# Patient Record
Sex: FEMALE | Race: BLACK OR AFRICAN AMERICAN | NOT HISPANIC OR LATINO | Employment: FULL TIME | URBAN - METROPOLITAN AREA
[De-identification: names, ages, dates, MRNs, and addresses within clinical notes are randomized per-mention and may not be internally consistent; named-entity substitution may affect disease eponyms.]

---

## 2022-07-27 ENCOUNTER — OFFICE VISIT (OUTPATIENT)
Dept: INTERNAL MEDICINE | Age: 42
End: 2022-07-27

## 2022-07-27 VITALS
WEIGHT: 256 LBS | TEMPERATURE: 97.3 F | DIASTOLIC BLOOD PRESSURE: 72 MMHG | SYSTOLIC BLOOD PRESSURE: 138 MMHG | BODY MASS INDEX: 43.71 KG/M2 | HEART RATE: 80 BPM | HEIGHT: 64 IN | OXYGEN SATURATION: 98 %

## 2022-07-27 DIAGNOSIS — M15.9 OSTEOARTHRITIS OF MULTIPLE JOINTS, UNSPECIFIED OSTEOARTHRITIS TYPE: ICD-10-CM

## 2022-07-27 DIAGNOSIS — E55.9 VITAMIN D DEFICIENCY: ICD-10-CM

## 2022-07-27 DIAGNOSIS — I10 BENIGN HYPERTENSION: Primary | ICD-10-CM

## 2022-07-27 DIAGNOSIS — Z01.419 GYNECOLOGIC EXAM NORMAL: ICD-10-CM

## 2022-07-27 DIAGNOSIS — Z11.59 ENCOUNTER FOR HEPATITIS C SCREENING TEST FOR LOW RISK PATIENT: ICD-10-CM

## 2022-07-27 DIAGNOSIS — Z00.00 ROUTINE HEALTH MAINTENANCE: ICD-10-CM

## 2022-07-27 DIAGNOSIS — E66.01 MORBID OBESITY WITH BODY MASS INDEX OF 50.0-59.9 IN ADULT: ICD-10-CM

## 2022-07-27 PROBLEM — G43.719 INTRACTABLE CHRONIC MIGRAINE WITHOUT AURA AND WITHOUT STATUS MIGRAINOSUS: Status: ACTIVE | Noted: 2022-07-27

## 2022-07-27 PROBLEM — M70.51 PES ANSERINUS BURSITIS OF RIGHT KNEE: Status: ACTIVE | Noted: 2021-08-13

## 2022-07-27 PROBLEM — M62.89 HAMSTRING TIGHTNESS OF RIGHT LOWER EXTREMITY: Status: ACTIVE | Noted: 2021-08-13

## 2022-07-27 PROBLEM — M25.561 CHRONIC PAIN OF RIGHT KNEE: Status: ACTIVE | Noted: 2021-08-13

## 2022-07-27 PROBLEM — K21.9 GASTROESOPHAGEAL REFLUX DISEASE: Status: ACTIVE | Noted: 2019-11-07

## 2022-07-27 PROBLEM — M22.41 CHONDROMALACIA OF RIGHT PATELLA: Status: ACTIVE | Noted: 2021-08-13

## 2022-07-27 PROBLEM — R76.8 HSV-2 SEROPOSITIVE: Status: ACTIVE | Noted: 2022-07-27

## 2022-07-27 PROBLEM — G89.29 CHRONIC PAIN OF RIGHT KNEE: Status: ACTIVE | Noted: 2021-08-13

## 2022-07-27 PROBLEM — Z98.84 GASTRIC BANDING STATUS: Status: ACTIVE | Noted: 2019-11-07

## 2022-07-27 PROCEDURE — 99204 OFFICE O/P NEW MOD 45 MIN: CPT | Performed by: NURSE PRACTITIONER

## 2022-07-27 RX ORDER — VALACYCLOVIR HYDROCHLORIDE 500 MG/1
500 TABLET, FILM COATED ORAL 2 TIMES DAILY
Qty: 60 TABLET | Refills: 0 | Status: SHIPPED | OUTPATIENT
Start: 2022-07-27 | End: 2022-11-15 | Stop reason: SDUPTHER

## 2022-07-27 RX ORDER — HYDROCHLOROTHIAZIDE 25 MG/1
25 TABLET ORAL DAILY
Qty: 90 TABLET | Refills: 0 | Status: SHIPPED | OUTPATIENT
Start: 2022-07-27 | End: 2022-12-06 | Stop reason: SDUPTHER

## 2022-07-27 RX ORDER — METOPROLOL TARTRATE 50 MG/1
50 TABLET, FILM COATED ORAL 2 TIMES DAILY
Qty: 180 TABLET | Refills: 0 | Status: SHIPPED | OUTPATIENT
Start: 2022-07-27 | End: 2022-12-06 | Stop reason: SDUPTHER

## 2022-07-27 NOTE — PROGRESS NOTES
I N T E R N A L  M E D I C I N E  CHRIS Lisa    ENCOUNTER DATE:  07/27/2022    Brittany Young / 42 y.o. / female      CHIEF COMPLAINT / REASON FOR OFFICE VISIT     Establish Care with new PCP, former patient of CHRIS Paez, follow up Hypertension, Osteoarthritis      ASSESSMENT & PLAN     Diagnoses and all orders for this visit:    1. Benign hypertension (Primary)  -     hydroCHLOROthiazide (HYDRODIURIL) 25 MG tablet; Take 1 tablet by mouth daily.  Dispense: 90 tablet; Refill: 0  -     metoprolol tartrate (LOPRESSOR) 50 MG tablet; Take 1 tablet by mouth 2 (Two) Times a Day for 90 days.  Dispense: 180 tablet; Refill: 0    2. Osteoarthritis of multiple joints, unspecified osteoarthritis type  -     Ambulatory Referral to Orthopedic Surgery  -States was receiving Cortisone injections through [facility]- request to resume     3. Morbid obesity with body mass index of 50.0-59.9 in adult (HCC)         - Discussion of diet, exercise, weight management    4. Vitamin D deficiency  -     Vitamin D 25 Hydroxy    5. Routine health maintenance  -     CBC & Differential  -     Comprehensive Metabolic Panel  -     Hemoglobin A1c  -     Lipid Panel With / Chol / HDL Ratio  -     T4, Free  -     TSH  -     Urinalysis With Microscopic If Indicated (No Culture) - Urine, Clean Catch  -     Vitamin D 25 Hydroxy    6. Encounter for hepatitis C screening test for low risk patient    7. Gynecologic exam normal  -     Ambulatory Referral to Obstetrics / Gynecology    Other orders  -     valACYclovir (VALTREX) 500 MG tablet; Take 1 tablet by mouth 2 (Two) Times a Day for 30 days.  Dispense: 60 tablet; Refill: 0    SUMMARY/DISCUSSION  • New patient here to establish with PCP- [facility]  • Discussion of COPD- states no current medications at this time. States did see Pulmonologist in the past   • Hx HSV and on chronic Valacyclovir  • Referral to OB/GYN, Orthopedic to establish care with new  "insurance  • Followed by Bariatric MD, Dr Colon  • Followed by Rheumatology for Rheumatoid Arthritis  • I spent 30 min in direct care of this patient on this date of service. This time includes times spent by me in the following activities: Preparing for the visit, obtaining and/or reviewing a separately obtained history, performing a medically appropriate examination and/or evaluation, reviewing medical records, reviewing tests, ordering medications, tests, or procedures, counseling and educating the patient, documenting information in the medical record and reviewing office note/correspondence from other providers.     Return in about 6 months (around 1/27/2023) for Next scheduled follow up.      VITAL SIGNS     Visit Vitals  /72   Pulse 80   Temp 97.3 °F (36.3 °C) (Temporal)   Ht 162.6 cm (64\")   Wt 116 kg (256 lb)   LMP 07/21/2022   SpO2 98%   BMI 43.94 kg/m²           BP Readings from Last 3 Encounters:   07/27/22 138/72   12/28/21 123/87     Wt Readings from Last 3 Encounters:   07/27/22 116 kg (256 lb)     Body mass index is 43.94 kg/m².    Blood pressure readings recorded on patient flowsheet:  No flowsheet data found.          MEDICATIONS AT THE TIME OF OFFICE VISIT     Current Outpatient Medications on File Prior to Visit   Medication Sig Dispense Refill   • ibuprofen (ADVIL,MOTRIN) 800 MG tablet Take 1 tablet by mouth Every 8 (Eight) Hours.     • multivitamin with minerals tablet tablet Daily.     • [DISCONTINUED] hydroCHLOROthiazide (HYDRODIURIL) 25 MG tablet Take 1 tablet by mouth daily. 90 tablet 0   • [DISCONTINUED] metoprolol tartrate (LOPRESSOR) 50 MG tablet      • [DISCONTINUED] valACYclovir (VALTREX) 500 MG tablet      • [DISCONTINUED] hydroCHLOROthiazide (HYDRODIURIL) 25 MG tablet      • [DISCONTINUED] metoprolol tartrate (LOPRESSOR) 50 MG tablet Take 1 tablet by mouth 2 (two) times daily. 180 tablet 0     No current facility-administered medications on file prior to visit.        HISTORY " OF PRESENT ILLNESS     42 year old female being seen today to establish care with new PCP, former patient of CHRIS Paez.   Hypertension: controlled. States takes both of her Metoprolol at the same time. Risk factors for coronary artery disease include Obesity, Tobacco abuse. There are no compliance problems.     Osteoarthritis/Rheumatoid Arthritis: taking Ibuprofen as needed. States due for cortisone injections- referral to Ortho sent. Discussion of weight control and effects on arthritis- verbalized understanding.     Patient Care Team:  Fide Tiwari APRN as PCP - General (Family Medicine)    REVIEW OF SYSTEMS     Review of Systems   Constitutional: Negative.    HENT: Negative.    Eyes: Negative.    Respiratory: Negative.    Cardiovascular: Negative.    Gastrointestinal: Negative.    Musculoskeletal: Positive for arthralgias and myalgias.   Skin: Negative.    Neurological: Negative.    Psychiatric/Behavioral: Negative.           PHYSICAL EXAMINATION     Physical Exam  Constitutional:       Appearance: She is obese.   HENT:      Head: Normocephalic.      Nose: Nose normal.      Mouth/Throat:      Mouth: Mucous membranes are moist.      Pharynx: Oropharynx is clear.   Cardiovascular:      Rate and Rhythm: Normal rate and regular rhythm.      Pulses: Normal pulses.      Heart sounds: Normal heart sounds.   Pulmonary:      Effort: Pulmonary effort is normal.      Breath sounds: Normal breath sounds.   Abdominal:      General: Bowel sounds are normal.      Palpations: Abdomen is soft.   Musculoskeletal:         General: Normal range of motion.      Cervical back: Normal range of motion and neck supple.   Skin:     General: Skin is warm and dry.   Neurological:      Mental Status: She is alert and oriented to person, place, and time.   Psychiatric:         Mood and Affect: Mood normal.             REVIEWED DATA     Labs:     Lab Results   Component Value Date     (L) 08/13/2021    K 3.3 (L) 08/13/2021     CALCIUM 9.3 08/13/2021     (H) 08/13/2021    ALT 70 (H) 08/13/2021    BUN 10 08/13/2021    CREATININE 0.8 08/13/2021    CREATININE 0.8 08/24/2020    CREATININE 0.9 04/10/2020       Lab Results   Component Value Date    HGBA1C 5.2 08/13/2021    HGBA1C 5.5 08/24/2020    HGBA1C 5.6 04/10/2020       Lab Results   Component Value Date    LDL 78 08/24/2020    LDL 92 04/10/2020    LDL 75 08/29/2019    HDL 46 (L) 08/24/2020    HDL 48 (L) 04/10/2020    TRIG 113 08/24/2020    TRIG 57 04/10/2020       Lab Results   Component Value Date    TSH 1.180 08/13/2021    TSH 1.300 08/24/2020    TSH 1.050 04/10/2020       Lab Results   Component Value Date    WBC 9.73 08/13/2021    HGB 14.2 08/13/2021     08/13/2021       No results found for: MALBCRERATIO       Imaging:           Medical Tests:           Summary of old records / correspondence / consultant report:           Request outside records:           CHRIS Lisa

## 2022-07-28 LAB
25(OH)D3+25(OH)D2 SERPL-MCNC: 21.7 NG/ML (ref 30–100)
ALBUMIN SERPL-MCNC: 4.1 G/DL (ref 3.8–4.8)
ALBUMIN/GLOB SERPL: 1.5 {RATIO} (ref 1.2–2.2)
ALP SERPL-CCNC: 53 IU/L (ref 44–121)
ALT SERPL-CCNC: 9 IU/L (ref 0–32)
APPEARANCE UR: ABNORMAL
AST SERPL-CCNC: 16 IU/L (ref 0–40)
BASOPHILS # BLD AUTO: 0 X10E3/UL (ref 0–0.2)
BASOPHILS NFR BLD AUTO: 0 %
BILIRUB SERPL-MCNC: 0.6 MG/DL (ref 0–1.2)
BILIRUB UR QL STRIP: NEGATIVE
BUN SERPL-MCNC: 13 MG/DL (ref 6–24)
BUN/CREAT SERPL: 14 (ref 9–23)
CALCIUM SERPL-MCNC: 9.7 MG/DL (ref 8.7–10.2)
CHLORIDE SERPL-SCNC: 103 MMOL/L (ref 96–106)
CHOLEST SERPL-MCNC: 153 MG/DL (ref 100–199)
CHOLEST/HDLC SERPL: 2.9 RATIO (ref 0–4.4)
CO2 SERPL-SCNC: 23 MMOL/L (ref 20–29)
COLOR UR: YELLOW
CREAT SERPL-MCNC: 0.92 MG/DL (ref 0.57–1)
EGFRCR SERPLBLD CKD-EPI 2021: 81 ML/MIN/1.73
EOSINOPHIL # BLD AUTO: 0.1 X10E3/UL (ref 0–0.4)
EOSINOPHIL NFR BLD AUTO: 1 %
ERYTHROCYTE [DISTWIDTH] IN BLOOD BY AUTOMATED COUNT: 11.7 % (ref 11.7–15.4)
GLOBULIN SER CALC-MCNC: 2.8 G/DL (ref 1.5–4.5)
GLUCOSE SERPL-MCNC: 98 MG/DL (ref 65–99)
GLUCOSE UR QL STRIP: NEGATIVE
HBA1C MFR BLD: 5.1 % (ref 4.8–5.6)
HCT VFR BLD AUTO: 38 % (ref 34–46.6)
HDLC SERPL-MCNC: 53 MG/DL
HGB BLD-MCNC: 13.2 G/DL (ref 11.1–15.9)
HGB UR QL STRIP: NEGATIVE
IMM GRANULOCYTES # BLD AUTO: 0 X10E3/UL (ref 0–0.1)
IMM GRANULOCYTES NFR BLD AUTO: 0 %
KETONES UR QL STRIP: ABNORMAL
LDLC SERPL CALC-MCNC: 87 MG/DL (ref 0–99)
LEUKOCYTE ESTERASE UR QL STRIP: NEGATIVE
LYMPHOCYTES # BLD AUTO: 2.4 X10E3/UL (ref 0.7–3.1)
LYMPHOCYTES NFR BLD AUTO: 31 %
MCH RBC QN AUTO: 35.6 PG (ref 26.6–33)
MCHC RBC AUTO-ENTMCNC: 34.7 G/DL (ref 31.5–35.7)
MCV RBC AUTO: 102 FL (ref 79–97)
MICRO URNS: ABNORMAL
MONOCYTES # BLD AUTO: 0.4 X10E3/UL (ref 0.1–0.9)
MONOCYTES NFR BLD AUTO: 6 %
NEUTROPHILS # BLD AUTO: 4.7 X10E3/UL (ref 1.4–7)
NEUTROPHILS NFR BLD AUTO: 62 %
NITRITE UR QL STRIP: NEGATIVE
PH UR STRIP: 6 [PH] (ref 5–7.5)
PLATELET # BLD AUTO: 309 X10E3/UL (ref 150–450)
POTASSIUM SERPL-SCNC: 3.9 MMOL/L (ref 3.5–5.2)
PROT SERPL-MCNC: 6.9 G/DL (ref 6–8.5)
PROT UR QL STRIP: ABNORMAL
RBC # BLD AUTO: 3.71 X10E6/UL (ref 3.77–5.28)
SODIUM SERPL-SCNC: 140 MMOL/L (ref 134–144)
SP GR UR STRIP: >=1.03 (ref 1–1.03)
T4 FREE SERPL-MCNC: 1.27 NG/DL (ref 0.82–1.77)
TRIGL SERPL-MCNC: 63 MG/DL (ref 0–149)
TSH SERPL DL<=0.005 MIU/L-ACNC: 1.04 UIU/ML (ref 0.45–4.5)
UROBILINOGEN UR STRIP-MCNC: 1 MG/DL (ref 0.2–1)
VLDLC SERPL CALC-MCNC: 13 MG/DL (ref 5–40)
WBC # BLD AUTO: 7.7 X10E3/UL (ref 3.4–10.8)

## 2022-09-01 ENCOUNTER — OFFICE VISIT (OUTPATIENT)
Dept: INTERNAL MEDICINE | Age: 42
End: 2022-09-01

## 2022-09-01 VITALS
OXYGEN SATURATION: 99 % | TEMPERATURE: 98.2 F | BODY MASS INDEX: 43.94 KG/M2 | HEIGHT: 64 IN | HEART RATE: 74 BPM | DIASTOLIC BLOOD PRESSURE: 72 MMHG | SYSTOLIC BLOOD PRESSURE: 118 MMHG

## 2022-09-01 DIAGNOSIS — J01.11 ACUTE RECURRENT FRONTAL SINUSITIS: Primary | ICD-10-CM

## 2022-09-01 PROCEDURE — 99213 OFFICE O/P EST LOW 20 MIN: CPT | Performed by: NURSE PRACTITIONER

## 2022-09-01 RX ORDER — ALBUTEROL SULFATE 90 UG/1
2 AEROSOL, METERED RESPIRATORY (INHALATION) EVERY 4 HOURS PRN
Qty: 18 G | Refills: 0 | Status: SHIPPED | OUTPATIENT
Start: 2022-09-01 | End: 2022-09-17

## 2022-09-01 RX ORDER — AZITHROMYCIN 500 MG/1
500 TABLET, FILM COATED ORAL DAILY
Qty: 10 TABLET | Refills: 0 | Status: SHIPPED | OUTPATIENT
Start: 2022-09-01 | End: 2022-09-11

## 2022-09-01 NOTE — PROGRESS NOTES
"    I N T E R N A L  M E D I C I N E  Fide Tiwari, APRN    ENCOUNTER DATE:  09/01/2022    Brittany Young / 42 y.o. / female      CHIEF COMPLAINT / REASON FOR OFFICE VISIT     Sore Throat (Pt states she tested pos for COVID a week ago,, 3 days later after testing pos, pt test was neg, pt c/o sore throat, nasal drainage, productive cough, wheezing, no fever, headaches, chest soreness x2wks )      ASSESSMENT & PLAN     Diagnoses and all orders for this visit:    1. Acute recurrent frontal sinusitis (Primary)    Other orders  -     azithromycin (Zithromax) 500 MG tablet; Take 1 tablet by mouth Daily for 10 days.  Dispense: 10 tablet; Refill: 0  -     albuterol sulfate  (90 Base) MCG/ACT inhaler; Inhale 2 puffs Every 4 (Four) Hours As Needed for Wheezing for up to 7 days.  Dispense: 18 g; Refill: 0         SUMMARY/DISCUSSION  • Discussion of Acute Sinusitis and Treatment  • I spent 15 min in direct care of this patient on this date of service. This time includes times spent by me in the following activities: Preparing for the visit, obtaining and/or reviewing a separately obtained history, performing a medically appropriate examination and/or evaluation, reviewing medical records, reviewing tests, ordering medications, tests, or procedures, counseling and educating the patient, documenting information in the medical record and reviewing office note/correspondence from other providers.     Next Appointment with me: 1/25/2023      VITAL SIGNS     Visit Vitals  /72   Pulse 74   Temp 98.2 °F (36.8 °C)   Ht 162.6 cm (64\")   SpO2 99%   Breastfeeding No   BMI 43.94 kg/m²           BP Readings from Last 3 Encounters:   09/01/22 118/72   07/27/22 138/72   12/28/21 123/87     Wt Readings from Last 3 Encounters:   07/27/22 116 kg (256 lb)     Body mass index is 43.94 kg/m².    Blood pressure readings recorded on patient flowsheet:  No flowsheet data found.          MEDICATIONS AT THE TIME OF OFFICE VISIT     Current " Outpatient Medications on File Prior to Visit   Medication Sig Dispense Refill   • hydroCHLOROthiazide (HYDRODIURIL) 25 MG tablet Take 1 tablet by mouth daily. 90 tablet 0   • ibuprofen (ADVIL,MOTRIN) 800 MG tablet Take 1 tablet by mouth Every 8 (Eight) Hours.     • metoprolol tartrate (LOPRESSOR) 50 MG tablet Take 1 tablet by mouth 2 (Two) Times a Day for 90 days. 180 tablet 0   • multivitamin with minerals tablet tablet Daily.       No current facility-administered medications on file prior to visit.        HISTORY OF PRESENT ILLNESS      42 year old female being seen today for acute cough, congestion, nasal drainage, sore throat. Patient tested positive for COVID-19 over 2 weeks ago, then recently tested negative. Remains with cough, congestion, throat drainage, sinus pressure and pain, and left ear pain. Patient with acute sinusitis- escribed Azithromycin to pharmacy and Albuterol for wheezes. Blood pressure stable with current medication regime.     Patient Care Team:  Fide Tiwari APRN as PCP - General (Family Medicine)    REVIEW OF SYSTEMS     Review of Systems   Constitutional: Negative for appetite change, fatigue and fever.   HENT: Positive for ear pain, rhinorrhea, sinus pressure, sinus pain and sore throat. Negative for congestion and tinnitus.    Eyes: Negative.    Respiratory: Positive for wheezing. Negative for cough and chest tightness.    Cardiovascular: Negative.    Neurological: Positive for headaches. Negative for dizziness.          PHYSICAL EXAMINATION     Physical Exam  HENT:      Ears:      Comments: Left ear with serous drainage and tenderness, erythema     Nose: Nose normal.      Mouth/Throat:      Pharynx: Oropharyngeal exudate and posterior oropharyngeal erythema present.   Cardiovascular:      Rate and Rhythm: Normal rate and regular rhythm.      Pulses: Normal pulses.      Heart sounds: Normal heart sounds.   Pulmonary:      Breath sounds: Wheezing present.   Lymphadenopathy:       Cervical: No cervical adenopathy.   Skin:     General: Skin is warm and dry.   Neurological:      Mental Status: She is alert. Mental status is at baseline.             REVIEWED DATA     Labs:     Lab Results   Component Value Date     07/27/2022    K 3.9 07/27/2022    CALCIUM 9.7 07/27/2022    AST 16 07/27/2022    ALT 9 07/27/2022    BUN 13 07/27/2022    CREATININE 0.92 07/27/2022    CREATININE 0.8 08/13/2021    CREATININE 0.8 08/24/2020       Lab Results   Component Value Date    HGBA1C 5.1 07/27/2022    HGBA1C 5.2 08/13/2021    HGBA1C 5.5 08/24/2020       Lab Results   Component Value Date    LDL 87 07/27/2022    LDL 78 08/24/2020    LDL 92 04/10/2020    HDL 53 07/27/2022    HDL 46 (L) 08/24/2020    TRIG 63 07/27/2022    TRIG 113 08/24/2020       Lab Results   Component Value Date    TSH 1.040 07/27/2022    TSH 1.180 08/13/2021    TSH 1.300 08/24/2020    FREET4 1.27 07/27/2022       Lab Results   Component Value Date    WBC 7.7 07/27/2022    HGB 13.2 07/27/2022     07/27/2022       No results found for: MALBCRERATIO       Imaging:           Medical Tests:           Summary of old records / correspondence / consultant report:           Request outside records:           CHRIS Lisa

## 2022-09-06 ENCOUNTER — TELEPHONE (OUTPATIENT)
Dept: INTERNAL MEDICINE | Age: 42
End: 2022-09-06

## 2022-09-06 NOTE — TELEPHONE ENCOUNTER
Caller: Mansoor Harriskp    Relationship: Self    Best call back number:     Who are you requesting to speak with (clinical staff, provider,  specific staff member): CLINICAL    What was the call regarding: PATIENT STATES SHE WAS SEEN ON 9/1 AND TESTED FOR COVID.  PATIENT IS NOT SEEING THOSE RESULTS ON [facility], AND NEEDS TO HAVE THEM IN ORDER TO RETURN TO WORK TOMORROW.  PLEASE CONTACT THE PATIENT AS SOON AS POSSIBLE TODAY TO PROVIDE COVID RESULTS.      Do you require a callback: YES  
Pt informed no covid test was done In office due to being positive 2 weeks prior.   
No

## 2022-09-14 ENCOUNTER — OFFICE VISIT (OUTPATIENT)
Dept: OBSTETRICS AND GYNECOLOGY | Facility: CLINIC | Age: 42
End: 2022-09-14

## 2022-09-14 ENCOUNTER — OFFICE VISIT (OUTPATIENT)
Dept: ORTHOPEDIC SURGERY | Facility: CLINIC | Age: 42
End: 2022-09-14

## 2022-09-14 ENCOUNTER — PATIENT ROUNDING (BHMG ONLY) (OUTPATIENT)
Dept: ORTHOPEDIC SURGERY | Facility: CLINIC | Age: 42
End: 2022-09-14

## 2022-09-14 VITALS — WEIGHT: 256 LBS | TEMPERATURE: 97.1 F | BODY MASS INDEX: 43.71 KG/M2 | HEIGHT: 64 IN

## 2022-09-14 VITALS
BODY MASS INDEX: 43.71 KG/M2 | WEIGHT: 256 LBS | SYSTOLIC BLOOD PRESSURE: 127 MMHG | HEIGHT: 64 IN | DIASTOLIC BLOOD PRESSURE: 88 MMHG

## 2022-09-14 DIAGNOSIS — Z11.3 SCREENING FOR STD (SEXUALLY TRANSMITTED DISEASE): ICD-10-CM

## 2022-09-14 DIAGNOSIS — R52 PAIN: Primary | ICD-10-CM

## 2022-09-14 DIAGNOSIS — N93.9 ABNORMAL UTERINE BLEEDING (AUB): ICD-10-CM

## 2022-09-14 DIAGNOSIS — Z01.419 WOMEN'S ANNUAL ROUTINE GYNECOLOGICAL EXAMINATION: Primary | ICD-10-CM

## 2022-09-14 DIAGNOSIS — M17.0 ARTHRITIS OF BOTH KNEES: ICD-10-CM

## 2022-09-14 DIAGNOSIS — E66.01 OBESITY, MORBID, BMI 40.0-49.9: ICD-10-CM

## 2022-09-14 DIAGNOSIS — B97.7 HPV IN FEMALE: ICD-10-CM

## 2022-09-14 LAB — TSH SERPL DL<=0.005 MIU/L-ACNC: 1.23 UIU/ML (ref 0.27–4.2)

## 2022-09-14 PROCEDURE — 73562 X-RAY EXAM OF KNEE 3: CPT | Performed by: ORTHOPAEDIC SURGERY

## 2022-09-14 PROCEDURE — 99204 OFFICE O/P NEW MOD 45 MIN: CPT | Performed by: ORTHOPAEDIC SURGERY

## 2022-09-14 PROCEDURE — 99396 PREV VISIT EST AGE 40-64: CPT | Performed by: NURSE PRACTITIONER

## 2022-09-14 NOTE — PROGRESS NOTES
Patient Name: Brittany Sheppard   YOB: 1980  Referring Primary Care Physician: Fide Tiwari APRN  BMI: Body mass index is 43.94 kg/m².    Chief Complaint:    Chief Complaint   Patient presents with   • Right Knee - Follow-up   • Left Knee - Follow-up        HPI:     Brittany Sheppard is a 42 y.o. female who presents today for evaluation of   Chief Complaint   Patient presents with   • Right Knee - Follow-up   • Left Knee - Follow-up   .  Patient is seen today complaint of bilateral knee pain.  Is been going on for quite some time.  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  She was seen by Dr. Soriano in the past and Dr. Ragland and has had injections in the past as well.  Reviewing her chart it says she has had a gastric band and she says she is seeing rheumatologist is told her she has elements of rheumatoid rheumatologic conditions.  She said the knees are major problem today they are bothering him and hurt when she goes up and down stairs she says she has had a lot of weight loss      Subjective   Medications:   Home Medications:  Current Outpatient Medications on File Prior to Visit   Medication Sig   • albuterol sulfate  (90 Base) MCG/ACT inhaler Inhale 2 puffs Every 4 (Four) Hours As Needed for Wheezing for up to 7 days.   • hydroCHLOROthiazide (HYDRODIURIL) 25 MG tablet Take 1 tablet by mouth daily.   • ibuprofen (ADVIL,MOTRIN) 800 MG tablet Take 1 tablet by mouth Every 8 (Eight) Hours.   • metoprolol tartrate (LOPRESSOR) 50 MG tablet Take 1 tablet by mouth 2 (Two) Times a Day for 90 days.   • multivitamin with minerals tablet tablet Daily.   • valACYclovir (VALTREX) 500 MG tablet Take 1 tablet by mouth 2 (Two) Times a Day for 30 days.     No current facility-administered medications on file prior to visit.     Current Medications:  Scheduled Meds:  Continuous Infusions:No current facility-administered  medications for this visit.    PRN Meds:.    I have reviewed the patient's medical history in detail and updated the computerized patient record.  Review and summarization of old records includes:    Past Medical History:   Diagnosis Date   • Anemia    • Anxiety    • Arthritis    • Asthma    • Back pain    • Bipolar 1 disorder (HCC)    • COPD (chronic obstructive pulmonary disease) (HCC)    • Depression    • Dyspnea    • Elevated liver enzymes    • GERD (gastroesophageal reflux disease)    • H. pylori infection     treated 8/2016   • Hepatitis C 03/2015    harvoni completed; attributed to tatoo   • Hirsutism    • History of migraine headaches     with scotomata   • HPV in female 04/23/2021   • HPV in female 03/2014   • HPV in female 10/2015   • Hypertension    • IBS (irritable bowel syndrome)    • Insomnia    • LGSIL on Pap smear of cervix 01/2016    cx biopsy confoirmed LGSIL   • Obesity    • PCOS (polycystic ovarian syndrome)    • PCR positive for herpes simplex virus type 1 (HSV-1) DNA 2018   • Sleep apnea    • Steatosis of liver     liver biopsy; moderate; stage 3, grade 0   • Vitamin D deficiency         Past Surgical History:   Procedure Laterality Date   • APPENDECTOMY  07/19/2010    [facility]   • BREAST SURGERY      I & D of abcess X3   • D & C HYSTEROSCOPY ENDOMETRIAL ABLATION  12/16/2010    menorrhagia; pathology benign   • LAPAROSCOPIC GASTRIC BANDING  03/27/2017    Dr. Prieto Colon [facility]   • LIVER BIOPSY     • PILONIDAL CYST / SINUS EXCISION  09/2008   • POLYPECTOMY     • TOOTH EXTRACTION  2016   • WISDOM TOOTH EXTRACTION  2012        Social History     Occupational History   • Not on file   Tobacco Use   • Smoking status: Current Every Day Smoker   • Smokeless tobacco: Never Used   Substance and Sexual Activity   • Alcohol use: Yes     Comment: occassionally   • Drug use: Not on file   • Sexual activity: Yes     Partners: Male, Female      Social History     Social History Narrative   • Not on file       "  Family History   Problem Relation Age of Onset   • Diabetes Father    • Hypertension Father    • Migraines Father    • Alcohol abuse Father    • Hypertension Mother    • Obesity Mother    • Stroke Paternal Grandmother    • Breast cancer Paternal Grandmother    • Hypertension Paternal Grandmother    • Pancreatic cancer Paternal Grandmother    • Stroke Maternal Grandmother    • Breast cancer Maternal Grandmother 72   • Diabetes Maternal Grandmother    • Hypertension Maternal Grandmother    • Stroke Maternal Grandfather    • Cancer Maternal Grandfather        ROS: 14 point review of systems was performed and all other systems were reviewed and are negative except for documented findings in HPI and today's encounter.     Allergies:   Allergies   Allergen Reactions   • Cephalexin Hives   • Penicillins Anaphylaxis and Hives   • Prunus Persica Anaphylaxis   • Cefaclor Hives   • Codeine Hives   • Morphine Hives and Itching   • Latex Hives and Rash     Hives and anaphlaxic       Constitutional:  Denies fever, shaking or chills   Eyes:  Denies change in visual acuity   HENT:  Denies nasal congestion or sore throat   Respiratory:  Denies cough or shortness of breath   Cardiovascular:  Denies chest pain or severe LE edema   GI:  Denies abdominal pain, nausea, vomiting, bloody stools or diarrhea   Musculoskeletal:  Numbness, tingling, pain, or loss of motor function only as noted above in history of present illness.  : Denies painful urination or hematuria  Integument:  Denies rash, lesion or ulceration   Neurologic:  Denies headache or focal weakness  Endocrine:  Denies lymphadenopathy  Psych:  Denies confusion or change in mental status   Hem:  Denies active bleeding    OBJECTIVE:  Physical Exam: 42 y.o. female  Wt Readings from Last 3 Encounters:   09/14/22 116 kg (256 lb)   09/14/22 116 kg (256 lb)   07/27/22 116 kg (256 lb)     Ht Readings from Last 1 Encounters:   09/14/22 162.6 cm (64\")     Body mass index is 43.94 " kg/m².  Vitals:    09/14/22 1320   Temp: 97.1 °F (36.2 °C)     Vital signs reviewed.     General Appearance:    Alert, cooperative, in no acute distress                  Eyes: conjunctiva clear  ENT: external ears and nose atraumatic  CV: no peripheral edema  Resp: normal respiratory effort  Skin: no rashes or wounds; normal turgor  Psych: mood and affect appropriate  Lymph: no nodes appreciated  Neuro: gross sensation intact  Vascular:  Palpable peripheral pulse in noted extremity  Musculoskeletal Extremities: Exam shows crepitation synovitis swelling and joint line tenderness with some pseudolaxity she has patellofemoral crepitation Bill's is negative    Radiology:   AP lateral 40 degree PA x-ray bilateral knees taken the office today for complaints of pain without comparison views available show advanced arthritic change with narrowing especially patellofemoral joint tricompartmental osteophytes and varus pattern        Assessment:     ICD-10-CM ICD-9-CM   1. Pain  R52 780.96   2. Arthritis of both knees  M17.0 716.96   3. Obesity, morbid, BMI 40.0-49.9 ([facility])  E66.01 278.01        MDM/Plan:   The diagnosis(es), natural history, pathophysiology and treatment for diagnosis(es) were discussed. Opportunity given and questions answered.  Biomechanics of pertinent body areas discussed.  When appropriate, the use of ambulatory aids discussed.    BMI:  The concept of BMI body mass index and its importance and implications discussed.    EXERCISES:  Advice on benefits of, and types of regular/moderate exercise pertaining to orthopedic diagnosis(es).  Inflammation/pain control; with cold, heat, elevation and/or liniments discussed as appropriate  Cortisone Injection. See procedure note.  MEDICAL RECORDS reviewed from other provider(s) for past and current medical history pertinent to this complaint.  Consider possibility of physical therapy as needed.  She says she is not in any entire rheumatologic drugs at the current  time but does follow with a rheumatologist.  Injected her knees and can see her back as necessary    9/14/2022    Dictated utilizing Dragon dictation

## 2022-09-14 NOTE — PROGRESS NOTES
GYN Annual Exam     Chief Complaint   Patient presents with   • Gynecologic Exam     New patient annual exam        HPI    Brittany Sheppard is a 42 y.o. female who presents for annual well woman exam.  She is sexually active with men and women. She is currently in a monogamous relationship with a male partner, her . Periods are irregular, lasting 5 days. Dysmenorrhea:none. Cyclic symptoms include none. No intermenstrual bleeding, spotting, or discharge. Performing SBE:occas. Hx AUB, reports often having 2 menses per month. She has addressed this in the past with other gyn, denies prior work up.     Hx HSV, denies recent outbreaks. Has Rx for valtrex at home    This is my first time meeting Brittany Sheppard  Prior care with Dr Hodges.   OB History        0    Para   0    Term   0       0    AB   0    Living   0       SAB   0    IAB   0    Ectopic   0    Molar   0    Multiple   0    Live Births   0                LMP- 24  Current contraception: none  Last Pap- 2021 NIL, +HPV  History of abnormal Pap smear: Hx LSIL, HPV positive, +high risk HPV. Prior colposcopy   History of STD-HPV, HSV  Family history of uterine, colon or ovarian cancer: no  Family history of breast cancer: yes - MGM and PGM  Mammogram- never    Past Medical History:   Diagnosis Date   • Anemia    • Anxiety    • Arthritis    • Asthma    • Back pain    • Bipolar 1 disorder (HCC)    • COPD (chronic obstructive pulmonary disease) (HCC)    • Depression    • Dyspnea    • Elevated liver enzymes    • GERD (gastroesophageal reflux disease)    • H. pylori infection     treated 2016   • Hepatitis C 2015    harvoni completed; attributed to tatoo   • Hirsutism    • History of migraine headaches     with scotomata   • HPV in female 2021   • HPV in female 2014   • HPV in female 10/2015   • Hypertension    • IBS (irritable bowel syndrome)    • Insomnia    • LGSIL on Pap smear of cervix 2016    cx biopsy confoirmed LGSIL   •  Obesity    • PCOS (polycystic ovarian syndrome)    • PCR positive for herpes simplex virus type 1 (HSV-1) DNA 2018   • Sleep apnea    • Steatosis of liver     liver biopsy; moderate; stage 3, grade 0   • Vitamin D deficiency        Past Surgical History:   Procedure Laterality Date   • APPENDECTOMY  07/19/2010    [facility]   • BREAST SURGERY      I & D of abcess X3   • D & C HYSTEROSCOPY ENDOMETRIAL ABLATION  12/16/2010    menorrhagia; pathology benign   • LAPAROSCOPIC GASTRIC BANDING  03/27/2017    Dr. Prieto [facility]   • LIVER BIOPSY     • PILONIDAL CYST / SINUS EXCISION  09/2008   • POLYPECTOMY     • TOOTH EXTRACTION  2016   • WISDOM TOOTH EXTRACTION  2012         Current Outpatient Medications:   •  albuterol sulfate  (90 Base) MCG/ACT inhaler, Inhale 2 puffs Every 4 (Four) Hours As Needed for Wheezing for up to 7 days., Disp: 18 g, Rfl: 0  •  hydroCHLOROthiazide (HYDRODIURIL) 25 MG tablet, Take 1 tablet by mouth daily., Disp: 90 tablet, Rfl: 0  •  ibuprofen (ADVIL,MOTRIN) 800 MG tablet, Take 1 tablet by mouth Every 8 (Eight) Hours., Disp: , Rfl:   •  metoprolol tartrate (LOPRESSOR) 50 MG tablet, Take 1 tablet by mouth 2 (Two) Times a Day for 90 days., Disp: 180 tablet, Rfl: 0  •  multivitamin with minerals tablet tablet, Daily., Disp: , Rfl:   •  valACYclovir (VALTREX) 500 MG tablet, Take 1 tablet by mouth 2 (Two) Times a Day for 30 days., Disp: 60 tablet, Rfl: 0    Allergies   Allergen Reactions   • Cephalexin Hives   • Penicillins Anaphylaxis and Hives   • Prunus Persica Anaphylaxis   • Cefaclor Hives   • Codeine Hives   • Morphine Hives and Itching   • Latex Hives and Rash     Hives and anaphlaxic         Social History     Tobacco Use   • Smoking status: Current Every Day Smoker   • Smokeless tobacco: Never Used   Substance Use Topics   • Alcohol use: Yes     Comment: occassionally       Family History   Problem Relation Age of Onset   • Diabetes Father    • Hypertension Father    • Migraines Father   "  • Alcohol abuse Father    • Hypertension Mother    • Obesity Mother    • Stroke Paternal Grandmother    • Breast cancer Paternal Grandmother    • Hypertension Paternal Grandmother    • Pancreatic cancer Paternal Grandmother    • Stroke Maternal Grandmother    • Breast cancer Maternal Grandmother 72   • Diabetes Maternal Grandmother    • Hypertension Maternal Grandmother    • Stroke Maternal Grandfather    • Cancer Maternal Grandfather        Review of Systems   Constitutional: Negative for chills, fatigue and fever.   Gastrointestinal: Negative for abdominal distention, abdominal pain, nausea and vomiting.   Endocrine: Negative for cold intolerance and heat intolerance.   Genitourinary: Negative for breast discharge, breast lump, breast pain, dysuria, menstrual problem, pelvic pain, pelvic pressure, vaginal bleeding, vaginal discharge and vaginal pain.   Musculoskeletal: Negative for gait problem.   Skin: Negative for rash.   Neurological: Negative for dizziness and headache.   Psychiatric/Behavioral: Negative for behavioral problems.       /88   Ht 162.6 cm (64\")   Wt 116 kg (256 lb)   LMP 09/01/2022   BMI 43.94 kg/m²     Physical Exam  Constitutional:       General: She is not in acute distress.     Appearance: Normal appearance. She is obese. She is not ill-appearing, toxic-appearing or diaphoretic.   Genitourinary:      Vulva, bladder and urethral meatus normal.      No lesions in the vagina.      Right Labia: No rash, tenderness, lesions, skin changes or Bartholin's cyst.     Left Labia: No tenderness, lesions, skin changes, Bartholin's cyst or rash.     No labial fusion noted.      No inguinal adenopathy present in the right or left side.     No vaginal discharge, erythema, tenderness, bleeding or ulceration.      No vaginal prolapse present.     No vaginal atrophy present.       Right Adnexa: not tender, not full, not palpable, no mass present and not absent.     Left Adnexa: not tender, not full, " not palpable, no mass present and not absent.     Cervical friability present.      No cervical motion tenderness, discharge, lesion, polyp, nabothian cyst or eversion.      Uterus is not enlarged, fixed, tender, irregular or prolapsed.      No uterine mass detected.     No urethral tenderness or mass present.      Pelvic exam was performed with patient in the lithotomy position.   Breasts: Breasts are symmetrical.      Right: Present. No swelling, bleeding, inverted nipple, mass, nipple discharge, skin change, tenderness, breast implant, axillary adenopathy or supraclavicular adenopathy.      Left: Present. No swelling, bleeding, inverted nipple, mass, nipple discharge, skin change, tenderness, breast implant, axillary adenopathy or supraclavicular adenopathy.       HENT:      Head: Normocephalic and atraumatic.   Eyes:      Pupils: Pupils are equal, round, and reactive to light.   Cardiovascular:      Rate and Rhythm: Normal rate.   Pulmonary:      Effort: Pulmonary effort is normal.   Abdominal:      General: There is no distension.      Palpations: Abdomen is soft. There is no mass.      Tenderness: There is no abdominal tenderness. There is no guarding.      Hernia: No hernia is present. There is no hernia in the left inguinal area or right inguinal area.   Musculoskeletal:         General: Normal range of motion.      Cervical back: Normal range of motion and neck supple. No tenderness.   Lymphadenopathy:      Cervical: No cervical adenopathy.      Upper Body:      Right upper body: No supraclavicular, axillary or pectoral adenopathy.      Left upper body: No supraclavicular, axillary or pectoral adenopathy.      Lower Body: No right inguinal adenopathy. No left inguinal adenopathy.   Neurological:      General: No focal deficit present.      Mental Status: She is alert and oriented to person, place, and time.      Cranial Nerves: No cranial nerve deficit.   Skin:     General: Skin is warm and dry.    Psychiatric:         Mood and Affect: Mood normal.         Behavior: Behavior normal.         Thought Content: Thought content normal.         Judgment: Judgment normal.   Vitals and nursing note reviewed.         Assessment   Diagnoses and all orders for this visit:    1. Women's annual routine gynecological examination (Primary)  -     IGP, Apt HPV,rfx 16 / 18,45    2. Abnormal uterine bleeding (AUB)  -     DHEA-Sulfate  -     Hemoglobin A1c  -     Prolactin  -     Hemoglobin & Hematocrit, Blood  -     TSH    3. HPV in female  -     IGP, Apt HPV,rfx 16 / 18,45    4. Screening for STD (sexually transmitted disease)  -     Chlamydia trachomatis, Neisseria gonorrhoeae, Trichomonas vaginalis, PCR - Swab, Cervix  -     HIV-1 / O / 2 Ag / Antibody 4th Generation  -     RPR  -     Hepatitis B Surface Antigen       Plan   1. Well woman exam: Pap collected Yes. Recommend MVI daily.    2. Contraception: declines  3. STD: Enc condoms. Desires STD screen today- Yes. Serum and NuSwab  4. Smoking status: Current every day smoker  5.  Encouraged annual mammogram screening starting at age 40. Instructed on how to perform SBE. Encouraged breast health self awareness.  6.    Encouraged 150 minutes of exercise per week if not medially contraindicated.   7.    Morbid obesity by BMI 43.94  8.   Discussed work up for AUB, will collect labs today. Consider TVUS if this continues to be an issue    Follow Up one year or PRN    Grecia Khan, APRN  9/14/2022  11:26 EDT

## 2022-09-14 NOTE — PROGRESS NOTES
A [employer] Message has been sent to the patient for [place] [facility]

## 2022-09-15 LAB
DHEA-S SERPL-MCNC: 320 UG/DL (ref 57.3–279.2)
HBA1C MFR BLD: 5.3 % (ref 4.8–5.6)
HBV SURFACE AG SERPL QL IA: NEGATIVE
HCT VFR BLD AUTO: 38.5 % (ref 34–46.6)
HGB BLD-MCNC: 13.6 G/DL (ref 12–15.9)
HIV 1+2 AB+HIV1 P24 AG SERPL QL IA: NON REACTIVE
PROLACTIN SERPL-MCNC: 16 NG/ML (ref 4.8–23.3)
RPR SER QL: NORMAL

## 2022-09-16 ENCOUNTER — PATIENT ROUNDING (BHMG ONLY) (OUTPATIENT)
Dept: OBSTETRICS AND GYNECOLOGY | Facility: CLINIC | Age: 42
End: 2022-09-16

## 2022-09-16 ENCOUNTER — PATIENT MESSAGE (OUTPATIENT)
Dept: OBSTETRICS AND GYNECOLOGY | Facility: CLINIC | Age: 42
End: 2022-09-16

## 2022-09-16 LAB
C TRACH RRNA SPEC QL NAA+PROBE: NEGATIVE
N GONORRHOEA RRNA SPEC QL NAA+PROBE: NEGATIVE
T VAGINALIS RRNA SPEC QL NAA+PROBE: NEGATIVE

## 2022-09-16 NOTE — PROGRESS NOTES
My chart message has been sent to the patient for [place] [facility]

## 2022-09-21 LAB
CYTOLOGIST CVX/VAG CYTO: ABNORMAL
CYTOLOGY CVX/VAG DOC CYTO: ABNORMAL
CYTOLOGY CVX/VAG DOC THIN PREP: ABNORMAL
DX ICD CODE: ABNORMAL
DX ICD CODE: ABNORMAL
HIV 1 & 2 AB SER-IMP: ABNORMAL
HPV I/H RISK 4 DNA CVX QL PROBE+SIG AMP: POSITIVE
HPV16 DNA CVX QL PROBE+SIG AMP: NEGATIVE
HPV18+45 E6+E7 MRNA CVX QL NAA+PROBE: NEGATIVE
OTHER STN SPEC: ABNORMAL
PATH REPORT.COMMENTS IMP SPEC: ABNORMAL
PATHOLOGIST CVX/VAG CYTO: ABNORMAL
RECOM F/U CVX/VAG CYTO: ABNORMAL
STAT OF ADQ CVX/VAG CYTO-IMP: ABNORMAL

## 2022-09-23 ENCOUNTER — TELEPHONE (OUTPATIENT)
Dept: OBSTETRICS AND GYNECOLOGY | Facility: CLINIC | Age: 42
End: 2022-09-23

## 2022-09-23 NOTE — TELEPHONE ENCOUNTER
I called Brittany Sheppard to review lab results. Notified LSIL, atypical cells, unable to rule out HSIL. Positive HPV, negative high risk genotypes. Recommend she f/u in office for colposcopy.     Grecia Khan, CHRIS  9/23/22  3:05pm

## 2022-10-10 ENCOUNTER — TELEPHONE (OUTPATIENT)
Dept: OBSTETRICS AND GYNECOLOGY | Facility: CLINIC | Age: 42
End: 2022-10-10

## 2022-10-10 NOTE — TELEPHONE ENCOUNTER
Pt sent mychart msg c/o excessive bleeding & cramping x 10 days.  Scheduled for colpo next Wednesday, 10/19/22.  Do you need to see her prior to colpo?    Thanks,   Pepper

## 2022-10-10 NOTE — TELEPHONE ENCOUNTER
----- Message from Brittany Sheppard sent at 10/10/2022  8:55 AM EDT -----  Regarding: Menstrual Cycle  Contact: [phone]  Aimee, I know I have that colposcopy scheduled for the 19th but I have now been bleeding for 10 days straight and the cramps aren't easing. The bleeding isn't as heavy but the cramps are bad. Just trying to figure out what to do. I've been taking ibuprofen everyday but these cramps aren't letting up. Please help. Thanks

## 2022-10-19 ENCOUNTER — OFFICE VISIT (OUTPATIENT)
Dept: OBSTETRICS AND GYNECOLOGY | Facility: CLINIC | Age: 42
End: 2022-10-19

## 2022-10-19 VITALS
DIASTOLIC BLOOD PRESSURE: 87 MMHG | BODY MASS INDEX: 43.71 KG/M2 | WEIGHT: 256 LBS | HEIGHT: 64 IN | SYSTOLIC BLOOD PRESSURE: 129 MMHG

## 2022-10-19 DIAGNOSIS — N93.9 ABNORMAL UTERINE BLEEDING (AUB): ICD-10-CM

## 2022-10-19 DIAGNOSIS — Z98.890 HISTORY OF COLPOSCOPY: ICD-10-CM

## 2022-10-19 DIAGNOSIS — R87.611 ATYPICAL SQUAMOUS CELLS CANNOT EXCLUDE HIGH GRADE SQUAMOUS INTRAEPITHELIAL LESION ON CYTOLOGIC SMEAR OF CERVIX (ASC-H): Primary | ICD-10-CM

## 2022-10-19 LAB
B-HCG UR QL: NEGATIVE
EXPIRATION DATE: NORMAL
INTERNAL NEGATIVE CONTROL: NEGATIVE
INTERNAL POSITIVE CONTROL: POSITIVE
Lab: NORMAL

## 2022-10-19 PROCEDURE — 57454 BX/CURETT OF CERVIX W/SCOPE: CPT | Performed by: STUDENT IN AN ORGANIZED HEALTH CARE EDUCATION/TRAINING PROGRAM

## 2022-10-19 PROCEDURE — 81025 URINE PREGNANCY TEST: CPT | Performed by: STUDENT IN AN ORGANIZED HEALTH CARE EDUCATION/TRAINING PROGRAM

## 2022-10-20 ENCOUNTER — TELEPHONE (OUTPATIENT)
Dept: INTERNAL MEDICINE | Age: 42
End: 2022-10-20

## 2022-10-20 ENCOUNTER — HOSPITAL ENCOUNTER (EMERGENCY)
Facility: HOSPITAL | Age: 42
Discharge: HOME | End: 2022-10-21
Attending: EMERGENCY MEDICINE | Admitting: EMERGENCY MEDICINE

## 2022-10-20 DIAGNOSIS — R10.84 GENERALIZED ABDOMINAL PAIN: Primary | ICD-10-CM

## 2022-10-20 DIAGNOSIS — R31.9 URINARY TRACT INFECTION WITH HEMATURIA, SITE UNSPECIFIED: ICD-10-CM

## 2022-10-20 DIAGNOSIS — N39.0 URINARY TRACT INFECTION WITH HEMATURIA, SITE UNSPECIFIED: ICD-10-CM

## 2022-10-20 DIAGNOSIS — R10.9 ABDOMINAL PAIN, UNSPECIFIED ABDOMINAL LOCATION: Primary | ICD-10-CM

## 2022-10-20 LAB
ALBUMIN SERPL-MCNC: 4.1 G/DL (ref 3.5–5.2)
ALBUMIN/GLOB SERPL: 1.5 G/DL
ALP SERPL-CCNC: 49 U/L (ref 39–117)
ALT SERPL W P-5'-P-CCNC: 10 U/L (ref 1–33)
ANION GAP SERPL CALCULATED.3IONS-SCNC: 10 MMOL/L (ref 5–15)
AST SERPL-CCNC: 16 U/L (ref 1–32)
BASOPHILS # BLD AUTO: 0.04 10*3/MM3 (ref 0–0.2)
BASOPHILS NFR BLD AUTO: 0.4 % (ref 0–1.5)
BILIRUB SERPL-MCNC: 0.4 MG/DL (ref 0–1.2)
BILIRUB UR QL STRIP: NEGATIVE
BUN SERPL-MCNC: 12 MG/DL (ref 6–20)
BUN/CREAT SERPL: 14.1 (ref 7–25)
CALCIUM SPEC-SCNC: 9 MG/DL (ref 8.6–10.5)
CHLORIDE SERPL-SCNC: 103 MMOL/L (ref 98–107)
CLARITY UR: ABNORMAL
CO2 SERPL-SCNC: 26 MMOL/L (ref 22–29)
COLOR UR: YELLOW
CREAT SERPL-MCNC: 0.85 MG/DL (ref 0.57–1)
DEPRECATED RDW RBC AUTO: 42.7 FL (ref 37–54)
EGFRCR SERPLBLD CKD-EPI 2021: 88.9 ML/MIN/1.73
EOSINOPHIL # BLD AUTO: 0.09 10*3/MM3 (ref 0–0.4)
EOSINOPHIL NFR BLD AUTO: 0.9 % (ref 0.3–6.2)
ERYTHROCYTE [DISTWIDTH] IN BLOOD BY AUTOMATED COUNT: 11.8 % (ref 12.3–15.4)
GLOBULIN UR ELPH-MCNC: 2.7 GM/DL
GLUCOSE SERPL-MCNC: 98 MG/DL (ref 65–99)
GLUCOSE UR STRIP-MCNC: NEGATIVE MG/DL
HCT VFR BLD AUTO: 34.4 % (ref 34–46.6)
HGB BLD-MCNC: 12.2 G/DL (ref 12–15.9)
HGB UR QL STRIP.AUTO: ABNORMAL
IMM GRANULOCYTES # BLD AUTO: 0.03 10*3/MM3 (ref 0–0.05)
IMM GRANULOCYTES NFR BLD AUTO: 0.3 % (ref 0–0.5)
KETONES UR QL STRIP: NEGATIVE
LEUKOCYTE ESTERASE UR QL STRIP.AUTO: ABNORMAL
LIPASE SERPL-CCNC: 29 U/L (ref 13–60)
LYMPHOCYTES # BLD AUTO: 2.88 10*3/MM3 (ref 0.7–3.1)
LYMPHOCYTES NFR BLD AUTO: 27.4 % (ref 19.6–45.3)
MCH RBC QN AUTO: 35.6 PG (ref 26.6–33)
MCHC RBC AUTO-ENTMCNC: 35.5 G/DL (ref 31.5–35.7)
MCV RBC AUTO: 100.3 FL (ref 79–97)
MONOCYTES # BLD AUTO: 0.81 10*3/MM3 (ref 0.1–0.9)
MONOCYTES NFR BLD AUTO: 7.7 % (ref 5–12)
NEUTROPHILS NFR BLD AUTO: 6.66 10*3/MM3 (ref 1.7–7)
NEUTROPHILS NFR BLD AUTO: 63.3 % (ref 42.7–76)
NITRITE UR QL STRIP: NEGATIVE
NRBC BLD AUTO-RTO: 0 /100 WBC (ref 0–0.2)
PH UR STRIP.AUTO: 7 [PH] (ref 5–8)
PLATELET # BLD AUTO: 284 10*3/MM3 (ref 140–450)
PMV BLD AUTO: 9.9 FL (ref 6–12)
POTASSIUM SERPL-SCNC: 3.9 MMOL/L (ref 3.5–5.2)
PROT SERPL-MCNC: 6.8 G/DL (ref 6–8.5)
PROT UR QL STRIP: NEGATIVE
RBC # BLD AUTO: 3.43 10*6/MM3 (ref 3.77–5.28)
SODIUM SERPL-SCNC: 139 MMOL/L (ref 136–145)
SP GR UR STRIP: 1.02 (ref 1–1.03)
UROBILINOGEN UR QL STRIP: ABNORMAL
WBC NRBC COR # BLD: 10.51 10*3/MM3 (ref 3.4–10.8)

## 2022-10-20 PROCEDURE — 85025 COMPLETE CBC W/AUTO DIFF WBC: CPT | Performed by: EMERGENCY MEDICINE

## 2022-10-20 PROCEDURE — 25010000002 ONDANSETRON PER 1 MG: Performed by: EMERGENCY MEDICINE

## 2022-10-20 PROCEDURE — 84484 ASSAY OF TROPONIN QUANT: CPT | Performed by: EMERGENCY MEDICINE

## 2022-10-20 PROCEDURE — 93005 ELECTROCARDIOGRAM TRACING: CPT

## 2022-10-20 PROCEDURE — 93010 ELECTROCARDIOGRAM REPORT: CPT | Performed by: INTERNAL MEDICINE

## 2022-10-20 PROCEDURE — 96376 TX/PRO/DX INJ SAME DRUG ADON: CPT

## 2022-10-20 PROCEDURE — 25010000002 HYDROMORPHONE PER 4 MG: Performed by: EMERGENCY MEDICINE

## 2022-10-20 PROCEDURE — 84703 CHORIONIC GONADOTROPIN ASSAY: CPT | Performed by: EMERGENCY MEDICINE

## 2022-10-20 PROCEDURE — 93005 ELECTROCARDIOGRAM TRACING: CPT | Performed by: EMERGENCY MEDICINE

## 2022-10-20 PROCEDURE — 80053 COMPREHEN METABOLIC PANEL: CPT | Performed by: EMERGENCY MEDICINE

## 2022-10-20 PROCEDURE — 81001 URINALYSIS AUTO W/SCOPE: CPT | Performed by: EMERGENCY MEDICINE

## 2022-10-20 PROCEDURE — 96374 THER/PROPH/DIAG INJ IV PUSH: CPT

## 2022-10-20 PROCEDURE — 96375 TX/PRO/DX INJ NEW DRUG ADDON: CPT

## 2022-10-20 PROCEDURE — 99284 EMERGENCY DEPT VISIT MOD MDM: CPT

## 2022-10-20 PROCEDURE — 83690 ASSAY OF LIPASE: CPT | Performed by: EMERGENCY MEDICINE

## 2022-10-20 RX ORDER — SODIUM CHLORIDE 0.9 % (FLUSH) 0.9 %
10 SYRINGE (ML) INJECTION AS NEEDED
Status: DISCONTINUED | OUTPATIENT
Start: 2022-10-20 | End: 2022-10-21 | Stop reason: HOSPADM

## 2022-10-20 RX ORDER — ONDANSETRON 2 MG/ML
4 INJECTION INTRAMUSCULAR; INTRAVENOUS ONCE
Status: COMPLETED | OUTPATIENT
Start: 2022-10-20 | End: 2022-10-20

## 2022-10-20 RX ORDER — HYDROMORPHONE HYDROCHLORIDE 1 MG/ML
0.5 INJECTION, SOLUTION INTRAMUSCULAR; INTRAVENOUS; SUBCUTANEOUS ONCE
Status: COMPLETED | OUTPATIENT
Start: 2022-10-20 | End: 2022-10-20

## 2022-10-20 RX ADMIN — HYDROMORPHONE HYDROCHLORIDE 0.5 MG: 1 INJECTION, SOLUTION INTRAMUSCULAR; INTRAVENOUS; SUBCUTANEOUS at 23:28

## 2022-10-20 RX ADMIN — ONDANSETRON 4 MG: 2 INJECTION INTRAMUSCULAR; INTRAVENOUS at 23:28

## 2022-10-20 NOTE — TELEPHONE ENCOUNTER
Caller: Brittany Sheppard    Relationship: Self    Best call back number: [phone]    What orders are you requesting (i.e. lab or imaging): ULTRASOUND OF ABDOMEN     In what timeframe would the patient need to come in: AS SOON AS POSSIBLE    Where will you receive your lab/imaging services: [facility]    Additional notes: PATIENT STATES SHE IS REQUESTING THE ULTRASOUND DUE TO HER BEING IN A LOT OF PAIN AND BELIEVES IT MAY BE FROM HER LIVER.    PATIENT IS REQUESTING A CALL BACK TO LET HER KNOW IF THE ORDER CAN BE PLACED OR NOT.

## 2022-10-21 ENCOUNTER — APPOINTMENT (OUTPATIENT)
Dept: CT IMAGING | Facility: HOSPITAL | Age: 42
End: 2022-10-21

## 2022-10-21 ENCOUNTER — TELEMEDICINE (OUTPATIENT)
Dept: INTERNAL MEDICINE | Age: 42
End: 2022-10-21

## 2022-10-21 VITALS
BODY MASS INDEX: 43.94 KG/M2 | HEIGHT: 64 IN | HEART RATE: 75 BPM | TEMPERATURE: 98.9 F | SYSTOLIC BLOOD PRESSURE: 110 MMHG | RESPIRATION RATE: 16 BRPM | OXYGEN SATURATION: 95 % | DIASTOLIC BLOOD PRESSURE: 65 MMHG

## 2022-10-21 DIAGNOSIS — N30.01 ACUTE CYSTITIS WITH HEMATURIA: Primary | ICD-10-CM

## 2022-10-21 DIAGNOSIS — R10.84 GENERALIZED ABDOMINAL PAIN: ICD-10-CM

## 2022-10-21 PROBLEM — J01.11 ACUTE RECURRENT FRONTAL SINUSITIS: Status: RESOLVED | Noted: 2022-09-01 | Resolved: 2022-10-21

## 2022-10-21 LAB
BACTERIA UR QL AUTO: ABNORMAL /HPF
HCG SERPL QL: NEGATIVE
HOLD SPECIMEN: NORMAL
HOLD SPECIMEN: NORMAL
HYALINE CASTS UR QL AUTO: ABNORMAL /LPF
QT INTERVAL: 371 MS
RBC # UR STRIP: ABNORMAL /HPF
REF LAB TEST METHOD: ABNORMAL
SQUAMOUS #/AREA URNS HPF: ABNORMAL /HPF
TROPONIN T SERPL-MCNC: <0.01 NG/ML (ref 0–0.03)
WBC # UR STRIP: ABNORMAL /HPF
WHOLE BLOOD HOLD COAG: NORMAL
WHOLE BLOOD HOLD SPECIMEN: NORMAL

## 2022-10-21 PROCEDURE — 71275 CT ANGIOGRAPHY CHEST: CPT

## 2022-10-21 PROCEDURE — 74177 CT ABD & PELVIS W/CONTRAST: CPT

## 2022-10-21 PROCEDURE — 0 IOPAMIDOL PER 1 ML: Performed by: EMERGENCY MEDICINE

## 2022-10-21 PROCEDURE — 99213 OFFICE O/P EST LOW 20 MIN: CPT | Performed by: NURSE PRACTITIONER

## 2022-10-21 PROCEDURE — 25010000002 HYDROMORPHONE PER 4 MG: Performed by: EMERGENCY MEDICINE

## 2022-10-21 PROCEDURE — 96376 TX/PRO/DX INJ SAME DRUG ADON: CPT

## 2022-10-21 RX ORDER — NITROFURANTOIN 25; 75 MG/1; MG/1
100 CAPSULE ORAL 2 TIMES DAILY
Qty: 10 CAPSULE | Refills: 0 | Status: SHIPPED | OUTPATIENT
Start: 2022-10-21 | End: 2022-10-26

## 2022-10-21 RX ORDER — ONDANSETRON 4 MG/1
4 TABLET, ORALLY DISINTEGRATING ORAL EVERY 8 HOURS PRN
Qty: 9 TABLET | Refills: 0 | Status: SHIPPED | OUTPATIENT
Start: 2022-10-21 | End: 2023-02-08

## 2022-10-21 RX ORDER — DICYCLOMINE HYDROCHLORIDE 10 MG/1
10 CAPSULE ORAL 3 TIMES DAILY PRN
Qty: 21 CAPSULE | Refills: 0 | Status: SHIPPED | OUTPATIENT
Start: 2022-10-21 | End: 2023-02-08

## 2022-10-21 RX ORDER — HYDROMORPHONE HYDROCHLORIDE 1 MG/ML
0.5 INJECTION, SOLUTION INTRAMUSCULAR; INTRAVENOUS; SUBCUTANEOUS ONCE
Status: COMPLETED | OUTPATIENT
Start: 2022-10-21 | End: 2022-10-21

## 2022-10-21 RX ADMIN — IOPAMIDOL 100 ML: 755 INJECTION, SOLUTION INTRAVENOUS at 00:29

## 2022-10-21 RX ADMIN — HYDROMORPHONE HYDROCHLORIDE 0.5 MG: 1 INJECTION, SOLUTION INTRAMUSCULAR; INTRAVENOUS; SUBCUTANEOUS at 01:26

## 2022-10-21 NOTE — ED PROVIDER NOTES
EMERGENCY DEPARTMENT ENCOUNTER    Room Number:  14/14  Date of encounter:  10/21/2022  PCP: Fide Tiwari APRN  Historian: Patient      HPI:  Chief Complaint: Abdominal pain  A complete HPI/ROS/PMH/PSH/SH/FH are unobtainable due to: None    Context: Brittany Sheppard is a 42 y.o. female who presents to the ED c/o upper abdominal pain going on for 4 to 5 days.  Pain has been constant.  Patient with nausea.  Has never had pain like this before.  States the pain radiates to her back.  Patient also reports some pain with deep breath and some right-sided lateral lower chest pain as well.  Patient is unsure if this is distinct from her abdominal pain.      PAST MEDICAL HISTORY  Active Ambulatory Problems     Diagnosis Date Noted   • Abscess of axilla, left 06/24/2014   • Lumbago 03/20/2015   • Acute hepatitis C without hepatic coma 06/24/2014   • Anxiety and depression 06/24/2014   • Asthma 03/16/2015   • Atopic contact dermatitis 11/07/2015   • Benign hypertension 04/05/2017   • Breast abscess of female 03/16/2015   • Cervical high risk HPV (human papillomavirus) test positive 12/07/2016   • Chondromalacia of right patella 08/13/2021   • Chronic pain of right knee 08/13/2021   • Gastroesophageal reflux disease 11/07/2019   • Hamstring tightness of right lower extremity 08/13/2021   • Gastric banding status 11/07/2019   • History of smoking 03/16/2015   • HSV-2 seropositive 07/27/2022   • Intractable chronic migraine without aura and without status migrainosus 07/27/2022   • LGSIL (low grade squamous intraepithelial dysplasia) 01/01/2016   • Morbid obesity (HCC) 08/03/2015   • Morbid obesity with body mass index of 50.0-59.9 in adult ([facility]) 06/24/2014   • Patellofemoral syndrome of both knees 03/17/2015   • Pes anserinus bursitis of right knee 08/13/2021   • Preop cardiovascular exam 10/10/2014   • Primary osteoarthritis of both knees 03/17/2015   • Pruritic rash 11/07/2015   • Sepsis ([facility]) 06/24/2014   • Vitamin D  deficiency 06/26/2019   • Osteoarthritis of multiple joints 07/27/2022   • Acute recurrent frontal sinusitis 09/01/2022     Resolved Ambulatory Problems     Diagnosis Date Noted   • No Resolved Ambulatory Problems     Past Medical History:   Diagnosis Date   • Anemia    • Anxiety    • Arthritis    • Back pain    • Bipolar 1 disorder (HCC)    • COPD (chronic obstructive pulmonary disease) (HCC)    • Depression    • Dyspnea    • Elevated liver enzymes    • GERD (gastroesophageal reflux disease)    • H. pylori infection    • Hepatitis C 03/2015   • Hirsutism    • History of migraine headaches    • HPV in female 04/23/2021   • HPV in female 03/2014   • HPV in female 10/2015   • Hypertension    • IBS (irritable bowel syndrome)    • Insomnia    • LGSIL on Pap smear of cervix 01/2016   • Obesity    • PCOS (polycystic ovarian syndrome)    • PCR positive for herpes simplex virus type 1 (HSV-1) DNA 2018   • Sleep apnea    • Steatosis of liver          PAST SURGICAL HISTORY  Past Surgical History:   Procedure Laterality Date   • APPENDECTOMY  07/19/2010    [facility] [place]   • BREAST SURGERY      I & D of abcess X3   • D & C HYSTEROSCOPY ENDOMETRIAL ABLATION  12/16/2010    menorrhagia; pathology benign   • LAPAROSCOPIC GASTRIC BANDING  03/27/2017    Dr. Prieto Colon [facility]   • LIVER BIOPSY     • PILONIDAL CYST / SINUS EXCISION  09/2008   • POLYPECTOMY     • TOOTH EXTRACTION  2016   • WISDOM TOOTH EXTRACTION  2012         FAMILY HISTORY  Family History   Problem Relation Age of Onset   • Diabetes Father    • Hypertension Father    • Migraines Father    • Alcohol abuse Father    • Hypertension Mother    • Obesity Mother    • Stroke Paternal Grandmother    • Breast cancer Paternal Grandmother    • Hypertension Paternal Grandmother    • Pancreatic cancer Paternal Grandmother    • Stroke Maternal Grandmother    • Breast cancer Maternal Grandmother 72   • Diabetes Maternal Grandmother    • Hypertension Maternal Grandmother    • Stroke Maternal  Grandfather    • Cancer Maternal Grandfather          SOCIAL HISTORY  Social History     Socioeconomic History   • Marital status:    Tobacco Use   • Smoking status: Every Day   • Smokeless tobacco: Never   Substance and Sexual Activity   • Alcohol use: Yes     Comment: occassionally   • Sexual activity: Yes     Partners: Male, Female         ALLERGIES  Cephalexin, Penicillins, Prunus persica, Cefaclor, Codeine, Morphine, and Latex        REVIEW OF SYSTEMS  Review of Systems     All systems reviewed and negative except for those discussed in HPI.       PHYSICAL EXAM    I have reviewed the triage vital signs and nursing notes.    ED Triage Vitals [10/20/22 2108]   Temp Heart Rate Resp BP SpO2   98.9 °F (37.2 °C) 90 18 138/97 96 %      Temp src Heart Rate Source Patient Position BP Location FiO2 (%)   Tympanic -- Sitting Right arm --       Physical Exam  GENERAL: Mild distressed  HENT: nares patent  EYES: no scleral icterus  CV: regular rhythm, regular rate  RESPIRATORY: normal effort  ABDOMEN: soft, tender palpation bilateral upper quadrant tender palpation over right lateral chest wall  MUSCULOSKELETAL: no deformity  NEURO: alert, moves all extremities, follows commands  SKIN: warm, dry        LAB RESULTS  Recent Results (from the past 24 hour(s))   ECG 12 Lead    Collection Time: 10/20/22  9:19 PM   Result Value Ref Range    QT Interval 371 ms   Comprehensive Metabolic Panel    Collection Time: 10/20/22 11:03 PM    Specimen: Arm, Right; Blood   Result Value Ref Range    Glucose 98 65 - 99 mg/dL    BUN 12 6 - 20 mg/dL    Creatinine 0.85 0.57 - 1.00 mg/dL    Sodium 139 136 - 145 mmol/L    Potassium 3.9 3.5 - 5.2 mmol/L    Chloride 103 98 - 107 mmol/L    CO2 26.0 22.0 - 29.0 mmol/L    Calcium 9.0 8.6 - 10.5 mg/dL    Total Protein 6.8 6.0 - 8.5 g/dL    Albumin 4.10 3.50 - 5.20 g/dL    ALT (SGPT) 10 1 - 33 U/L    AST (SGOT) 16 1 - 32 U/L    Alkaline Phosphatase 49 39 - 117 U/L    Total Bilirubin 0.4 0.0 - 1.2  mg/dL    Globulin 2.7 gm/dL    A/G Ratio 1.5 g/dL    BUN/Creatinine Ratio 14.1 7.0 - 25.0    Anion Gap 10.0 5.0 - 15.0 mmol/L    eGFR 88.9 >60.0 mL/min/1.73   Lipase    Collection Time: 10/20/22 11:03 PM    Specimen: Arm, Right; Blood   Result Value Ref Range    Lipase 29 13 - 60 U/L   Green Top (Gel)    Collection Time: 10/20/22 11:03 PM   Result Value Ref Range    Extra Tube Hold for add-ons.    Lavender Top    Collection Time: 10/20/22 11:03 PM   Result Value Ref Range    Extra Tube hold for add-on    Gold Top - SST    Collection Time: 10/20/22 11:03 PM   Result Value Ref Range    Extra Tube Hold for add-ons.    Light Blue Top    Collection Time: 10/20/22 11:03 PM   Result Value Ref Range    Extra Tube Hold for add-ons.    CBC Auto Differential    Collection Time: 10/20/22 11:03 PM    Specimen: Arm, Right; Blood   Result Value Ref Range    WBC 10.51 3.40 - 10.80 10*3/mm3    RBC 3.43 (L) 3.77 - 5.28 10*6/mm3    Hemoglobin 12.2 12.0 - 15.9 g/dL    Hematocrit 34.4 34.0 - 46.6 %    .3 (H) 79.0 - 97.0 fL    MCH 35.6 (H) 26.6 - 33.0 pg    MCHC 35.5 31.5 - 35.7 g/dL    RDW 11.8 (L) 12.3 - 15.4 %    RDW-SD 42.7 37.0 - 54.0 fl    MPV 9.9 6.0 - 12.0 fL    Platelets 284 140 - 450 10*3/mm3    Neutrophil % 63.3 42.7 - 76.0 %    Lymphocyte % 27.4 19.6 - 45.3 %    Monocyte % 7.7 5.0 - 12.0 %    Eosinophil % 0.9 0.3 - 6.2 %    Basophil % 0.4 0.0 - 1.5 %    Immature Grans % 0.3 0.0 - 0.5 %    Neutrophils, Absolute 6.66 1.70 - 7.00 10*3/mm3    Lymphocytes, Absolute 2.88 0.70 - 3.10 10*3/mm3    Monocytes, Absolute 0.81 0.10 - 0.90 10*3/mm3    Eosinophils, Absolute 0.09 0.00 - 0.40 10*3/mm3    Basophils, Absolute 0.04 0.00 - 0.20 10*3/mm3    Immature Grans, Absolute 0.03 0.00 - 0.05 10*3/mm3    nRBC 0.0 0.0 - 0.2 /100 WBC   Troponin    Collection Time: 10/20/22 11:03 PM    Specimen: Arm, Right; Blood   Result Value Ref Range    Troponin T <0.010 0.000 - 0.030 ng/mL   hCG, Serum, Qualitative    Collection Time: 10/20/22 11:03  PM    Specimen: Arm, Right; Blood   Result Value Ref Range    HCG Qualitative Negative Negative   Urinalysis With Microscopic If Indicated (No Culture) - Urine, Clean Catch    Collection Time: 10/20/22 11:04 PM    Specimen: Urine, Clean Catch   Result Value Ref Range    Color, UA Yellow Yellow, Straw    Appearance, UA Cloudy (A) Clear    pH, UA 7.0 5.0 - 8.0    Specific Gravity, UA 1.017 1.005 - 1.030    Glucose, UA Negative Negative    Ketones, UA Negative Negative    Bilirubin, UA Negative Negative    Blood, UA Moderate (2+) (A) Negative    Protein, UA Negative Negative    Leuk Esterase, UA Moderate (2+) (A) Negative    Nitrite, UA Negative Negative    Urobilinogen, UA 1.0 E.U./dL 0.2 - 1.0 E.U./dL   Urinalysis, Microscopic Only - Urine, Clean Catch    Collection Time: 10/20/22 11:04 PM    Specimen: Urine, Clean Catch   Result Value Ref Range    RBC, UA 13-20 (A) None Seen, 0-2 /HPF    WBC, UA 6-12 (A) None Seen, 0-2 /HPF    Bacteria, UA 4+ (A) None Seen /HPF    Squamous Epithelial Cells, UA 31-50 (A) None Seen, 0-2 /HPF    Hyaline Casts, UA 0-2 None Seen /LPF    Methodology Manual Light Microscopy        Ordered the above labs and independently reviewed the results.        RADIOLOGY  CT Angiogram Chest, CT Abdomen Pelvis With Contrast    Result Date: 10/21/2022  CT ANGIOGRAM OF CHEST; CT OF THE ABDOMEN AND PELVIS WITH CONTRAST.  HISTORY: Chest pain and upper abdominal pain  COMPARISON: None available.  TECHNIQUE: Axial CT imaging was obtained through the thorax. IV contrast was administered. Three-D reformatted images were obtained. Axial CT imaging was obtained through the abdomen and pelvis. IV contrast was administered.  FINDINGS: CT CHEST: Images are degraded by poor timing of the contrast bolus. No acute pulmonary thromboembolus is seen. Thoracic aorta is normal in caliber. There is no evidence of dissection. The thyroid gland, trachea, and esophagus appear unremarkable. There is no pleural or pericardial  effusion. No significant coronary artery calcifications are seen. No acute infiltrates are seen. Mediastinal lymph nodes do not appear pathologically enlarged. There is a common origin of the brachiocephalic artery and left common carotid artery. No acute osseous abnormalities are seen.  CT ABDOMEN AND PELVIS: Tiny cyst is suspected within the right hepatic lobe. The patient is status post laparoscopic gastric banding. There is no evidence of obstruction. The adrenal glands, spleen, small bowel, and pancreas are normal, as is the gallbladder. The kidneys enhance symmetrically. There is no hydronephrosis. There is a 5 mm nonobstructing stone within the right kidney. No distal ureteral or bladder stones are seen. Uterus appears unremarkable. Physiologic cysts are noted on the right ovary. Left ovary is normal. Trace amount of free fluid is seen within the pelvis, likely physiologic. The appendix is absent. No acute osseous abnormalities are seen.       1. No acute intrathoracic findings. 2. 5 mm nonobstructing stone within the right kidney.  Radiation dose reduction techniques were utilized, including automated exposure control and exposure modulation based on body size.  This report was finalized on 10/21/2022 1:28 AM by Dr. Denia Hodgson M.D.        I ordered the above noted radiological studies. Reviewed by me and discussed with radiologist.  See dictation for official radiology interpretation.      PROCEDURES    Procedures      MEDICATIONS GIVEN IN ER    Medications   HYDROmorphone (DILAUDID) injection 0.5 mg (0.5 mg Intravenous Given 10/20/22 2328)   ondansetron (ZOFRAN) injection 4 mg (4 mg Intravenous Given 10/20/22 2328)   iopamidol (ISOVUE-370) 76 % injection 100 mL (100 mL Intravenous Given 10/21/22 0029)   HYDROmorphone (DILAUDID) injection 0.5 mg (0.5 mg Intravenous Given 10/21/22 0126)         PROGRESS, DATA ANALYSIS, CONSULTS, AND MEDICAL DECISION MAKING    All labs have been independently reviewed  by me.  All radiology studies have been reviewed by me and discussed with radiologist dictating the report.   EKG's independently viewed and interpreted by me.  Discussion below represents my analysis of pertinent findings related to patient's condition, differential diagnosis, treatment plan and final disposition.        ED Course as of 10/21/22 0534   Thu Oct 20, 2022   2313 EKG          EKG time: 2119  Rhythm/Rate: Sinus rhythm rate 83  P waves and ID: Within normal limits  QRS, axis: Narrow regular  ST and T waves: Within normal limits    Interpreted Contemporaneously by me, independently viewed [TJ]   Fri Oct 21, 2022   0205 Reevaluation: Patient is feeling better.  Will discharge home. [TJ]      ED Course User Index  [TJ] Aj Sigala MD           PPE: The patient wore a surgical mask throughout the entire patient encounter. I wore an N95.    AS OF 05:34 EDT VITALS:    BP - 110/65  HR - 75  TEMP - 98.9 °F (37.2 °C) (Tympanic)  O2 SATS - 95%        DIAGNOSIS  Final diagnoses:   Abdominal pain, unspecified abdominal location   Urinary tract infection with hematuria, site unspecified         DISPOSITION  Discharge           Aj Sigala MD  10/21/22 0534

## 2022-10-21 NOTE — PROGRESS NOTES
I N T E R N A L  M E D I C I N E  Fide Tiwari, CHRIS       ENCOUNTER DATE:  10/21/2022    Brittany Sheppard / 42 y.o. / female        You have chosen to receive care through a telehealth visit.  Do you consent to use a video/audio connection for your medical care today? YES    Location of patient is in [place]   Provider located in [facility] [place] [facility]       CHIEF COMPLAINT / REASON FOR OFFICE VISIT     TELEHEALTH ENCOUNTER:  Ongoing abdominal pain       ASSESSMENT & PLAN     1. Urinary Tract Infection (Primary)        - Continue macrobid 100 mg twice daily for 7 days        - Encouraged po water at least 40 ounces daily    2. Abdominal Pain       - Continue Dicyclomine HCL 10 mg tid as needed       - Continue Zofran 4 mg every 8 hrs as needed for nausea       - Instructed to call Gastroenterology and follow up         SUMMARY/DISCUSSION  • Review and discussion of ER visit yesterday for abdominal pain. States continues with acute abdominal pain for 4-5 days and lumbar pain. Discussion of UTI and s/s and management. Encouraged to complete all of the Macrobid 100 mg bid.   • States believes abdominal pain is related to her Lap Band- instructed patient to call Dr Colon concerning issues. CT scan reviewed with patient and shows 5 mm stone. Patient to ask Dr Colon about stone and interventions. Given IM Dilaudid in the ER with some relief of pain.  • I spent 25 min in direct care of this patient on this date of service. This time includes times spent by me in the following activities: Preparing for the visit, obtaining and/or reviewing a separately obtained history, performing a medically appropriate examination and/or evaluation, reviewing medical records, reviewing tests, ordering medications, tests, or procedures, counseling and educating the patient, documenting information in the medical record and reviewing office note/correspondence from other providers.       Time spent: 25 minutes    Next Appointment  with me: 1/25/2023    No follow-ups on file.        HISTORY OF PRESENT ILLNESS     42 year old female seen via telehealth for ongoing abdominal pain that radiates to her back. Recently seen in the ER yesterday and diagnosed with a UTI. States believes that her abdominal pain is related to her Lap band and surgery.         REVIEWED DATA     Labs:           Imaging:   10/21/2022  CT ANGIOGRAM OF CHEST; CT OF THE ABDOMEN AND PELVIS WITH CONTRAST.  HISTORY: Chest pain and upper abdominal pain  COMPARISON: None available.  TECHNIQUE: Axial CT imaging was obtained through the thorax. IV contrast was administered. Three-D reformatted images were obtained. Axial CT imaging was obtained through the abdomen and pelvis. IV contrast was administered.  FINDINGS: CT CHEST: Images are degraded by poor timing of the contrast bolus. No acute pulmonary thromboembolus is seen. Thoracic aorta is normal in caliber. There is no evidence of dissection. The thyroid gland, trachea, and esophagus appear unremarkable. There is no pleural or pericardial effusion. No significant coronary artery calcifications are seen. No acute infiltrates are seen. Mediastinal lymph nodes do not appear pathologically enlarged. There is a common origin of the brachiocephalic artery and left common carotid artery. No acute osseous abnormalities are seen.  CT ABDOMEN AND PELVIS: Tiny cyst is suspected within the right hepatic lobe. The patient is status post laparoscopic gastric banding. There is no evidence of obstruction. The adrenal glands, spleen, small bowel, and pancreas are normal, as is the gallbladder. The kidneys enhance symmetrically. There is no hydronephrosis. There is a 5 mm nonobstructing stone within the right kidney. No distal ureteral or bladder stones are seen. Uterus appears unremarkable. Physiologic cysts are noted on the right ovary. Left ovary is normal. Trace amount of free fluid is seen within the pelvis, likely physiologic. The appendix  is absent. No acute osseous abnormalities are seen.        1. No acute intrathoracic findings. 2. 5 mm nonobstructing stone within the right kidney.  Radiation dose reduction techniques were utilized, including automated exposure control and exposure modulation based on body size.  This report was finalized on 10/21/2022 1:28 AM by Dr. Denia Hodgson M.D.        Medical Tests:     Brief Urine Lab Results  (Last result in the past 365 days)      Color   Clarity   Blood   Leuk Est   Nitrite   Protein   CREAT   Urine HCG        10/20/22 2304 Yellow   Cloudy   Moderate (2+)   Moderate (2+)   Negative   Negative                   Summary of old records / correspondence / consultant report:           Request outside records:         CHRIS Lisa   none

## 2022-10-21 NOTE — ED TRIAGE NOTES
Pt to ER from home via PV with c/o abd pain that radiates to back, shoulders and chest. Pt states it has been ongoing for approx 4-5 days, denies any n/v/d. Pt states pain starts above the level of her umbilicus, reports still having gallbladder intact.      Pt masked in triage, staff in appropriate ppe.

## 2022-10-24 LAB
DX ICD CODE: NORMAL
DX ICD CODE: NORMAL
PATH REPORT.FINAL DX SPEC: NORMAL
PATH REPORT.GROSS SPEC: NORMAL
PATH REPORT.SITE OF ORIGIN SPEC: NORMAL
PATHOLOGIST NAME: NORMAL
PAYMENT PROCEDURE: NORMAL

## 2022-10-31 ENCOUNTER — TELEPHONE (OUTPATIENT)
Dept: OBSTETRICS AND GYNECOLOGY | Facility: CLINIC | Age: 42
End: 2022-10-31

## 2022-10-31 NOTE — TELEPHONE ENCOUNTER
Attempted to contact the patient regarding colposcopy results but it went straight to voicemail. Left message for patient to contact the office to discuss results further and next steps.     Stefany Wilburn MD

## 2022-11-02 ENCOUNTER — TELEPHONE (OUTPATIENT)
Dept: OBSTETRICS AND GYNECOLOGY | Facility: CLINIC | Age: 42
End: 2022-11-02

## 2022-11-09 NOTE — PROGRESS NOTES
Endometrial Biopsy Procedure Note    Pre-operative Diagnosis: Abnormal uterine bleeding     Post-operative Diagnosis: Same    Indications: abnormal uterine bleeding    Procedure Details    Urine pregnancy test was done and was NEGATIVE .  The risks (including infection, bleeding, pain, and uterine perforation) and benefits of the procedure were explained to the patient and Verbal informed consent was obtained.  Antibiotic prophylaxis against endocarditis was not indicated.     A timeout procedure was performed.  The patient was placed in the dorsal lithotomy position.  Bimanual exam showed the uterus to be in the anteverted position.  A Graves' speculum inserted in the vagina, and the cervix prepped with povidone iodine.       A sharp tenaculum was applied to the anterior lip of the cervix for stabilization.   A Pipelle endometrial aspirator was used to sample the endometrium.  Based on the markings on the pipelle, the uterus sounded to 8 cm.  Small amount of sample was obtained and sent for pathologic examination.    Condition:  Stable    Complications:  None    Plan:  Patient tolerated the procedure well.   The patient was advised to call for any fever or for prolonged or severe pain or bleeding. She was advised to use OTC ibuprofen as needed for mild to moderate pain. She was advised to avoid vaginal intercourse for 48 hours or until the bleeding has completely stopped.    Stefany Wilburn MD

## 2022-11-15 RX ORDER — VALACYCLOVIR HYDROCHLORIDE 500 MG/1
500 TABLET, FILM COATED ORAL 2 TIMES DAILY
Qty: 60 TABLET | Refills: 0 | Status: SHIPPED | OUTPATIENT
Start: 2022-11-15 | End: 2022-12-19

## 2022-12-01 ENCOUNTER — TELEPHONE (OUTPATIENT)
Dept: OBSTETRICS AND GYNECOLOGY | Facility: CLINIC | Age: 42
End: 2022-12-01

## 2022-12-01 NOTE — TELEPHONE ENCOUNTER
Please see whether she would like to proceed in the office or go the the operating room? This procedure is to address cervical dysplasia and will not help with her pain. Thanks!

## 2022-12-01 NOTE — TELEPHONE ENCOUNTER
Patient is calling to let you know she would like to go ahead with the LEEP procedure. She would like to know if it will help with her pain she is having. I did give patient a general description of what a LEEP is.     Thank you

## 2022-12-06 RX ORDER — METOPROLOL TARTRATE 50 MG/1
50 TABLET, FILM COATED ORAL 2 TIMES DAILY
Qty: 180 TABLET | Refills: 0 | Status: SHIPPED | OUTPATIENT
Start: 2022-12-06 | End: 2023-03-21 | Stop reason: SDUPTHER

## 2022-12-06 RX ORDER — HYDROCHLOROTHIAZIDE 25 MG/1
25 TABLET ORAL DAILY
Qty: 90 TABLET | Refills: 0 | Status: SHIPPED | OUTPATIENT
Start: 2022-12-06 | End: 2023-03-21 | Stop reason: SDUPTHER

## 2022-12-21 RX ORDER — VALACYCLOVIR HYDROCHLORIDE 500 MG/1
500 TABLET, FILM COATED ORAL 2 TIMES DAILY
Qty: 90 TABLET | Refills: 2 | Status: SHIPPED | OUTPATIENT
Start: 2022-12-21

## 2022-12-21 RX ORDER — VALACYCLOVIR HYDROCHLORIDE 500 MG/1
500 TABLET, FILM COATED ORAL 2 TIMES DAILY
Qty: 60 TABLET | Refills: 0 | Status: CANCELLED | OUTPATIENT
Start: 2022-12-21 | End: 2023-01-20

## 2023-02-08 ENCOUNTER — APPOINTMENT (OUTPATIENT)
Dept: ULTRASOUND IMAGING | Facility: HOSPITAL | Age: 43
End: 2023-02-08
Payer: COMMERCIAL

## 2023-02-08 ENCOUNTER — TELEPHONE (OUTPATIENT)
Dept: INTERNAL MEDICINE | Age: 43
End: 2023-02-08

## 2023-02-08 ENCOUNTER — OFFICE VISIT (OUTPATIENT)
Dept: INTERNAL MEDICINE | Age: 43
End: 2023-02-08
Payer: COMMERCIAL

## 2023-02-08 ENCOUNTER — HOSPITAL ENCOUNTER (EMERGENCY)
Facility: HOSPITAL | Age: 43
Discharge: HOME | End: 2023-02-08
Attending: EMERGENCY MEDICINE | Admitting: EMERGENCY MEDICINE
Payer: COMMERCIAL

## 2023-02-08 VITALS
TEMPERATURE: 97.6 F | SYSTOLIC BLOOD PRESSURE: 109 MMHG | HEIGHT: 64 IN | OXYGEN SATURATION: 98 % | RESPIRATION RATE: 16 BRPM | DIASTOLIC BLOOD PRESSURE: 71 MMHG | HEART RATE: 72 BPM | WEIGHT: 280 LBS | BODY MASS INDEX: 47.8 KG/M2

## 2023-02-08 VITALS
TEMPERATURE: 97.5 F | WEIGHT: 283 LBS | BODY MASS INDEX: 48.32 KG/M2 | DIASTOLIC BLOOD PRESSURE: 76 MMHG | HEART RATE: 82 BPM | SYSTOLIC BLOOD PRESSURE: 124 MMHG | HEIGHT: 64 IN | OXYGEN SATURATION: 97 %

## 2023-02-08 DIAGNOSIS — L02.91 CUTANEOUS ABSCESS, UNSPECIFIED SITE: Primary | ICD-10-CM

## 2023-02-08 DIAGNOSIS — N61.1 LEFT BREAST ABSCESS: Primary | ICD-10-CM

## 2023-02-08 LAB
ALBUMIN SERPL-MCNC: 3.8 G/DL (ref 3.5–5.2)
ALBUMIN/GLOB SERPL: 1.3 G/DL
ALP SERPL-CCNC: 43 U/L (ref 39–117)
ALT SERPL W P-5'-P-CCNC: 13 U/L (ref 1–33)
ANION GAP SERPL CALCULATED.3IONS-SCNC: 5.1 MMOL/L (ref 5–15)
AST SERPL-CCNC: 14 U/L (ref 1–32)
BASOPHILS # BLD AUTO: 0.02 10*3/MM3 (ref 0–0.2)
BASOPHILS NFR BLD AUTO: 0.2 % (ref 0–1.5)
BILIRUB SERPL-MCNC: 0.5 MG/DL (ref 0–1.2)
BUN SERPL-MCNC: 11 MG/DL (ref 6–20)
BUN/CREAT SERPL: 13.6 (ref 7–25)
CALCIUM SPEC-SCNC: 8.9 MG/DL (ref 8.6–10.5)
CHLORIDE SERPL-SCNC: 107 MMOL/L (ref 98–107)
CO2 SERPL-SCNC: 23.9 MMOL/L (ref 22–29)
CREAT SERPL-MCNC: 0.81 MG/DL (ref 0.57–1)
DEPRECATED RDW RBC AUTO: 43.9 FL (ref 37–54)
EGFRCR SERPLBLD CKD-EPI 2021: 94.2 ML/MIN/1.73
EOSINOPHIL # BLD AUTO: 0.14 10*3/MM3 (ref 0–0.4)
EOSINOPHIL NFR BLD AUTO: 1.6 % (ref 0.3–6.2)
ERYTHROCYTE [DISTWIDTH] IN BLOOD BY AUTOMATED COUNT: 11.9 % (ref 12.3–15.4)
GLOBULIN UR ELPH-MCNC: 2.9 GM/DL
GLUCOSE SERPL-MCNC: 96 MG/DL (ref 65–99)
HCT VFR BLD AUTO: 36.1 % (ref 34–46.6)
HGB BLD-MCNC: 12.7 G/DL (ref 12–15.9)
IMM GRANULOCYTES # BLD AUTO: 0.02 10*3/MM3 (ref 0–0.05)
IMM GRANULOCYTES NFR BLD AUTO: 0.2 % (ref 0–0.5)
LYMPHOCYTES # BLD AUTO: 2.75 10*3/MM3 (ref 0.7–3.1)
LYMPHOCYTES NFR BLD AUTO: 31.5 % (ref 19.6–45.3)
MCH RBC QN AUTO: 35 PG (ref 26.6–33)
MCHC RBC AUTO-ENTMCNC: 35.2 G/DL (ref 31.5–35.7)
MCV RBC AUTO: 99.4 FL (ref 79–97)
MONOCYTES # BLD AUTO: 0.64 10*3/MM3 (ref 0.1–0.9)
MONOCYTES NFR BLD AUTO: 7.3 % (ref 5–12)
NEUTROPHILS NFR BLD AUTO: 5.15 10*3/MM3 (ref 1.7–7)
NEUTROPHILS NFR BLD AUTO: 59.2 % (ref 42.7–76)
NRBC BLD AUTO-RTO: 0 /100 WBC (ref 0–0.2)
PLATELET # BLD AUTO: 241 10*3/MM3 (ref 140–450)
PMV BLD AUTO: 10.4 FL (ref 6–12)
POTASSIUM SERPL-SCNC: 3.8 MMOL/L (ref 3.5–5.2)
PROT SERPL-MCNC: 6.7 G/DL (ref 6–8.5)
RBC # BLD AUTO: 3.63 10*6/MM3 (ref 3.77–5.28)
SODIUM SERPL-SCNC: 136 MMOL/L (ref 136–145)
WBC NRBC COR # BLD: 8.72 10*3/MM3 (ref 3.4–10.8)

## 2023-02-08 PROCEDURE — 87070 CULTURE OTHR SPECIMN AEROBIC: CPT | Performed by: PHYSICIAN ASSISTANT

## 2023-02-08 PROCEDURE — 36415 COLL VENOUS BLD VENIPUNCTURE: CPT

## 2023-02-08 PROCEDURE — 87205 SMEAR GRAM STAIN: CPT | Performed by: PHYSICIAN ASSISTANT

## 2023-02-08 PROCEDURE — 99283 EMERGENCY DEPT VISIT LOW MDM: CPT

## 2023-02-08 PROCEDURE — 0 LIDOCAINE 1 % SOLUTION: Performed by: RADIOLOGY

## 2023-02-08 PROCEDURE — 75989 ABSCESS DRAINAGE UNDER X-RAY: CPT

## 2023-02-08 PROCEDURE — 76642 ULTRASOUND BREAST LIMITED: CPT

## 2023-02-08 PROCEDURE — 85025 COMPLETE CBC W/AUTO DIFF WBC: CPT | Performed by: PHYSICIAN ASSISTANT

## 2023-02-08 PROCEDURE — 80053 COMPREHEN METABOLIC PANEL: CPT | Performed by: PHYSICIAN ASSISTANT

## 2023-02-08 PROCEDURE — 99213 OFFICE O/P EST LOW 20 MIN: CPT

## 2023-02-08 RX ORDER — SULFAMETHOXAZOLE AND TRIMETHOPRIM 800; 160 MG/1; MG/1
2 TABLET ORAL 2 TIMES DAILY
Qty: 40 TABLET | Refills: 0 | Status: SHIPPED | OUTPATIENT
Start: 2023-02-08 | End: 2023-02-18

## 2023-02-08 RX ORDER — LIDOCAINE HYDROCHLORIDE 10 MG/ML
10 INJECTION, SOLUTION INFILTRATION; PERINEURAL ONCE
Status: COMPLETED | OUTPATIENT
Start: 2023-02-08 | End: 2023-02-08

## 2023-02-08 RX ADMIN — LIDOCAINE HYDROCHLORIDE 1 ML: 10 INJECTION, SOLUTION INFILTRATION; PERINEURAL at 15:09

## 2023-02-08 NOTE — ED PROVIDER NOTES
EMERGENCY DEPARTMENT ENCOUNTER    Room Number:  T01/01  Date of encounter:  2/8/2023  PCP: Fide Tiwari APRN  Patient Care Team:  Fide Tiwari APRN as PCP - General (Family Medicine)   Independent Historians: Patient    HPI:  Chief Complaint: Abscess  A complete HPI/ROS/PMH/PSH/SH/FH are unobtainable due to: None    Chronic or social conditions impacting patient care (social determinants of health): None    Context: Brittany Sheppard is a 42 y.o. female who presents to the ED c/o abscess beneath left breast that she noticed few days ago.  She was seen by her primary care provider this morning and referred to the ER for further evaluation.  Patient reports history of a right breast abscess that required several operations and IV antibiotics.  She has not had any fevers or chills over the past couple of days.  She does report feeling some fatigue yesterday.  She is not currently on any medication for the symptoms.  She has not had any active drainage from the abscess.    Review of prior external notes (non-ED): Reviewed office visit with primary care CHRIS Buenrostro prior to arrival.  Diagnosed with cutaneous abscess and referred to ER for further assessment.      Review of prior external test results outside of this encounter: CT angiogram of the chest on 10/21/2022 showed no acute intrathoracic findings.    PAST MEDICAL HISTORY  Active Ambulatory Problems     Diagnosis Date Noted   • Abscess of axilla, left 06/24/2014   • Lumbago 03/20/2015   • Acute hepatitis C without hepatic coma 06/24/2014   • Anxiety and depression 06/24/2014   • Asthma 03/16/2015   • Atopic contact dermatitis 11/07/2015   • Benign hypertension 04/05/2017   • Breast abscess of female 03/16/2015   • Cervical high risk HPV (human papillomavirus) test positive 12/07/2016   • Chondromalacia of right patella 08/13/2021   • Chronic pain of right knee 08/13/2021   • Gastroesophageal reflux disease 11/07/2019   • Hamstring tightness of right lower  extremity 08/13/2021   • Gastric banding status 11/07/2019   • History of smoking 03/16/2015   • HSV-2 seropositive 07/27/2022   • Intractable chronic migraine without aura and without status migrainosus 07/27/2022   • LGSIL (low grade squamous intraepithelial dysplasia) 01/01/2016   • Morbid obesity (Formerly [facility]) 08/03/2015   • Morbid obesity with body mass index of 50.0-59.9 in adult (Formerly [facility]) 06/24/2014   • Patellofemoral syndrome of both knees 03/17/2015   • Pes anserinus bursitis of right knee 08/13/2021   • Primary osteoarthritis of both knees 03/17/2015   • Pruritic rash 11/07/2015   • Sepsis (Formerly [facility]) 06/24/2014   • Vitamin D deficiency 06/26/2019   • Osteoarthritis of multiple joints 07/27/2022   • Acute cystitis with hematuria 10/21/2022   • Generalized abdominal pain 10/21/2022     Resolved Ambulatory Problems     Diagnosis Date Noted   • Preop cardiovascular exam 10/10/2014   • Acute recurrent frontal sinusitis 09/01/2022     Past Medical History:   Diagnosis Date   • Anemia    • Anxiety    • Arthritis    • Back pain    • Bipolar 1 disorder (Formerly [facility])    • COPD (chronic obstructive pulmonary disease) (Formerly [facility])    • Depression    • Dyspnea    • Elevated liver enzymes    • GERD (gastroesophageal reflux disease)    • H. pylori infection    • Hepatitis C 03/2015   • Hirsutism    • History of migraine headaches    • HPV in female 04/23/2021   • HPV in female 03/2014   • HPV in female 10/2015   • Hypertension    • IBS (irritable bowel syndrome)    • Insomnia    • LGSIL on Pap smear of cervix 01/2016   • Obesity    • PCOS (polycystic ovarian syndrome)    • PCR positive for herpes simplex virus type 1 (HSV-1) DNA 2018   • Sleep apnea    • Steatosis of liver        The patient has started, but not completed, their COVID-19 vaccination series.    PAST SURGICAL HISTORY  Past Surgical History:   Procedure Laterality Date   • APPENDECTOMY  07/19/2010    [facility] [place]   • BREAST SURGERY      I & D of abcess X3   • D & C HYSTEROSCOPY ENDOMETRIAL  ABLATION  12/16/2010    menorrhagia; pathology benign   • LAPAROSCOPIC GASTRIC BANDING  03/27/2017    Dr. Prieto Colon [facility]   • LIVER BIOPSY     • PILONIDAL CYST / SINUS EXCISION  09/2008   • POLYPECTOMY     • TOOTH EXTRACTION  2016   • WISDOM TOOTH EXTRACTION  2012         FAMILY HISTORY  Family History   Problem Relation Age of Onset   • Diabetes Father    • Hypertension Father    • Migraines Father    • Alcohol abuse Father    • Hypertension Mother    • Obesity Mother    • Stroke Paternal Grandmother    • Breast cancer Paternal Grandmother    • Hypertension Paternal Grandmother    • Pancreatic cancer Paternal Grandmother    • Stroke Maternal Grandmother    • Breast cancer Maternal Grandmother 72   • Diabetes Maternal Grandmother    • Hypertension Maternal Grandmother    • Stroke Maternal Grandfather    • Cancer Maternal Grandfather          SOCIAL HISTORY  Social History     Socioeconomic History   • Marital status:    Tobacco Use   • Smoking status: Every Day   • Smokeless tobacco: Never   Substance and Sexual Activity   • Alcohol use: Yes     Comment: occassionally   • Sexual activity: Yes     Partners: Male, Female         ALLERGIES  Cephalexin, Penicillins, Prunus persica, Cefaclor, Codeine, Morphine, and Latex        REVIEW OF SYSTEMS  Review of Systems   Constitutional: Positive for fatigue. Negative for chills and fever.   Respiratory: Negative for shortness of breath.    Cardiovascular: Negative for chest pain.   Gastrointestinal: Negative for abdominal pain, nausea and vomiting.   Skin: Positive for wound (Abscess left breast).        All systems reviewed and negative except for those discussed in HPI.       PHYSICAL EXAM    I have reviewed the triage vital signs and nursing notes.    ED Triage Vitals   Temp Heart Rate Resp BP SpO2   02/08/23 1014 02/08/23 1014 02/08/23 1014 02/08/23 1016 02/08/23 1014   97.6 °F (36.4 °C) 90 16 (!) 159/103 99 %      Temp src Heart Rate Source Patient Position BP  Location FiO2 (%)   -- -- 02/08/23 1016 02/08/23 1016 --     Standing Right arm        Physical Exam  GENERAL: alert, no acute distress  SKIN: Warm, dry, approximate quarter sized area of fluctuance and tenderness to the medial aspect of the left lower  HENT: Normocephalic, atraumatic  EYES: no scleral icterus  CV: regular rhythm, regular rate  RESPIRATORY: normal effort, lungs clear  ABDOMEN: soft, nontender, nondistended  MUSCULOSKELETAL: no deformity  NEURO: alert, moves all extremities, follows commands          LAB RESULTS  Recent Results (from the past 24 hour(s))   Comprehensive Metabolic Panel    Collection Time: 02/08/23  3:31 PM    Specimen: Blood   Result Value Ref Range    Glucose 96 65 - 99 mg/dL    BUN 11 6 - 20 mg/dL    Creatinine 0.81 0.57 - 1.00 mg/dL    Sodium 136 136 - 145 mmol/L    Potassium 3.8 3.5 - 5.2 mmol/L    Chloride 107 98 - 107 mmol/L    CO2 23.9 22.0 - 29.0 mmol/L    Calcium 8.9 8.6 - 10.5 mg/dL    Total Protein 6.7 6.0 - 8.5 g/dL    Albumin 3.8 3.5 - 5.2 g/dL    ALT (SGPT) 13 1 - 33 U/L    AST (SGOT) 14 1 - 32 U/L    Alkaline Phosphatase 43 39 - 117 U/L    Total Bilirubin 0.5 0.0 - 1.2 mg/dL    Globulin 2.9 gm/dL    A/G Ratio 1.3 g/dL    BUN/Creatinine Ratio 13.6 7.0 - 25.0    Anion Gap 5.1 5.0 - 15.0 mmol/L    eGFR 94.2 >60.0 mL/min/1.73   CBC Auto Differential    Collection Time: 02/08/23  3:31 PM    Specimen: Blood   Result Value Ref Range    WBC 8.72 3.40 - 10.80 10*3/mm3    RBC 3.63 (L) 3.77 - 5.28 10*6/mm3    Hemoglobin 12.7 12.0 - 15.9 g/dL    Hematocrit 36.1 34.0 - 46.6 %    MCV 99.4 (H) 79.0 - 97.0 fL    MCH 35.0 (H) 26.6 - 33.0 pg    MCHC 35.2 31.5 - 35.7 g/dL    RDW 11.9 (L) 12.3 - 15.4 %    RDW-SD 43.9 37.0 - 54.0 fl    MPV 10.4 6.0 - 12.0 fL    Platelets 241 140 - 450 10*3/mm3    Neutrophil % 59.2 42.7 - 76.0 %    Lymphocyte % 31.5 19.6 - 45.3 %    Monocyte % 7.3 5.0 - 12.0 %    Eosinophil % 1.6 0.3 - 6.2 %    Basophil % 0.2 0.0 - 1.5 %    Immature Grans % 0.2 0.0 - 0.5 %     Neutrophils, Absolute 5.15 1.70 - 7.00 10*3/mm3    Lymphocytes, Absolute 2.75 0.70 - 3.10 10*3/mm3    Monocytes, Absolute 0.64 0.10 - 0.90 10*3/mm3    Eosinophils, Absolute 0.14 0.00 - 0.40 10*3/mm3    Basophils, Absolute 0.02 0.00 - 0.20 10*3/mm3    Immature Grans, Absolute 0.02 0.00 - 0.05 10*3/mm3    nRBC 0.0 0.0 - 0.2 /100 WBC       Ordered the above labs and independently reviewed and interpreted the results.        RADIOLOGY  US Guided Abscess Drain Drainage or Aspiration    Result Date: 2/8/2023  US GUIDED ABSCESS DRAIN DRAINAGE AND OR ASPIRATION-  CLINICAL INDICATION: 42 years old female presents for ultrasound-guided needle aspiration of a 1.6 cm superficial abscess at 8:00 in the left breast.  PRE-PROCEDURAL CONSULTATION: Details of the procedure and possible limitations and complications were discussed with the patient. After addressing her questions and concerns, written informed consent was obtained.  PROCEDURE: The superficial collection at 8:00, 12 cm from the nipple in the left breast was targeted under ultrasound. The skin of the breast was cleansed and prepped. <1 mL of 1% lidocaine was used for local anesthesia. An 18-gauge needle was inserted into the collection. Approximately 1 mL of purulent and bloody fluid was aspirated and sent for Gram stain and culture. The collection decreased in size. There were no immediate complications.        1. Successful ultrasound-guided needle aspiration of a 1.6 cm superficial abscess at 8:00 in the left breast. Recommend targeted antibiotic therapy and clinical follow-up, as well as short-term follow-up targeted ultrasound in 4-6 weeks to document complete resolution.  This report was finalized on 2/8/2023 3:15 PM by Dr. Grecia Ernst M.D.      US Breast Left Limited    Result Date: 2/8/2023  US BREAST LEFT LIMITED-  CLINICAL INDICATION: 42 years old woman presents for targeted left breast ultrasound with clinical concern for breast abscess.  COMPARISON:  None.  FINDINGS: Targeted sonographic evaluation of the left breast was performed in the area of concern indicated by the patient. At 8:00 near the inframammary fold in the left breast, 12 cm from the nipple, there is a palpable 1.4 x 0.7 x 1.6 cm superficial, partially intradermal collection with surrounding ill-defined edema suggestive of an abscess.       Palpable 1.4 cm collection at 8:00 near the inframammary fold in the left breast, suggestive of an abscess. Recommend ultrasound-guided needle aspiration and targeted antibiotic therapy, followed by short-term follow-up targeted ultrasound in 4-6 weeks to document complete resolution.  BI-RADS Category 4: Suspicious  This report was finalized on 2/8/2023 1:27 PM by Dr. Grecia Ernst M.D.        I ordered the above noted radiological studies. Independently reviewed and interpreted by me.  See dictation for official radiology interpretation.      PROCEDURES    Procedures      MEDICATIONS GIVEN IN ER    Medications   lidocaine (XYLOCAINE) 1 % injection 10 mL (1 mL Injection Given 2/8/23 1509)         PROGRESS, DATA ANALYSIS, CONSULTS, AND MEDICAL DECISION MAKING    All labs have been independently reviewed and interpreted by me.  All radiology studies have been independently reviewed and interpreted by me and discussed with radiologist dictating the report.   EKG's independently reviewed and interpreted by me.  Discussion below represents my analysis of pertinent findings related to patient's condition, differential diagnosis, treatment plan and final disposition.    Differential diagnosis: abscess, cellulitis, breast mass    ED Course as of 02/08/23 1809 Wed Feb 08, 2023   1328 Discussing with Dr. Ernst, radiologist, via phone.  She notes a superficial skin abscess of the breast.  She requests an ultrasound-guided drainage order to be placed and she will drain it at radiology. [TR]   1511 Pt not yet back to room from US [DC]   1543 WBC: 8.72 [DC]   1544  Hemoglobin: 12.7 [DC]   1549 US reports obtaining approximately 1cc of purulent fluid from breast abscess which will be sent for culture. [DC]   1613 Discussed case with ER Pharmacist, Mora, who recommends using Bactrim DS 2 tabs BID as patient has allergies to cephalosporins and penicillins.  [DC]      ED Course User Index  [DC] Marietta Balbuena PA  [TR] Kwame Malik MD           PPE: Patient was placed in face mask in first look. Patient was wearing facemask when I entered the room and throughout our encounter. I wore full protective equipment throughout this patient encounter including a face mask, and gloves. Hand hygiene was performed before donning protective equipment and after removal when leaving the room.        AS OF 18:09 EST VITALS:    BP - 109/71  HR - 72  TEMP - 97.6 °F (36.4 °C)  O2 SATS - 98%        DIAGNOSIS  Final diagnoses:   Left breast abscess         DISPOSITION  ED Disposition     ED Disposition   Discharge    Condition   Stable    Comment   --                Note Disclaimer: At [facility], we believe that sharing information builds trust and better relationships. You are receiving this note because you recently visited [facility]. It is possible you will see health information before a provider has talked with you about it. This kind of information can be easy to misunderstand. To help you fully understand what it means for your health, we urge you to discuss this note with your provider.       Marietta Balbuena PA  02/08/23 9806

## 2023-02-08 NOTE — TELEPHONE ENCOUNTER
Caller: Brittany Sheppard    Relationship: Self    Best call back number: [phone]    What is the best time to reach you: ANY    Who are you requesting to speak with (clinical staff, provider,  specific staff member): CLINICAL      What was the call regarding: SPOT ON LEFT BREAST ON THE INSIDE AND IS IN A LOT OF PAIN    Do you require a callback: YES

## 2023-02-08 NOTE — PROGRESS NOTES
"    I N T E R N A L  M E D I C I N E  [facility]    ENCOUNTER DATE:  02/08/2023    Brittany Sheppard / 42 y.o. / female      CHIEF COMPLAINT / REASON FOR OFFICE VISIT     Breast Problem (Knot in left breast /Painful, swollen,hot to touch )      ASSESSMENT & PLAN     Diagnoses and all orders for this visit:    1. Cutaneous abscess, unspecified site (Primary)         SUMMARY/DISCUSSION  • Referred to ER for further assessment, possible IV antibiotics and I&D of left breast abscess.  Pt is agreeable for transfer.      Next Appointment with me: Visit date not found    Return for Schedule follow up with Fide PEREZ.      VITAL SIGNS     Visit Vitals  /76   Pulse 82   Temp 97.5 °F (36.4 °C)   Ht 162.6 cm (64.02\")   Wt 128 kg (283 lb)   LMP 01/17/2023 (Exact Date)   SpO2 97%   BMI 48.55 kg/m²             Wt Readings from Last 3 Encounters:   02/08/23 127 kg (280 lb)   02/08/23 128 kg (283 lb)   10/19/22 116 kg (256 lb)     Body mass index is 48.55 kg/m².        MEDICATIONS AT THE TIME OF OFFICE VISIT     Current Outpatient Medications on File Prior to Visit   Medication Sig Dispense Refill   • hydroCHLOROthiazide (HYDRODIURIL) 25 MG tablet Take 1 tablet by mouth daily. 90 tablet 0   • ibuprofen (ADVIL,MOTRIN) 800 MG tablet Take 1 tablet by mouth Every 8 (Eight) Hours.     • metoprolol tartrate (LOPRESSOR) 50 MG tablet Take 1 tablet by mouth 2 (Two) Times a Day. 180 tablet 0   • multivitamin with minerals tablet tablet Daily.     • valACYclovir (Valtrex) 500 MG tablet Take 1 tablet by mouth 2 (Two) Times a Day. 90 tablet 2   • [DISCONTINUED] dicyclomine (BENTYL) 10 MG capsule Take 1 capsule by mouth 3 (Three) Times a Day As Needed (abdominal pain). 21 capsule 0   • [DISCONTINUED] ondansetron ODT (ZOFRAN-ODT) 4 MG disintegrating tablet Place 1 tablet on the tongue Every 8 (Eight) Hours As Needed for Nausea or Vomiting. 9 tablet 0     No current facility-administered medications on file prior to visit.        HISTORY " OF PRESENT ILLNESS     Here today with complaint of new left breast abscess x 3 days.  Symptoms started on Monday with a painful nodule under her left breast.  She was unable to sleep last night due to the pain.  Today, she reports abscess is warm and extremely tender.  Denies any fever, chills, drainage or discharge.  Last night she did start to become fatigued.  Her past medical history is significant for multiple I&Ds of right breast abscess approximately 10 years ago, requiring surgery and IV anbitioics.  She reports oral antibiotics failed to resolve her symptoms at that time.  Breast cancer history in maternal grandmother and paternal grandfather.  She has had prior breast ultrasounds but no mammogram.        Patient Care Team:  Fide Tiwari APRN as PCP - General (Family Medicine)    REVIEW OF SYSTEMS     Review of Systems   Constitutional: Positive for fatigue. Negative for chills, fever and unexpected weight change.   Respiratory: Negative for cough, chest tightness and shortness of breath.    Cardiovascular: Negative for chest pain, palpitations and leg swelling.   Skin: Positive for color change.   Neurological: Negative for dizziness, weakness, light-headedness and headaches.   Psychiatric/Behavioral: The patient is not nervous/anxious.           PHYSICAL EXAMINATION     Physical Exam  Vitals reviewed.   Constitutional:       General: She is not in acute distress.     Appearance: Normal appearance. She is not ill-appearing, toxic-appearing or diaphoretic.   HENT:      Head: Normocephalic and atraumatic.   Cardiovascular:      Rate and Rhythm: Normal rate and regular rhythm.      Heart sounds: Normal heart sounds.   Pulmonary:      Effort: Pulmonary effort is normal.      Breath sounds: Normal breath sounds.   Chest:          Comments: Approximately quarter sized warm, erythremic, tender, non draining abbess under left breast fold  Neurological:      Mental Status: She is alert and oriented to person,  place, and time. Mental status is at baseline.   Psychiatric:         Mood and Affect: Mood normal.         Behavior: Behavior normal.         Thought Content: Thought content normal.         Judgment: Judgment normal.           REVIEWED DATA     Labs:           Imaging:            Medical Tests:           Summary of old records / correspondence / consultant report:           Request outside records:

## 2023-02-08 NOTE — ED TRIAGE NOTES
From PCP office with c/o non-draining L breast abscess x 3 days.  Hx of similar abscess on R breast.  Pt wearing mask on arrival.

## 2023-02-08 NOTE — ED PROVIDER NOTES
MD ATTESTATION NOTE    The RAISA and I have discussed this patient's history, physical exam, and treatment plan.  I have reviewed the documentation and personally had a face to face interaction with the patient. I affirm the documentation and agree with the treatment and plan.  The attached note describes my personal findings.    I provided a substantive portion of the care of this patient. I personally performed the physical exam, in its entirety.    Independent Historians: Patient    A complete HPI/ROS/PMH/PSH/SH/FH are unobtainable due to: Nothing    Chronic or social conditions impacting patient care (social determinants of health): None    Brittany Sheppard is a 42 y.o. female who presents to the ED c/o left breast abscess.  She reports that she previously has had a breast abscess on the right several years ago.  She reports that over the last few days she has developed an abscess to her left breast.  She saw her primary care doctor today who directed her here for further evaluation.  She does report she has a history of breast cancer in both of her grandparents.  She reports her mother had problems with breast  abscesses.  She denies any drainage from it.  She has not had any treatment for her symptoms.      Review of prior external notes (non-ED): Primary care note dated today with cutaneous abscess directing her to the emergency room for further evaluation.    Review of prior external test results outside of this encounter: CTA of the chest dated 10/21/2022 with no acute findings.    On exam:  GENERAL: Awake, alert, no acute distress  SKIN: Warm, dry  HENT: Normocephalic, atraumatic  EYES: no scleral icterus  CV: regular rhythm, regular rate.  Chest wall with left-sided breast abscess at the outside margin at the 7 o'clock position.  No drainage.  RESPIRATORY: normal effort, lungs clear  ABDOMEN: soft, nontender, nondistended  MUSCULOSKELETAL: no deformity  NEURO: alert, moves all extremities, follows  commands    Labs  No results found for this or any previous visit (from the past 24 hour(s)).    Radiology  No Radiology Exams Resulted Within Past 24 Hours    Medical Decision Making:  ED Course as of 02/09/23 1823 Wed Feb 08, 2023   1328 Discussing with Dr. Ernst, radiologist, via phone.  She notes a superficial skin abscess of the breast.  She requests an ultrasound-guided drainage order to be placed and she will drain it at radiology. [TR]   1511 Pt not yet back to room from US [DC]   1543 WBC: 8.72 [DC]   1543 Hemoglobin: 12.7 [DC]   1549 US reports obtaining approximately 1cc of purulent fluid from breast abscess which will be sent for culture. [DC]   1613 Discussed case with ER Pharmacist, Mora, who recommends using Bactrim DS 2 tabs BID as patient has allergies to cephalosporins and penicillins.  [DC]      ED Course User Index  [DC] Marietta Balbuena PA  [TR] Kwame Malik MD       Plan breast ultrasound to rule out breast cancer or deeper abscess. We will check chemistries, blood counts.     Procedures:  Procedures      PPE: The patient wore a mask and I wore an N95 mask throughout the entire patient encounter.      The patient has started, but not completed, their COVID-19 vaccination series.    Diagnosis  Final diagnoses:   Left breast abscess       Note Disclaimer: At [facility], we believe that sharing information builds trust and better relationships. You are receiving this note because you recently visited [facility]. It is possible you will see health information before a provider has talked with you about it. This kind of information can be easy to misunderstand. To help you fully understand what it means for your health, we urge you to discuss this note with your provider.     Kwame Malik MD  02/08/23 1546       Kwame Malik MD  02/09/23 1823

## 2023-02-11 LAB
BACTERIA FLD CULT: ABNORMAL
GRAM STN SPEC: ABNORMAL
GRAM STN SPEC: ABNORMAL

## 2023-02-13 DIAGNOSIS — N63.20 MASS OF LEFT BREAST, UNSPECIFIED QUADRANT: Primary | ICD-10-CM

## 2023-03-21 RX ORDER — METOPROLOL TARTRATE 50 MG/1
50 TABLET, FILM COATED ORAL 2 TIMES DAILY
Qty: 180 TABLET | Refills: 0 | Status: SHIPPED | OUTPATIENT
Start: 2023-03-21 | End: 2023-06-27

## 2023-03-21 RX ORDER — HYDROCHLOROTHIAZIDE 25 MG/1
25 TABLET ORAL DAILY
Qty: 90 TABLET | Refills: 0 | Status: SHIPPED | OUTPATIENT
Start: 2023-03-21

## 2023-03-23 ENCOUNTER — APPOINTMENT (OUTPATIENT)
Dept: GENERAL RADIOLOGY | Facility: HOSPITAL | Age: 43
End: 2023-03-23
Payer: COMMERCIAL

## 2023-03-23 ENCOUNTER — HOSPITAL ENCOUNTER (EMERGENCY)
Facility: HOSPITAL | Age: 43
Discharge: HOME | End: 2023-03-23
Attending: EMERGENCY MEDICINE | Admitting: EMERGENCY MEDICINE
Payer: COMMERCIAL

## 2023-03-23 VITALS
HEIGHT: 64 IN | HEART RATE: 71 BPM | TEMPERATURE: 98.5 F | RESPIRATION RATE: 17 BRPM | BODY MASS INDEX: 46.1 KG/M2 | DIASTOLIC BLOOD PRESSURE: 81 MMHG | WEIGHT: 270 LBS | OXYGEN SATURATION: 96 % | SYSTOLIC BLOOD PRESSURE: 142 MMHG

## 2023-03-23 DIAGNOSIS — R07.9 CHEST PAIN, UNSPECIFIED TYPE: Primary | ICD-10-CM

## 2023-03-23 LAB
ALBUMIN SERPL-MCNC: 4.4 G/DL (ref 3.5–5.2)
ALBUMIN/GLOB SERPL: 1.5 G/DL
ALP SERPL-CCNC: 55 U/L (ref 39–117)
ALT SERPL W P-5'-P-CCNC: 13 U/L (ref 1–33)
ANION GAP SERPL CALCULATED.3IONS-SCNC: 10.6 MMOL/L (ref 5–15)
AST SERPL-CCNC: 16 U/L (ref 1–32)
BASOPHILS # BLD AUTO: 0.04 10*3/MM3 (ref 0–0.2)
BASOPHILS NFR BLD AUTO: 0.4 % (ref 0–1.5)
BILIRUB SERPL-MCNC: 0.4 MG/DL (ref 0–1.2)
BUN SERPL-MCNC: 13 MG/DL (ref 6–20)
BUN/CREAT SERPL: 16.7 (ref 7–25)
CALCIUM SPEC-SCNC: 9.4 MG/DL (ref 8.6–10.5)
CHLORIDE SERPL-SCNC: 100 MMOL/L (ref 98–107)
CO2 SERPL-SCNC: 24.4 MMOL/L (ref 22–29)
CREAT SERPL-MCNC: 0.78 MG/DL (ref 0.57–1)
DEPRECATED RDW RBC AUTO: 44.4 FL (ref 37–54)
EGFRCR SERPLBLD CKD-EPI 2021: 98.6 ML/MIN/1.73
EOSINOPHIL # BLD AUTO: 0.15 10*3/MM3 (ref 0–0.4)
EOSINOPHIL NFR BLD AUTO: 1.6 % (ref 0.3–6.2)
ERYTHROCYTE [DISTWIDTH] IN BLOOD BY AUTOMATED COUNT: 12.3 % (ref 12.3–15.4)
GLOBULIN UR ELPH-MCNC: 3 GM/DL
GLUCOSE SERPL-MCNC: 138 MG/DL (ref 65–99)
HCG SERPL QL: NEGATIVE
HCT VFR BLD AUTO: 38.2 % (ref 34–46.6)
HGB BLD-MCNC: 13.1 G/DL (ref 12–15.9)
HOLD SPECIMEN: NORMAL
IMM GRANULOCYTES # BLD AUTO: 0.03 10*3/MM3 (ref 0–0.05)
IMM GRANULOCYTES NFR BLD AUTO: 0.3 % (ref 0–0.5)
LYMPHOCYTES # BLD AUTO: 3.45 10*3/MM3 (ref 0.7–3.1)
LYMPHOCYTES NFR BLD AUTO: 36 % (ref 19.6–45.3)
MCH RBC QN AUTO: 34.4 PG (ref 26.6–33)
MCHC RBC AUTO-ENTMCNC: 34.3 G/DL (ref 31.5–35.7)
MCV RBC AUTO: 100.3 FL (ref 79–97)
MONOCYTES # BLD AUTO: 0.6 10*3/MM3 (ref 0.1–0.9)
MONOCYTES NFR BLD AUTO: 6.3 % (ref 5–12)
NEUTROPHILS NFR BLD AUTO: 5.32 10*3/MM3 (ref 1.7–7)
NEUTROPHILS NFR BLD AUTO: 55.4 % (ref 42.7–76)
NRBC BLD AUTO-RTO: 0 /100 WBC (ref 0–0.2)
NT-PROBNP SERPL-MCNC: <5 PG/ML (ref 0–450)
PLATELET # BLD AUTO: 264 10*3/MM3 (ref 140–450)
PMV BLD AUTO: 10.4 FL (ref 6–12)
POTASSIUM SERPL-SCNC: 3.5 MMOL/L (ref 3.5–5.2)
PROT SERPL-MCNC: 7.4 G/DL (ref 6–8.5)
QT INTERVAL: 374 MS
RBC # BLD AUTO: 3.81 10*6/MM3 (ref 3.77–5.28)
SODIUM SERPL-SCNC: 135 MMOL/L (ref 136–145)
TROPONIN T SERPL HS-MCNC: <6 NG/L
TROPONIN T SERPL HS-MCNC: <6 NG/L
WBC NRBC COR # BLD: 9.59 10*3/MM3 (ref 3.4–10.8)
WHOLE BLOOD HOLD COAG: NORMAL
WHOLE BLOOD HOLD SPECIMEN: NORMAL

## 2023-03-23 PROCEDURE — 84703 CHORIONIC GONADOTROPIN ASSAY: CPT | Performed by: PHYSICIAN ASSISTANT

## 2023-03-23 PROCEDURE — 93010 ELECTROCARDIOGRAM REPORT: CPT | Performed by: INTERNAL MEDICINE

## 2023-03-23 PROCEDURE — 84484 ASSAY OF TROPONIN QUANT: CPT | Performed by: PHYSICIAN ASSISTANT

## 2023-03-23 PROCEDURE — 99283 EMERGENCY DEPT VISIT LOW MDM: CPT

## 2023-03-23 PROCEDURE — 85025 COMPLETE CBC W/AUTO DIFF WBC: CPT | Performed by: PHYSICIAN ASSISTANT

## 2023-03-23 PROCEDURE — 80053 COMPREHEN METABOLIC PANEL: CPT | Performed by: PHYSICIAN ASSISTANT

## 2023-03-23 PROCEDURE — 71045 X-RAY EXAM CHEST 1 VIEW: CPT

## 2023-03-23 PROCEDURE — 83880 ASSAY OF NATRIURETIC PEPTIDE: CPT | Performed by: PHYSICIAN ASSISTANT

## 2023-03-23 PROCEDURE — 93005 ELECTROCARDIOGRAM TRACING: CPT | Performed by: EMERGENCY MEDICINE

## 2023-03-23 PROCEDURE — 93005 ELECTROCARDIOGRAM TRACING: CPT

## 2023-03-23 PROCEDURE — 36415 COLL VENOUS BLD VENIPUNCTURE: CPT

## 2023-03-23 RX ORDER — SODIUM CHLORIDE 0.9 % (FLUSH) 0.9 %
10 SYRINGE (ML) INJECTION AS NEEDED
Status: DISCONTINUED | OUTPATIENT
Start: 2023-03-23 | End: 2023-03-23 | Stop reason: HOSPADM

## 2023-03-23 NOTE — ED NOTES
Pt c/o sternal chest pain that started 3days ago that progressively became worse. Pain radiates into left shoulder starting today. Pain worse with inspiration, palpation and movement. Denies fever, cough. Reports soa.     This RN wore mask and goggles during time of contact

## 2023-03-23 NOTE — ED PROVIDER NOTES
EMERGENCY DEPARTMENT ENCOUNTER    Room Number:  30/30  Date of encounter:  3/23/2023  PCP: Fide Tiwari APRN  Patient Care Team:  Fide Tiwari APRN as PCP - General (Family Medicine)  Nani Stokes, SANDRO as Ambulatory  (Population Health)   Independent Historians: Patient    HPI:  Chief Complaint: Chest pain, shortness of breath  A complete HPI/ROS/PMH/PSH/SH/FH are unobtainable due to: None    Chronic or social conditions impacting patient care (social determinants of health): None    Context: Brittany Sheppard is a 42 y.o. female who presents to the ED c/o chest pain and shortness of breath that began 2 days ago.  She states symptoms have been constant but are worse when she is exerting herself.  She states today she has had discomfort going into her right shoulder/shoulder blade.  She reports history of hypertension.  Denies history of coronary artery disease.    Review of prior external notes (non-ED): Office visit with CHRIS Buenrostro on 2/8/23 for cutaneous abscess of the breast.    Review of prior external test results outside of this encounter: CMP on 2/8/23 show normal renal function. CBC from same date shows hemoglobin 12.7, platelets 241.    PAST MEDICAL HISTORY  Active Ambulatory Problems     Diagnosis Date Noted   • Abscess of axilla, left 06/24/2014   • Lumbago 03/20/2015   • Acute hepatitis C without hepatic coma 06/24/2014   • Anxiety and depression 06/24/2014   • Asthma 03/16/2015   • Atopic contact dermatitis 11/07/2015   • Benign hypertension 04/05/2017   • Breast abscess of female 03/16/2015   • Cervical high risk HPV (human papillomavirus) test positive 12/07/2016   • Chondromalacia of right patella 08/13/2021   • Chronic pain of right knee 08/13/2021   • Gastroesophageal reflux disease 11/07/2019   • Hamstring tightness of right lower extremity 08/13/2021   • Gastric banding status 11/07/2019   • History of smoking 03/16/2015   • HSV-2 seropositive 07/27/2022   • Intractable chronic  migraine without aura and without status migrainosus 07/27/2022   • LGSIL (low grade squamous intraepithelial dysplasia) 01/01/2016   • Morbid obesity ([facility]) 08/03/2015   • Morbid obesity with body mass index of 50.0-59.9 in adult ([facility]) 06/24/2014   • Patellofemoral syndrome of both knees 03/17/2015   • Pes anserinus bursitis of right knee 08/13/2021   • Primary osteoarthritis of both knees 03/17/2015   • Pruritic rash 11/07/2015   • Sepsis ([facility]) 06/24/2014   • Vitamin D deficiency 06/26/2019   • Osteoarthritis of multiple joints 07/27/2022   • Acute cystitis with hematuria 10/21/2022   • Generalized abdominal pain 10/21/2022     Resolved Ambulatory Problems     Diagnosis Date Noted   • Preop cardiovascular exam 10/10/2014   • Acute recurrent frontal sinusitis 09/01/2022     Past Medical History:   Diagnosis Date   • Anemia    • Anxiety    • Arthritis    • Back pain    • Bipolar 1 disorder ([facility])    • COPD (chronic obstructive pulmonary disease) ([facility])    • Depression    • Dyspnea    • Elevated liver enzymes    • GERD (gastroesophageal reflux disease)    • H. pylori infection    • Hepatitis C 03/2015   • Hirsutism    • History of migraine headaches    • HPV in female 04/23/2021   • HPV in female 03/2014   • HPV in female 10/2015   • Hypertension    • IBS (irritable bowel syndrome)    • Insomnia    • LGSIL on Pap smear of cervix 01/2016   • Obesity    • PCOS (polycystic ovarian syndrome)    • PCR positive for herpes simplex virus type 1 (HSV-1) DNA 2018   • Sleep apnea    • Steatosis of liver        The patient has started, but not completed, their COVID-19 vaccination series.    PAST SURGICAL HISTORY  Past Surgical History:   Procedure Laterality Date   • APPENDECTOMY  07/19/2010    [facility] [place]   • BREAST SURGERY      I & D of abcess X3   • D & C HYSTEROSCOPY ENDOMETRIAL ABLATION  12/16/2010    menorrhagia; pathology benign   • LAPAROSCOPIC GASTRIC BANDING  03/27/2017    Dr. Prieto Colon [facility]   • LIVER BIOPSY     •  PILONIDAL CYST / SINUS EXCISION  09/2008   • POLYPECTOMY     • TOOTH EXTRACTION  2016   • WISDOM TOOTH EXTRACTION  2012         FAMILY HISTORY  Family History   Problem Relation Age of Onset   • Diabetes Father    • Hypertension Father    • Migraines Father    • Alcohol abuse Father    • Hypertension Mother    • Obesity Mother    • Stroke Paternal Grandmother    • Breast cancer Paternal Grandmother    • Hypertension Paternal Grandmother    • Pancreatic cancer Paternal Grandmother    • Stroke Maternal Grandmother    • Breast cancer Maternal Grandmother 72   • Diabetes Maternal Grandmother    • Hypertension Maternal Grandmother    • Stroke Maternal Grandfather    • Cancer Maternal Grandfather          SOCIAL HISTORY  Social History     Socioeconomic History   • Marital status:    Tobacco Use   • Smoking status: Every Day   • Smokeless tobacco: Never   Substance and Sexual Activity   • Alcohol use: Yes     Comment: occassionally   • Sexual activity: Yes     Partners: Male, Female         ALLERGIES  Cephalexin, Penicillins, Prunus persica, Cefaclor, Codeine, Morphine, and Latex        REVIEW OF SYSTEMS  Review of Systems   Constitutional: Negative for fever.   HENT: Negative for congestion.    Respiratory: Positive for shortness of breath. Negative for cough.    Cardiovascular: Positive for chest pain. Negative for leg swelling.   Gastrointestinal: Negative for abdominal pain, nausea and vomiting.        All systems reviewed and negative except for those discussed in HPI.       PHYSICAL EXAM    I have reviewed the triage vital signs and nursing notes.    ED Triage Vitals   Temp Heart Rate Resp BP SpO2   03/23/23 1243 03/23/23 1243 03/23/23 1243 03/23/23 1252 03/23/23 1243   98.5 °F (36.9 °C) 100 17 154/93 100 %      Temp src Heart Rate Source Patient Position BP Location FiO2 (%)   -- -- -- -- --              Physical Exam  GENERAL: alert, no acute distress  SKIN: Warm, dry  HENT: Normocephalic, atraumatic  EYES:  no scleral icterus  CV: regular rhythm, regular rate, chest is nontender  RESPIRATORY: normal effort, lungs clear  ABDOMEN: soft, nontender, nondistended  MUSCULOSKELETAL: no deformity  NEURO: alert, moves all extremities, follows commands          LAB RESULTS  Labs Reviewed   COMPREHENSIVE METABOLIC PANEL - Abnormal; Notable for the following components:       Result Value    Glucose 138 (*)     Sodium 135 (*)     All other components within normal limits    Narrative:     GFR Normal >60  Chronic Kidney Disease <60  Kidney Failure <15     CBC WITH AUTO DIFFERENTIAL - Abnormal; Notable for the following components:    .3 (*)     MCH 34.4 (*)     Lymphocytes, Absolute 3.45 (*)     All other components within normal limits   HCG, SERUM, QUALITATIVE - Normal   SINGLE HSTROPONIN T - Normal    Narrative:     High Sensitive Troponin T Reference Range:  <10.0 ng/L- Negative Female for AMI  <15.0 ng/L- Negative Male for AMI  >=10 - Abnormal Female indicating possible myocardial injury.  >=15 - Abnormal Male indicating possible myocardial injury.   Clinicians would have to utilize clinical acumen, EKG, Troponin, and serial changes to determine if it is an Acute Myocardial Infarction or myocardial injury due to an underlying chronic condition.        BNP (IN-HOUSE) - Normal    Narrative:     Among patients with dyspnea, NT-proBNP is highly sensitive for the detection of acute congestive heart failure. In addition NT-proBNP of <300 pg/ml effectively rules out acute congestive heart failure with 99% negative predictive value.    Results may be falsely decreased if patient taking Biotin.     SINGLE HSTROPONIN T - Normal    Narrative:     High Sensitive Troponin T Reference Range:  <10.0 ng/L- Negative Female for AMI  <15.0 ng/L- Negative Male for AMI  >=10 - Abnormal Female indicating possible myocardial injury.  >=15 - Abnormal Male indicating possible myocardial injury.   Clinicians would have to utilize clinical  acumen, EKG, Troponin, and serial changes to determine if it is an Acute Myocardial Infarction or myocardial injury due to an underlying chronic condition.        RAINBOW DRAW    Narrative:     The following orders were created for panel order [place] Draw.  Procedure                               Abnormality         Status                     ---------                               -----------         ------                     Green Top (Gel)[[id number]]                                  Final result               Lavender Top[[id number]]                                     Final result               Gold Top - SST[[id number]]                                                              Light Blue Top[[id number]]                                   Final result                 Please view results for these tests on the individual orders.   GREEN TOP   LAVENDER TOP   LIGHT BLUE TOP   CBC AND DIFFERENTIAL    Narrative:     The following orders were created for panel order CBC & Differential.  Procedure                               Abnormality         Status                     ---------                               -----------         ------                     CBC Auto Differential[[id number]]        Abnormal            Final result                 Please view results for these tests on the individual orders.       Ordered the above labs and independently reviewed and interpreted the results.        RADIOLOGY  XR Chest 1 View   Final Result   No evidence for acute pulmonary process. Follow-up as   clinical indications persist.       This report was finalized on 3/23/2023 1:38 PM by Dr. Linden Yang M.D.              I ordered the above noted radiological studies. Independently reviewed and interpreted by me.  See dictation for official radiology interpretation.      PROCEDURES    Procedures      MEDICATIONS GIVEN IN ER    Medications - No data to display      PROGRESS, DATA ANALYSIS, CONSULTS, AND MEDICAL  DECISION MAKING    All labs have been independently reviewed and interpreted by me.  All radiology studies have been independently reviewed and interpreted by me and discussed with radiologist dictating the report.   EKG's independently reviewed and interpreted by me.  Discussion below represents my analysis of pertinent findings related to patient's condition, differential diagnosis, treatment plan and final disposition.    My differential diagnosis for chest pain includes but is not limited to:  Acute coronary syndrome, stable angina, unstable angina, aortic dissection, cardiac tamponade, coronary artery dissection, esophageal perforation, pulmonary embolism, tension pneumothorax, cocaine-associated chest pain, mediastinitis, cholecystitis, pancreatitis, myocarditis, pericarditis, aortic stenosis, asthma exacerbation, costochondritis, GERD, pneumonia, rib fracture, muscle strain, herpes zoster, pleurisy      ED Course as of 03/24/23 1800   Thu Mar 23, 2023   1342 XR Chest 1 View  Radiology study independently interpreted by me and my findings are no pneumothorax.   [DC]   1342 HEART SCORE    History Slightly or non-suspicious (0)  ECG Normal (0)  Age < or = 45 (0)  Risk factors 1 or 2 (1)  Troponin < or = Normal limit (0)    This patient's HEART score is 1    HEART Score Key:  Scores 0-3: 0.9-1.7% risk of adverse cardiac event. In the HEART Score study, these patients were discharged (0.99% in the retrospective study, 1.7% in the prospective study)  Scores 4-6: 12-16.6% risk of adverse cardiac event. In the HEART Score study, these patients were admitted to the hospital. (11.6% retrospective, 16.6% prospective)  Scores ?7: 50-65% risk of adverse cardiac event. In the HEART Score study, these patients were candidates for early invasive measures. (65.2% retrospective, 50.1% prospective)    [DC]   1348 HS Troponin T: <6  Will obtain 2 hour repeat. If this remains normal, pt will be able to follow up with PCP,  cardiology on outpatient basis. [DC]   1349 proBNP: <5.0 [DC]   1422 Discussed plan for repeat troponin.  Patient denies current symptoms and is agreeable with plan. [DC]   1601 HS Troponin T: <6  Repeat negative [DC]      ED Course User Index  [DC] Marietta Balbuena PA           PPE: Patient was placed in face mask in first look. Patient was wearing facemask when I entered the room and throughout our encounter. I wore full protective equipment throughout this patient encounter including a face mask, and gloves. Hand hygiene was performed before donning protective equipment and after removal when leaving the room.        AS OF 18:00 EDT VITALS:    BP - 142/81  HR - 71  TEMP - 98.5 °F (36.9 °C)  O2 SATS - 96%        DIAGNOSIS  Final diagnoses:   Chest pain, unspecified type         DISPOSITION  ED Disposition     ED Disposition   Discharge    Condition   Stable    Comment   --                Note Disclaimer: At [facility], we believe that sharing information builds trust and better relationships. You are receiving this note because you recently visited [facility]. It is possible you will see health information before a provider has talked with you about it. This kind of information can be easy to misunderstand. To help you fully understand what it means for your health, we urge you to discuss this note with your provider.       Marietta Balbuena PA  03/24/23 1800

## 2023-03-29 ENCOUNTER — LAB (OUTPATIENT)
Dept: LAB | Facility: HOSPITAL | Age: 43
End: 2023-03-29
Payer: COMMERCIAL

## 2023-03-29 ENCOUNTER — OFFICE VISIT (OUTPATIENT)
Dept: INTERNAL MEDICINE | Age: 43
End: 2023-03-29
Payer: COMMERCIAL

## 2023-03-29 VITALS
BODY MASS INDEX: 40.97 KG/M2 | DIASTOLIC BLOOD PRESSURE: 70 MMHG | SYSTOLIC BLOOD PRESSURE: 124 MMHG | OXYGEN SATURATION: 98 % | TEMPERATURE: 98.6 F | HEART RATE: 68 BPM | HEIGHT: 64 IN | WEIGHT: 240 LBS

## 2023-03-29 DIAGNOSIS — F32.A ANXIETY AND DEPRESSION: ICD-10-CM

## 2023-03-29 DIAGNOSIS — R07.9 CHEST PAIN, UNSPECIFIED TYPE: Primary | ICD-10-CM

## 2023-03-29 DIAGNOSIS — R06.09 DYSPNEA ON EXERTION: ICD-10-CM

## 2023-03-29 DIAGNOSIS — R10.13 DYSPEPSIA: ICD-10-CM

## 2023-03-29 DIAGNOSIS — F41.9 ANXIETY AND DEPRESSION: ICD-10-CM

## 2023-03-29 LAB — D DIMER PPP FEU-MCNC: <0.27 MCGFEU/ML (ref 0–0.5)

## 2023-03-29 PROCEDURE — 85379 FIBRIN DEGRADATION QUANT: CPT

## 2023-03-29 PROCEDURE — 36415 COLL VENOUS BLD VENIPUNCTURE: CPT

## 2023-03-29 RX ORDER — PANTOPRAZOLE SODIUM 40 MG/1
40 TABLET, DELAYED RELEASE ORAL DAILY
Qty: 30 TABLET | Refills: 1 | Status: SHIPPED | OUTPATIENT
Start: 2023-03-29

## 2023-03-29 RX ORDER — HYDROXYZINE HYDROCHLORIDE 25 MG/1
25 TABLET, FILM COATED ORAL NIGHTLY PRN
Qty: 30 TABLET | Refills: 0 | Status: SHIPPED | OUTPATIENT
Start: 2023-03-29

## 2023-03-29 NOTE — PROGRESS NOTES
"    I N T E R N A L  M E D I C I N E  Jane Buenrostro, APRN    ENCOUNTER DATE:  03/29/2023    Brittany Sheppard / 42 y.o. / female      CHIEF COMPLAINT / REASON FOR OFFICE VISIT     Hospital Follow Up Visit      ASSESSMENT & PLAN     Diagnoses and all orders for this visit:    1. Chest pain, unspecified type (Primary)  -     D-dimer, Quantitative  -     Treadmill Stress Test; Future    2. Dyspnea on exertion  -     Full Pulmonary Function Test With Bronchodilator; Future    3. Anxiety and depression  -     hydrOXYzine (ATARAX) 25 MG tablet; Take 1 tablet by mouth At Night As Needed for Anxiety.  Dispense: 30 tablet; Refill: 0  -     Ambulatory Referral to Psychiatry  -     Ambulatory Referral to Behavioral Health    4. Dyspepsia  -     pantoprazole (PROTONIX) 40 MG EC tablet; Take 1 tablet by mouth Daily.  Dispense: 30 tablet; Refill: 1         SUMMARY/DISCUSSION  • Obtain D-dimer.  If positive, pt is agreeable to obtain CT PE protocol.  • Will investigate further with stress test, given her family history.  • Encouraged to work towards smoking cessation.  Will investigate possible COPD component with PFT.  • She is agreeable to trail hydroxyzine for anxiety benefit.  She has tried multiple psych meds in the past.  Would like to work with psychiatry to discuss possible long term medication use for anxiety/ depression.  Agreeable to counseling referral.  • Start daily PPI and assess for symptom benefit.  • She is aware to visit the ER for any worsening symptoms.          Next Appointment with me: Visit date not found    Return in about 4 months (around 7/28/2023) for Annual physical.      VITAL SIGNS     Visit Vitals  /70   Pulse 68   Temp 98.6 °F (37 °C)   Ht 162.6 cm (64.02\")   Wt 109 kg (240 lb)   LMP 03/26/2023   SpO2 98%   BMI 41.18 kg/m²             Wt Readings from Last 3 Encounters:   03/29/23 109 kg (240 lb)   03/23/23 122 kg (270 lb)   02/08/23 127 kg (280 lb)     Body mass index is 41.18 " kg/m².        MEDICATIONS AT THE TIME OF OFFICE VISIT     Current Outpatient Medications on File Prior to Visit   Medication Sig Dispense Refill   • hydroCHLOROthiazide (HYDRODIURIL) 25 MG tablet Take 1 tablet by mouth daily. 90 tablet 0   • ibuprofen (ADVIL,MOTRIN) 800 MG tablet Take 1 tablet by mouth Every 8 (Eight) Hours.     • metoprolol tartrate (LOPRESSOR) 50 MG tablet Take 1 tablet by mouth 2 (Two) Times a Day. 180 tablet 0   • multivitamin with minerals tablet tablet Daily.     • valACYclovir (Valtrex) 500 MG tablet Take 1 tablet by mouth 2 (Two) Times a Day. 90 tablet 2     No current facility-administered medications on file prior to visit.        HISTORY OF PRESENT ILLNESS     Pt was seen in the ER on March 23, 2023 for 2 day history of chest pain with shortness of breath.  Discomfort radiates to right shoulder blade.  Symptoms were made worse by exertion and when leaning forward.  Does report tenderness to mid sternal chest wall with palpation.  She continues to experience same symptoms at today's appointment.  Chest XR from ER was unremarkable; CBC, CMP, BNP and serial troponin all non remarkable.  She was discharged home from ER.      Medical history significant for HTN, for which she takes HCTZ 25 mg daily and metoprolol tartrate 50 mg BID.   Family history significant for stroke.  Denies any trauma, injury, or heavy lifting.  No fever, chills.  She denies any personal hx of CAD or blood clots.  Denies recent travel, birth control use, recent COVID-19 infection.   Currently smoking half a pack a day.  She has plans to work towards quitting.     She has noticed her symptoms are exacerbated by stress and anxiety.  She is currently under stress attending court proceedings for divorce.      At one point she was followed by psychiatrist for anxiety/ depression/ possible bipolar disorder.  She states she has tried Elavil, Seroquel, Effexor, Cymbalta and others - all with side effects.  Denies any SI/ HI.       PHQ-9 Depression Score 13: Little interest or pleasure in doing things, feeling down, depressed or hopeless  ALYX-7 Anxiety Score 17: Feeling nervous, anxious and on edge, becoming easily annoyed or irritable    She has had prior gastric banding surgery which has resulted in chronic dyspepsia, nausea symptoms.  She denies any recent worsening of these symptoms.  No prior use of PPI.    Patient Care Team:  Jane Buenrostro APRN as PCP - General (Family Medicine)    REVIEW OF SYSTEMS     Review of Systems   Constitutional: Negative for chills, fever and unexpected weight change.   Respiratory: Positive for shortness of breath (With exertion). Negative for cough and chest tightness.    Cardiovascular: Positive for chest pain. Negative for palpitations and leg swelling.   Gastrointestinal: Positive for nausea (Ongoing, chronic). Negative for abdominal pain.   Neurological: Negative for dizziness, weakness, light-headedness and headaches.   Psychiatric/Behavioral: Positive for dysphoric mood. Negative for self-injury and suicidal ideas. The patient is nervous/anxious.           PHYSICAL EXAMINATION     Physical Exam  Vitals reviewed.   Constitutional:       General: She is not in acute distress.     Appearance: Normal appearance. She is not ill-appearing, toxic-appearing or diaphoretic.   HENT:      Head: Normocephalic and atraumatic.   Cardiovascular:      Rate and Rhythm: Normal rate and regular rhythm.      Heart sounds: Normal heart sounds.   Pulmonary:      Effort: Pulmonary effort is normal.      Breath sounds: Normal breath sounds. No wheezing, rhonchi or rales.   Musculoskeletal:      Right lower leg: No edema.      Left lower leg: No edema.      Comments: Tenderness to sternum with palpation   Skin:     General: Skin is warm and dry.   Neurological:      Mental Status: She is alert and oriented to person, place, and time. Mental status is at baseline.   Psychiatric:         Mood and Affect: Mood normal.          Behavior: Behavior normal.         Thought Content: Thought content normal.         Judgment: Judgment normal.           REVIEWED DATA     Labs:           Imaging:            Medical Tests:           Summary of old records / correspondence / consultant report:           Request outside records:

## 2023-05-01 ENCOUNTER — OFFICE VISIT (OUTPATIENT)
Dept: INTERNAL MEDICINE | Age: 43
End: 2023-05-01
Payer: COMMERCIAL

## 2023-05-01 VITALS
SYSTOLIC BLOOD PRESSURE: 128 MMHG | DIASTOLIC BLOOD PRESSURE: 72 MMHG | HEIGHT: 64 IN | WEIGHT: 276.2 LBS | HEART RATE: 77 BPM | OXYGEN SATURATION: 98 % | TEMPERATURE: 97.7 F | BODY MASS INDEX: 47.15 KG/M2

## 2023-05-01 DIAGNOSIS — R59.0 CERVICAL LYMPHADENOPATHY: Primary | ICD-10-CM

## 2023-05-01 LAB
BASOPHILS # BLD AUTO: 0.02 10*3/MM3 (ref 0–0.2)
BASOPHILS NFR BLD AUTO: 0.3 % (ref 0–1.5)
EOSINOPHIL # BLD AUTO: 0.21 10*3/MM3 (ref 0–0.4)
EOSINOPHIL NFR BLD AUTO: 3.6 % (ref 0.3–6.2)
ERYTHROCYTE [DISTWIDTH] IN BLOOD BY AUTOMATED COUNT: 12.4 % (ref 12.3–15.4)
HCT VFR BLD AUTO: 38.9 % (ref 34–46.6)
HGB BLD-MCNC: 13.2 G/DL (ref 12–15.9)
IMM GRANULOCYTES # BLD AUTO: 0.01 10*3/MM3 (ref 0–0.05)
IMM GRANULOCYTES NFR BLD AUTO: 0.2 % (ref 0–0.5)
LYMPHOCYTES # BLD AUTO: 1.8 10*3/MM3 (ref 0.7–3.1)
LYMPHOCYTES NFR BLD AUTO: 30.9 % (ref 19.6–45.3)
MCH RBC QN AUTO: 34.2 PG (ref 26.6–33)
MCHC RBC AUTO-ENTMCNC: 33.9 G/DL (ref 31.5–35.7)
MCV RBC AUTO: 100.8 FL (ref 79–97)
MONOCYTES # BLD AUTO: 0.45 10*3/MM3 (ref 0.1–0.9)
MONOCYTES NFR BLD AUTO: 7.7 % (ref 5–12)
NEUTROPHILS # BLD AUTO: 3.33 10*3/MM3 (ref 1.7–7)
NEUTROPHILS NFR BLD AUTO: 57.3 % (ref 42.7–76)
NRBC BLD AUTO-RTO: 0 /100 WBC (ref 0–0.2)
PLATELET # BLD AUTO: 321 10*3/MM3 (ref 140–450)
RBC # BLD AUTO: 3.86 10*6/MM3 (ref 3.77–5.28)
WBC # BLD AUTO: 5.82 10*3/MM3 (ref 3.4–10.8)

## 2023-05-01 NOTE — PROGRESS NOTES
"    I N T E R N A L  M E D I C I N E  Jane Buenrostro, APRN    ENCOUNTER DATE:  05/01/2023    Brittany Sheppard / 42 y.o. / female      CHIEF COMPLAINT / REASON FOR OFFICE VISIT     Neck Pain (Lump on left side of neck, slightly swollen tender to touch, noticed it on last friday)      ASSESSMENT & PLAN     Diagnoses and all orders for this visit:    1. Cervical lymphadenopathy (Primary)  -     CBC & Differential  -     US Head Neck Soft Tissue; Future         SUMMARY/DISCUSSION  • She is agreeable for CBC, ultrasound and to update with dentist.  Recommend daily zyrtec to treat allergic rhinitis.  • She is aware to monitor for fever, chills, increased pain, swelling, erythema, dyspnea and seek immediate follow up care.        Next Appointment with me: 8/16/2023    Return for Change August 2023 appointment to establish care.      VITAL SIGNS     Visit Vitals  /72 (BP Location: Right arm, Patient Position: Sitting, Cuff Size: Adult)   Pulse 77   Temp 97.7 °F (36.5 °C) (Temporal)   Ht 162.6 cm (64.02\")   Wt 125 kg (276 lb 3.2 oz)   LMP 04/23/2023 (Exact Date)   SpO2 98%   BMI 47.38 kg/m²             Wt Readings from Last 3 Encounters:   05/01/23 125 kg (276 lb 3.2 oz)   03/29/23 109 kg (240 lb)   03/23/23 122 kg (270 lb)     Body mass index is 47.38 kg/m².        MEDICATIONS AT THE TIME OF OFFICE VISIT     Current Outpatient Medications on File Prior to Visit   Medication Sig Dispense Refill   • hydroCHLOROthiazide (HYDRODIURIL) 25 MG tablet Take 1 tablet by mouth daily. 90 tablet 0   • hydrOXYzine (ATARAX) 25 MG tablet Take 1 tablet by mouth At Night As Needed for Anxiety. 30 tablet 0   • ibuprofen (ADVIL,MOTRIN) 800 MG tablet Take 1 tablet by mouth Every 8 (Eight) Hours.     • metoprolol tartrate (LOPRESSOR) 50 MG tablet Take 1 tablet by mouth 2 (Two) Times a Day. 180 tablet 0   • multivitamin with minerals tablet tablet Daily.     • pantoprazole (PROTONIX) 40 MG EC tablet Take 1 tablet by mouth Daily. 30 tablet 1   • " valACYclovir (Valtrex) 500 MG tablet Take 1 tablet by mouth 2 (Two) Times a Day. 90 tablet 2     No current facility-administered medications on file prior to visit.        HISTORY OF PRESENT ILLNESS     She reports a 3 day history of a new, mildly tender, swollen left superficial cervical lymph node.  Denies any recent URI, but does have a history of allergic rhinitis.  Her allergy symptoms this spring have been significant.  Denies fever, chills, unintentional weight loss.  She reports ongoing night sweats for the last 5-10 years.  Also has a history of swelling of left postauricular node x several years.   She notes this node seems slightly enlarged from baseline at today's appointment and is also mildly tender.  She reports possible new swelling of right superficial cervical node as well.  No other lymphadenopathy in axilla or groin.  No history of cat scratches.  No erythema, drainage, dyspnea.   She is a current smoker.        Patient Care Team:  Jane Buenrostro APRN as PCP - General (Family Medicine)    REVIEW OF SYSTEMS     Review of Systems   Constitutional: Negative for chills, fatigue, fever and unexpected weight change.   HENT: Positive for rhinorrhea. Negative for ear pain, sinus pressure, sinus pain, sore throat and trouble swallowing.    Eyes: Positive for itching.   Respiratory: Negative for cough, chest tightness and shortness of breath.    Cardiovascular: Negative for chest pain, palpitations and leg swelling.   Neurological: Negative for dizziness, weakness, light-headedness and headaches.   Hematological: Positive for adenopathy.   Psychiatric/Behavioral: The patient is not nervous/anxious.           PHYSICAL EXAMINATION     Physical Exam  Vitals reviewed.   Constitutional:       General: She is not in acute distress.     Appearance: Normal appearance. She is not ill-appearing, toxic-appearing or diaphoretic.   HENT:      Head: Normocephalic and atraumatic.      Right Ear: Tympanic membrane, ear  canal and external ear normal. There is no impacted cerumen.      Left Ear: Tympanic membrane, ear canal and external ear normal. There is no impacted cerumen.      Nose: Nose normal. No congestion or rhinorrhea.      Mouth/Throat:      Mouth: Mucous membranes are moist.      Pharynx: Oropharynx is clear. No oropharyngeal exudate or posterior oropharyngeal erythema.   Cardiovascular:      Rate and Rhythm: Normal rate and regular rhythm.      Heart sounds: Normal heart sounds.   Pulmonary:      Effort: Pulmonary effort is normal.      Breath sounds: Normal breath sounds.   Lymphadenopathy:      Cervical: Cervical adenopathy (Round, moveable, soft left superficial cervical and postauricular nodes; no palpable right node swelling) present.   Neurological:      Mental Status: She is alert and oriented to person, place, and time. Mental status is at baseline.   Psychiatric:         Mood and Affect: Mood normal.         Behavior: Behavior normal.         Thought Content: Thought content normal.         Judgment: Judgment normal.           REVIEWED DATA     Labs:           Imaging:            Medical Tests:           Summary of old records / correspondence / consultant report:           Request outside records:

## 2023-05-03 ENCOUNTER — HOSPITAL ENCOUNTER (OUTPATIENT)
Dept: RESPIRATORY THERAPY | Facility: HOSPITAL | Age: 43
Discharge: HOME | End: 2023-05-03
Payer: COMMERCIAL

## 2023-05-03 ENCOUNTER — HOSPITAL ENCOUNTER (OUTPATIENT)
Dept: ULTRASOUND IMAGING | Facility: HOSPITAL | Age: 43
Discharge: HOME | End: 2023-05-03
Payer: COMMERCIAL

## 2023-05-03 DIAGNOSIS — N63.20 MASS OF LEFT BREAST, UNSPECIFIED QUADRANT: ICD-10-CM

## 2023-05-03 DIAGNOSIS — R59.0 CERVICAL LYMPHADENOPATHY: ICD-10-CM

## 2023-05-03 DIAGNOSIS — R06.09 DYSPNEA ON EXERTION: ICD-10-CM

## 2023-05-03 PROCEDURE — 94060 EVALUATION OF WHEEZING: CPT

## 2023-05-03 PROCEDURE — 76536 US EXAM OF HEAD AND NECK: CPT

## 2023-05-03 PROCEDURE — 94726 PLETHYSMOGRAPHY LUNG VOLUMES: CPT

## 2023-05-03 PROCEDURE — 94640 AIRWAY INHALATION TREATMENT: CPT

## 2023-05-03 PROCEDURE — 76642 ULTRASOUND BREAST LIMITED: CPT

## 2023-05-03 RX ORDER — ALBUTEROL SULFATE 2.5 MG/3ML
2.5 SOLUTION RESPIRATORY (INHALATION) ONCE
Status: COMPLETED | OUTPATIENT
Start: 2023-05-03 | End: 2023-05-03

## 2023-05-03 RX ADMIN — ALBUTEROL SULFATE 2.5 MG: 2.5 SOLUTION RESPIRATORY (INHALATION) at 09:45

## 2023-05-04 DIAGNOSIS — Z72.0 NICOTINE ABUSE: Primary | ICD-10-CM

## 2023-05-04 RX ORDER — NICOTINE 21 MG/24HR
1 PATCH, TRANSDERMAL 24 HOURS TRANSDERMAL EVERY 24 HOURS
Qty: 28 EACH | Refills: 0 | Status: SHIPPED | OUTPATIENT
Start: 2023-05-04

## 2023-05-10 ENCOUNTER — HOSPITAL ENCOUNTER (OUTPATIENT)
Dept: CARDIOLOGY | Facility: HOSPITAL | Age: 43
Discharge: HOME | End: 2023-05-10
Payer: COMMERCIAL

## 2023-05-10 DIAGNOSIS — R07.9 CHEST PAIN, UNSPECIFIED TYPE: ICD-10-CM

## 2023-05-10 PROCEDURE — 93017 CV STRESS TEST TRACING ONLY: CPT

## 2023-05-10 RX ORDER — VALACYCLOVIR HYDROCHLORIDE 500 MG/1
500 TABLET, FILM COATED ORAL 2 TIMES DAILY
Qty: 90 TABLET | Refills: 2 | Status: SHIPPED | OUTPATIENT
Start: 2023-05-10

## 2023-05-14 LAB
BH CV STRESS BP STAGE 1: NORMAL
BH CV STRESS BP STAGE 2: NORMAL
BH CV STRESS DURATION MIN STAGE 1: 3
BH CV STRESS DURATION MIN STAGE 2: 3
BH CV STRESS DURATION SEC STAGE 1: 0
BH CV STRESS DURATION SEC STAGE 2: 17
BH CV STRESS GRADE STAGE 1: 10
BH CV STRESS GRADE STAGE 2: 12
BH CV STRESS HR STAGE 1: 130
BH CV STRESS HR STAGE 2: 154
BH CV STRESS METS STAGE 1: 5
BH CV STRESS METS STAGE 2: 7.5
BH CV STRESS PROTOCOL 1: NORMAL
BH CV STRESS RECOVERY BP: NORMAL MMHG
BH CV STRESS RECOVERY HR: 106 BPM
BH CV STRESS SPEED STAGE 1: 1.7
BH CV STRESS SPEED STAGE 2: 2.5
BH CV STRESS STAGE 1: 1
BH CV STRESS STAGE 2: 2
MAXIMAL PREDICTED HEART RATE: 180 BPM
PERCENT MAX PREDICTED HR: 85.56 %
STRESS BASELINE BP: NORMAL MMHG
STRESS BASELINE HR: 92 BPM
STRESS PERCENT HR: 101 %
STRESS POST ESTIMATED WORKLOAD: 7.1 METS
STRESS POST EXERCISE DUR MIN: 3 MIN
STRESS POST EXERCISE DUR SEC: 17 SEC
STRESS POST PEAK BP: NORMAL MMHG
STRESS POST PEAK HR: 154 BPM
STRESS TARGET HR: 153 BPM

## 2023-05-23 ENCOUNTER — TELEPHONE (OUTPATIENT)
Dept: OBSTETRICS AND GYNECOLOGY | Facility: CLINIC | Age: 43
End: 2023-05-23
Payer: COMMERCIAL

## 2023-05-23 NOTE — TELEPHONE ENCOUNTER
Pt states she had appt with you October 2022 and was supposed to eventually schedule LEEP. She is wanting to know at this point, should be still schedule for LEEP or a pap.     Please advise,   Thank you

## 2023-05-23 NOTE — TELEPHONE ENCOUNTER
Pt says she is not doing another colpo, pt states it was too painful. Would you recommend she schedule for routine pap?

## 2023-08-22 ENCOUNTER — TELEPHONE (OUTPATIENT)
Dept: INTERNAL MEDICINE | Age: 43
End: 2023-08-22
Payer: COMMERCIAL

## 2023-08-22 NOTE — TELEPHONE ENCOUNTER
CALLED PT AND GOT HER RESCHEDULED.  HUB SCHEDULED HER 15 MINUTE FOR A PHYSICAL INSTEAD OF A  30 MINUTE APPOINTMENT.  PATIENT WOULD STILL LIKE TO COME IN AND HAVE HER LABS DONE BECAUSE IT'S BEEN A WHILE AND SHE NEEDS THEM DONE.  I DON'T SEE ANY CURRENT LABS IN THERE AND PATIENT STATED HER AND WILIAN DISCUSSED LABS ON HER LAST OFFICE VISIT.  PLEASE ADVISE

## 2023-08-23 ENCOUNTER — OFFICE VISIT (OUTPATIENT)
Dept: INTERNAL MEDICINE | Age: 43
End: 2023-08-23
Payer: COMMERCIAL

## 2023-08-23 VITALS
HEIGHT: 64 IN | HEART RATE: 74 BPM | BODY MASS INDEX: 46.1 KG/M2 | SYSTOLIC BLOOD PRESSURE: 120 MMHG | TEMPERATURE: 97.1 F | DIASTOLIC BLOOD PRESSURE: 80 MMHG | WEIGHT: 270 LBS | OXYGEN SATURATION: 100 %

## 2023-08-23 DIAGNOSIS — F31.32 BIPOLAR 1 DISORDER, DEPRESSED, MODERATE: Primary | ICD-10-CM

## 2023-08-23 DIAGNOSIS — Z00.00 ANNUAL PHYSICAL EXAM: ICD-10-CM

## 2023-08-23 DIAGNOSIS — I10 BENIGN HYPERTENSION: ICD-10-CM

## 2023-08-23 RX ORDER — BUPROPION HYDROCHLORIDE 150 MG/1
150 TABLET ORAL DAILY
Qty: 30 TABLET | Refills: 0 | Status: SHIPPED | OUTPATIENT
Start: 2023-08-23

## 2023-08-23 RX ORDER — ARIPIPRAZOLE 2 MG/1
2 TABLET ORAL DAILY
Qty: 30 TABLET | Refills: 0 | Status: SHIPPED | OUTPATIENT
Start: 2023-08-23

## 2023-08-23 NOTE — PROGRESS NOTES
"    I N T E R N A L  M E D I C I N E  Jane Buenrostro, APRN    ENCOUNTER DATE:  08/23/2023    Brittany Sheppard / 43 y.o. / female      CHIEF COMPLAINT / REASON FOR OFFICE VISIT     Anxiety and Depression      ASSESSMENT & PLAN     Diagnoses and all orders for this visit:    1. Bipolar 1 disorder, depressed, moderate (Primary)  -     buPROPion XL (Wellbutrin XL) 150 MG 24 hr tablet; Take 1 tablet by mouth Daily.  Dispense: 30 tablet; Refill: 0  -     ARIPiprazole (Abilify) 2 MG tablet; Take 1 tablet by mouth Daily.  Dispense: 30 tablet; Refill: 0    2. Benign hypertension  -     CBC & Differential  -     Comprehensive Metabolic Panel    3. Annual physical exam  -     Hemoglobin A1c  -     Lipid Panel With / Chol / HDL Ratio  -     TSH+Free T4  -     Urinalysis With Microscopic If Indicated (No Culture) - Urine, Clean Catch         SUMMARY/DISCUSSION  Will start Wellbutrin given patient's current mental status of depression, and add low dose Abilify as mood stabilizer. Discussed risks/ side effects of atypical psychiatric medications with patient.  Discussed [facility] as mental health resource.  Recommend that she advise those close to her that she is starting on new mental health medications to monitor closely for any personality changes or episodes of rosamaria.  Pt aware to visit the ER for any SI/ HI.  Encouraged to establish with psychiatry.  Encouraged to utilize [facility] work service for counseling.  Close follow up in 1 month.      Next Appointment with me: 12/29/2023    Return for Recheck 1 month; please move patient's annual physical sooner, per her request (30 mins).      VITAL SIGNS     Visit Vitals  /80   Pulse 74   Temp 97.1 øF (36.2 øC)   Ht 162.6 cm (64.02\")   Wt 122 kg (270 lb)   LMP 08/04/2023 (Exact Date)   SpO2 100%   BMI 46.32 kg/mý             Wt Readings from Last 3 Encounters:   08/23/23 122 kg (270 lb)   05/01/23 125 kg (276 lb 3.2 oz)   03/29/23 109 kg (240 lb)     Body mass index is 46.32 " kg/mý.        MEDICATIONS AT THE TIME OF OFFICE VISIT     Current Outpatient Medications on File Prior to Visit   Medication Sig Dispense Refill    hydroCHLOROthiazide (HYDRODIURIL) 25 MG tablet Take 1 tablet by mouth daily. 90 tablet 1    metoprolol tartrate (LOPRESSOR) 50 MG tablet Take 1 tablet by mouth 2 (Two) Times a Day. 180 tablet 0    multivitamin with minerals tablet tablet Daily.      nicotine (Nicoderm CQ) 14 MG/24HR patch Place 1 patch on the skin as directed by provider Daily. 28 each 0    pantoprazole (PROTONIX) 40 MG EC tablet Take 1 tablet by mouth Daily. 30 tablet 1    valACYclovir (Valtrex) 500 MG tablet Take 1 tablet by mouth 2 (Two) Times a Day. 90 tablet 2    hydrOXYzine (ATARAX) 25 MG tablet Take 1 tablet by mouth At Night As Needed for Anxiety. (Patient not taking: Reported on 8/23/2023) 30 tablet 0    [DISCONTINUED] ibuprofen (ADVIL,MOTRIN) 800 MG tablet Take 1 tablet by mouth Every 8 (Eight) Hours. (Patient not taking: Reported on 8/23/2023)       No current facility-administered medications on file prior to visit.        HISTORY OF PRESENT ILLNESS     Here today for ongoing anxiety/ depression symptoms following her divorce.  She reports frequent irritability and fluctuating mood with episodes consistent with hypomania.  Current state of mind is down, depressed.  Medical history is significant for anxiety, depression, bipolar disorder 1.  Family medical history significant for bipolar disorder in dad.  She has tried Elavil, Seroquel, Effexor, Cymbalta and others - all with side effects, mostly diaphoresis.  She reports she has taken citalopram at one point in the past with some benefit, but denies any prior use of a mood stabilizer.  Denies any SI/ HI.  She is using hydroxyzine at night with sleep onset benefit.         Patient Care Team:  Jane Buenrostro APRN as PCP - General (Family Medicine)    REVIEW OF SYSTEMS     Review of Systems   Constitutional:  Negative for chills, fever and  unexpected weight change.   Respiratory:  Negative for cough, chest tightness and shortness of breath.    Cardiovascular:  Negative for chest pain, palpitations and leg swelling.   Neurological:  Negative for dizziness, weakness, light-headedness and headaches.   Psychiatric/Behavioral:  Positive for dysphoric mood. Negative for self-injury, sleep disturbance and suicidal ideas. The patient is nervous/anxious.         PHYSICAL EXAMINATION     Physical Exam  Vitals reviewed.   Constitutional:       General: She is not in acute distress.     Appearance: Normal appearance. She is not ill-appearing, toxic-appearing or diaphoretic.   HENT:      Head: Normocephalic and atraumatic.   Cardiovascular:      Rate and Rhythm: Normal rate and regular rhythm.      Heart sounds: Normal heart sounds.   Pulmonary:      Effort: Pulmonary effort is normal.      Breath sounds: Normal breath sounds.   Neurological:      Mental Status: She is alert and oriented to person, place, and time. Mental status is at baseline.   Psychiatric:         Mood and Affect: Mood is depressed.         Behavior: Behavior normal.         Thought Content: Thought content normal.         Judgment: Judgment normal.         REVIEWED DATA     Labs:           Imaging:            Medical Tests:           Summary of old records / correspondence / consultant report:           Request outside records:

## 2023-08-24 DIAGNOSIS — R94.6 ABNORMAL THYROID FUNCTION TEST: Primary | ICD-10-CM

## 2023-08-24 LAB
ALBUMIN SERPL-MCNC: 4.2 G/DL (ref 3.5–5.2)
ALBUMIN/GLOB SERPL: 1.7 G/DL
ALP SERPL-CCNC: 52 U/L (ref 39–117)
ALT SERPL-CCNC: 14 U/L (ref 1–33)
APPEARANCE UR: CLEAR
AST SERPL-CCNC: 15 U/L (ref 1–32)
BASOPHILS # BLD AUTO: 0.03 10*3/MM3 (ref 0–0.2)
BASOPHILS NFR BLD AUTO: 0.4 % (ref 0–1.5)
BILIRUB SERPL-MCNC: 0.5 MG/DL (ref 0–1.2)
BILIRUB UR QL STRIP: NEGATIVE
BUN SERPL-MCNC: 13 MG/DL (ref 6–20)
BUN/CREAT SERPL: 14.4 (ref 7–25)
CALCIUM SERPL-MCNC: 9.5 MG/DL (ref 8.6–10.5)
CHLORIDE SERPL-SCNC: 102 MMOL/L (ref 98–107)
CHOLEST SERPL-MCNC: 165 MG/DL (ref 0–200)
CHOLEST/HDLC SERPL: 2.8 {RATIO}
CO2 SERPL-SCNC: 25.8 MMOL/L (ref 22–29)
COLOR UR: YELLOW
CREAT SERPL-MCNC: 0.9 MG/DL (ref 0.57–1)
EGFRCR SERPLBLD CKD-EPI 2021: 82.5 ML/MIN/1.73
EOSINOPHIL # BLD AUTO: 0.07 10*3/MM3 (ref 0–0.4)
EOSINOPHIL NFR BLD AUTO: 0.9 % (ref 0.3–6.2)
ERYTHROCYTE [DISTWIDTH] IN BLOOD BY AUTOMATED COUNT: 12.4 % (ref 12.3–15.4)
GLOBULIN SER CALC-MCNC: 2.5 GM/DL
GLUCOSE SERPL-MCNC: 92 MG/DL (ref 65–99)
GLUCOSE UR QL STRIP: NEGATIVE
HBA1C MFR BLD: 5.4 % (ref 4.8–5.6)
HCT VFR BLD AUTO: 37.7 % (ref 34–46.6)
HDLC SERPL-MCNC: 59 MG/DL (ref 40–60)
HGB BLD-MCNC: 13.2 G/DL (ref 12–15.9)
HGB UR QL STRIP: NEGATIVE
IMM GRANULOCYTES # BLD AUTO: 0.01 10*3/MM3 (ref 0–0.05)
IMM GRANULOCYTES NFR BLD AUTO: 0.1 % (ref 0–0.5)
KETONES UR QL STRIP: NEGATIVE
LDLC SERPL CALC-MCNC: 94 MG/DL (ref 0–100)
LEUKOCYTE ESTERASE UR QL STRIP: NEGATIVE
LYMPHOCYTES # BLD AUTO: 2.74 10*3/MM3 (ref 0.7–3.1)
LYMPHOCYTES NFR BLD AUTO: 36.7 % (ref 19.6–45.3)
MCH RBC QN AUTO: 35 PG (ref 26.6–33)
MCHC RBC AUTO-ENTMCNC: 35 G/DL (ref 31.5–35.7)
MCV RBC AUTO: 100 FL (ref 79–97)
MONOCYTES # BLD AUTO: 0.63 10*3/MM3 (ref 0.1–0.9)
MONOCYTES NFR BLD AUTO: 8.4 % (ref 5–12)
NEUTROPHILS # BLD AUTO: 3.98 10*3/MM3 (ref 1.7–7)
NEUTROPHILS NFR BLD AUTO: 53.5 % (ref 42.7–76)
NITRITE UR QL STRIP: NEGATIVE
NRBC BLD AUTO-RTO: 0.1 /100 WBC (ref 0–0.2)
PH UR STRIP: 6.5 [PH] (ref 5–8)
PLATELET # BLD AUTO: 274 10*3/MM3 (ref 140–450)
POTASSIUM SERPL-SCNC: 4 MMOL/L (ref 3.5–5.2)
PROT SERPL-MCNC: 6.7 G/DL (ref 6–8.5)
PROT UR QL STRIP: NEGATIVE
RBC # BLD AUTO: 3.77 10*6/MM3 (ref 3.77–5.28)
SODIUM SERPL-SCNC: 136 MMOL/L (ref 136–145)
SP GR UR STRIP: 1.02 (ref 1–1.03)
T4 FREE SERPL-MCNC: 0.9 NG/DL (ref 0.93–1.7)
TRIGL SERPL-MCNC: 60 MG/DL (ref 0–150)
TSH SERPL DL<=0.005 MIU/L-ACNC: 1 UIU/ML (ref 0.27–4.2)
UROBILINOGEN UR STRIP-MCNC: NORMAL MG/DL
VLDLC SERPL CALC-MCNC: 12 MG/DL (ref 5–40)
WBC # BLD AUTO: 7.46 10*3/MM3 (ref 3.4–10.8)

## 2023-09-21 ENCOUNTER — TELEPHONE (OUTPATIENT)
Dept: INTERNAL MEDICINE | Age: 43
End: 2023-09-21

## 2023-09-22 ENCOUNTER — OFFICE VISIT (OUTPATIENT)
Dept: INTERNAL MEDICINE | Age: 43
End: 2023-09-22
Payer: COMMERCIAL

## 2023-09-22 VITALS
OXYGEN SATURATION: 98 % | BODY MASS INDEX: 46.44 KG/M2 | HEART RATE: 78 BPM | SYSTOLIC BLOOD PRESSURE: 132 MMHG | HEIGHT: 64 IN | WEIGHT: 272 LBS | DIASTOLIC BLOOD PRESSURE: 82 MMHG | TEMPERATURE: 96.9 F

## 2023-09-22 DIAGNOSIS — F31.32 BIPOLAR 1 DISORDER, DEPRESSED, MODERATE: Primary | ICD-10-CM

## 2023-09-22 DIAGNOSIS — I10 BENIGN HYPERTENSION: ICD-10-CM

## 2023-09-22 PROCEDURE — 99214 OFFICE O/P EST MOD 30 MIN: CPT

## 2023-09-22 RX ORDER — BUPROPION HYDROCHLORIDE 300 MG/1
300 TABLET ORAL DAILY
Qty: 30 TABLET | Refills: 0 | Status: SHIPPED | OUTPATIENT
Start: 2023-09-22

## 2023-09-22 NOTE — PROGRESS NOTES
"    I N T E R N A L  M E D I C I N E  Jane Buenrostro, CHRIS    ENCOUNTER DATE:  09/22/2023    Brittany Sheppard / 43 y.o. / female      CHIEF COMPLAINT / REASON FOR OFFICE VISIT     Hypertension, Depression, and Anxiety      ASSESSMENT & PLAN     Diagnoses and all orders for this visit:    1. Bipolar 1 disorder, depressed, moderate (Primary)  -     buPROPion XL (Wellbutrin XL) 300 MG 24 hr tablet; Take 1 tablet by mouth Daily.  Dispense: 30 tablet; Refill: 0    2. Benign hypertension  Overview:  Continue HCTZ 25 mg daily, metoprolol tartrate 50 mg BID.           SUMMARY/DISCUSSION  She is tolerating Wellbutrin well and has had mild improvement in depression/ anxiety symptoms, along with improved sleep.  Will increase dose from 150 mg to 300 mg daily.  Will STOP Abilify as she was unable to tolerate due to side effects.  She will follow up closely with psychiatry appointment scheduled next week.  She is aware to visit the ER for any SI/ HI.  She was encouraged to establish with counselor.  Close re assessment at follow up appointment on October 12, 2023.       Next Appointment with me: 10/12/2023    Return for Next scheduled follow up.      VITAL SIGNS     Visit Vitals  /82   Pulse 78   Temp 96.9 °F (36.1 °C)   Ht 162.6 cm (64.02\")   Wt 123 kg (272 lb)   LMP 08/29/2023 (Exact Date)   SpO2 98%   BMI 46.67 kg/m²             Wt Readings from Last 3 Encounters:   09/22/23 123 kg (272 lb)   08/23/23 122 kg (270 lb)   05/01/23 125 kg (276 lb 3.2 oz)     Body mass index is 46.67 kg/m².        MEDICATIONS AT THE TIME OF OFFICE VISIT     Current Outpatient Medications on File Prior to Visit   Medication Sig Dispense Refill    hydroCHLOROthiazide (HYDRODIURIL) 25 MG tablet Take 1 tablet by mouth daily. 90 tablet 1    metoprolol tartrate (LOPRESSOR) 50 MG tablet Take 1 tablet by mouth 2 (Two) Times a Day. 180 tablet 0    multivitamin with minerals tablet tablet Daily.      nicotine (Nicoderm CQ) 14 MG/24HR patch Place 1 patch on " the skin as directed by provider Daily. 28 each 0    pantoprazole (PROTONIX) 40 MG EC tablet Take 1 tablet by mouth Daily. 30 tablet 1    valACYclovir (Valtrex) 500 MG tablet Take 1 tablet by mouth 2 (Two) Times a Day. 90 tablet 2    [DISCONTINUED] buPROPion XL (Wellbutrin XL) 150 MG 24 hr tablet Take 1 tablet by mouth Daily. 30 tablet 0    [DISCONTINUED] hydrOXYzine (ATARAX) 25 MG tablet Take 1 tablet by mouth At Night As Needed for Anxiety. 30 tablet 0    ARIPiprazole (Abilify) 2 MG tablet Take 1 tablet by mouth Daily. (Patient not taking: Reported on 9/22/2023) 30 tablet 0     No current facility-administered medications on file prior to visit.        HISTORY OF PRESENT ILLNESS     Here today as 1 month follow up after starting Wellbutrin  mg daily and Abilify 2 mg daily for depression with bipolar 1 disorder.  She reports she discontinued the Abilify by her own decision because this medication gave her insomnia.  She has been off of Abilify for one week, and sleep has improved.  She has been taking Wellbutrin daily with improvement in dysphoric symptoms and reduced anxiety/ worrying thoughts.  Continue to deny any SI/ HI.  She will be establishing with [facility] psychiatry next week.  She has not yet established with counselor.  Wellbutrin is also helping with smoking cessation; she reports 1 pack is lasting her approximately 4-5 days.    HTN: Today's BP is well controlled, 132/82, on HCTZ 25 mg daily, metoprolol tartrate 50 mg BID. No chest pain, shortness of breath, palpitations.      Patient Care Team:  Jane Buenrostro APRN as PCP - General (Family Medicine)    REVIEW OF SYSTEMS     Review of Systems   Constitutional:  Negative for chills, fever and unexpected weight change.   Respiratory:  Negative for cough, chest tightness and shortness of breath.    Cardiovascular:  Negative for chest pain, palpitations and leg swelling.   Neurological:  Negative for dizziness, weakness,  light-headedness and headaches.   Psychiatric/Behavioral:  Positive for dysphoric mood. Negative for self-injury, sleep disturbance and suicidal ideas. The patient is nervous/anxious.         PHYSICAL EXAMINATION     Physical Exam  Vitals reviewed.   Constitutional:       General: She is not in acute distress.     Appearance: Normal appearance. She is not ill-appearing, toxic-appearing or diaphoretic.   HENT:      Head: Normocephalic and atraumatic.   Cardiovascular:      Rate and Rhythm: Normal rate and regular rhythm.      Heart sounds: Normal heart sounds.   Pulmonary:      Effort: Pulmonary effort is normal.      Breath sounds: Normal breath sounds.   Neurological:      Mental Status: She is alert and oriented to person, place, and time. Mental status is at baseline.   Psychiatric:         Mood and Affect: Mood normal.         Behavior: Behavior normal.         Thought Content: Thought content normal.         Judgment: Judgment normal.         REVIEWED DATA     Labs:           Imaging:            Medical Tests:           Summary of old records / correspondence / consultant report:           Request outside records:

## 2023-09-27 RX ORDER — VALACYCLOVIR HYDROCHLORIDE 500 MG/1
500 TABLET, FILM COATED ORAL 2 TIMES DAILY
Qty: 90 TABLET | Refills: 2 | Status: SHIPPED | OUTPATIENT
Start: 2023-09-27

## 2023-10-12 ENCOUNTER — OFFICE VISIT (OUTPATIENT)
Dept: OBSTETRICS AND GYNECOLOGY | Facility: CLINIC | Age: 43
End: 2023-10-12
Payer: COMMERCIAL

## 2023-10-12 ENCOUNTER — OFFICE VISIT (OUTPATIENT)
Dept: INTERNAL MEDICINE | Age: 43
End: 2023-10-12
Payer: COMMERCIAL

## 2023-10-12 VITALS
BODY MASS INDEX: 46.26 KG/M2 | HEIGHT: 64 IN | OXYGEN SATURATION: 100 % | DIASTOLIC BLOOD PRESSURE: 90 MMHG | WEIGHT: 271 LBS | TEMPERATURE: 97.6 F | HEART RATE: 73 BPM | SYSTOLIC BLOOD PRESSURE: 128 MMHG

## 2023-10-12 VITALS
HEIGHT: 64 IN | SYSTOLIC BLOOD PRESSURE: 116 MMHG | WEIGHT: 270 LBS | BODY MASS INDEX: 46.1 KG/M2 | DIASTOLIC BLOOD PRESSURE: 80 MMHG

## 2023-10-12 DIAGNOSIS — Z12.4 CERVICAL CANCER SCREENING: ICD-10-CM

## 2023-10-12 DIAGNOSIS — Z98.84 PERSONAL HISTORY OF GASTRIC BANDING: ICD-10-CM

## 2023-10-12 DIAGNOSIS — G47.9 SLEEP DISTURBANCE: ICD-10-CM

## 2023-10-12 DIAGNOSIS — Z01.419 WELL WOMAN EXAM WITH ROUTINE GYNECOLOGICAL EXAM: Primary | ICD-10-CM

## 2023-10-12 DIAGNOSIS — Z11.3 SCREENING EXAMINATION FOR STD (SEXUALLY TRANSMITTED DISEASE): ICD-10-CM

## 2023-10-12 DIAGNOSIS — D75.89 MACROCYTOSIS: ICD-10-CM

## 2023-10-12 DIAGNOSIS — G47.33 OSA (OBSTRUCTIVE SLEEP APNEA): ICD-10-CM

## 2023-10-12 DIAGNOSIS — Z12.31 BREAST CANCER SCREENING BY MAMMOGRAM: ICD-10-CM

## 2023-10-12 DIAGNOSIS — Z00.00 ANNUAL PHYSICAL EXAM: Primary | ICD-10-CM

## 2023-10-12 DIAGNOSIS — E66.01 CLASS 3 SEVERE OBESITY DUE TO EXCESS CALORIES WITH SERIOUS COMORBIDITY AND BODY MASS INDEX (BMI) OF 45.0 TO 49.9 IN ADULT: ICD-10-CM

## 2023-10-12 DIAGNOSIS — F31.32 BIPOLAR 1 DISORDER, DEPRESSED, MODERATE: ICD-10-CM

## 2023-10-12 DIAGNOSIS — Z59.819 HOUSING INSECURITY: ICD-10-CM

## 2023-10-12 DIAGNOSIS — Z87.410 HISTORY OF CERVICAL DYSPLASIA: ICD-10-CM

## 2023-10-12 RX ORDER — SEMAGLUTIDE 0.25 MG/.5ML
0.25 INJECTION, SOLUTION SUBCUTANEOUS WEEKLY
Qty: 2 ML | Refills: 0 | Status: SHIPPED | OUTPATIENT
Start: 2023-10-12 | End: 2023-11-03

## 2023-10-12 SDOH — ECONOMIC STABILITY - HOUSING INSECURITY: HOUSING INSTABILITY UNSPECIFIED: Z59.819

## 2023-10-12 NOTE — PROGRESS NOTES
GYN Annual Exam     CC- Here for annual exam.     Brittany Sheppard is a 43 y.o. female who presents for annual well woman exam. Periods are irregular, lasting  4-7  days. Dysmenorrhea:moderate, occurring throughout menses. Cyclic symptoms include none. No intermenstrual bleeding, spotting, or discharge.  Sometimes, she is coming on twice a month and other times    and then again . No period again until . The closest together it has been is 24. Recently, it was 38 days apart. She had an endometrial biopsy last year that returned with benign proliferative endometrium.     OB History          0    Para   0    Term   0       0    AB   0    Living   0         SAB   0    IAB   0    Ectopic   0    Molar   0    Multiple   0    Live Births   0              Currently sexually active with one male.   Current contraception: none  History of abnormal Pap smear: yes - 22- LSIL, ASC-H, HR other HPV positive--> 10/19/22- colposcopy showed NATHANAEL 1 at 5:00, NATHANAEL 2 at 7:00, ECC benign   Family history of uterine, colon or ovarian cancer: no  History of abnormal mammogram:  n/a   Family history of breast cancer: yes - maternal and paternal grandmothers had breast cancer.   Last Pap : 22- LSIL, ASC-H, HR other HPV positive   Last Completed Pap Smear            PAP SMEAR (Every 3 Years) Next due on 2022  IGP, Apt HPV,rfx 16 / 18,45                   Last mammogram: History of left breast abscess in early    Last Completed Mammogram       This patient has no relevant Health Maintenance data.             Past Medical History:   Diagnosis Date    Acute recurrent frontal sinusitis 2022    Anemia     Anxiety     Arthritis     Asthma     Back pain     Bipolar 1 disorder     COPD (chronic obstructive pulmonary disease)     Depression     Dyspnea     Elevated liver enzymes     GERD (gastroesophageal reflux disease)     H. pylori infection     treated 2016    Hepatitis C  03/2015    ladi completed; attributed to tatoo    Hirsutism     History of migraine headaches     with scotomata    HPV in female 04/23/2021    HPV in female 03/2014    HPV in female 10/2015    Hypertension     IBS (irritable bowel syndrome)     Insomnia     LGSIL on Pap smear of cervix 01/2016    cx biopsy confoirmed LGSIL    Obesity     PCOS (polycystic ovarian syndrome)     PCR positive for herpes simplex virus type 1 (HSV-1) DNA 2018    Preop cardiovascular exam 10/10/2014    Sleep apnea     Steatosis of liver     liver biopsy; moderate; stage 3, grade 0    Vitamin D deficiency        Past Surgical History:   Procedure Laterality Date    APPENDECTOMY  07/19/2010    PAT [facility]    BREAST SURGERY      I & D of abcess X3    D & C HYSTEROSCOPY ENDOMETRIAL ABLATION  12/16/2010    menorrhagia; pathology benign    LAPAROSCOPIC GASTRIC BANDING  03/27/2017    Dr. Prieto Colon [facility]    LIVER BIOPSY      PILONIDAL CYST / SINUS EXCISION  09/2008    POLYPECTOMY      TOOTH EXTRACTION  2016    WISDOM TOOTH EXTRACTION  2012         Current Outpatient Medications:     buPROPion XL (Wellbutrin XL) 300 MG 24 hr tablet, Take 1 tablet by mouth Daily., Disp: 30 tablet, Rfl: 0    hydroCHLOROthiazide (HYDRODIURIL) 25 MG tablet, Take 1 tablet by mouth daily., Disp: 90 tablet, Rfl: 1    metoprolol tartrate (LOPRESSOR) 50 MG tablet, Take 1 tablet by mouth 2 (Two) Times a Day., Disp: 180 tablet, Rfl: 0    multivitamin with minerals tablet tablet, Daily., Disp: , Rfl:     nicotine (Nicoderm CQ) 14 MG/24HR patch, Place 1 patch on the skin as directed by provider Daily., Disp: 28 each, Rfl: 0    valACYclovir (Valtrex) 500 MG tablet, Take 1 tablet by mouth 2 (Two) Times a Day., Disp: 90 tablet, Rfl: 2    Allergies   Allergen Reactions    Cephalexin Hives    Penicillins Anaphylaxis and Hives    Prunus Persica Anaphylaxis    Cefaclor Hives    Codeine Hives    Morphine Hives and Itching    Latex Hives and Rash     Hives and anaphlaxic         Social  "History     Tobacco Use    Smoking status: Every Day    Smokeless tobacco: Never   Substance Use Topics    Alcohol use: Yes     Comment: occassionally       Family History   Problem Relation Age of Onset    Diabetes Father     Hypertension Father     Migraines Father     Alcohol abuse Father     Hypertension Mother     Obesity Mother     Stroke Paternal Grandmother     Breast cancer Paternal Grandmother     Hypertension Paternal Grandmother     Pancreatic cancer Paternal Grandmother     Stroke Maternal Grandmother     Breast cancer Maternal Grandmother 72    Diabetes Maternal Grandmother     Hypertension Maternal Grandmother     Stroke Maternal Grandfather     Cancer Maternal Grandfather        Review of Systems   Genitourinary:  Positive for breast lump (small area where she previously had left breast abscess on underside of breast) and menstrual problem.       Ht 162.6 cm (64.02\")   Wt 122 kg (270 lb)   LMP 09/04/2023 (Exact Date)   BMI 46.32 kg/mý     Physical Exam  Vitals reviewed. Exam conducted with a chaperone present.   Constitutional:       General: She is not in acute distress.  HENT:      Head: Normocephalic and atraumatic.      Right Ear: External ear normal.      Left Ear: External ear normal.   Eyes:      Extraocular Movements: Extraocular movements intact.      Pupils: Pupils are equal, round, and reactive to light.   Cardiovascular:      Rate and Rhythm: Normal rate.   Pulmonary:      Effort: Pulmonary effort is normal. No respiratory distress.   Chest:   Breasts:     Right: No swelling, bleeding, inverted nipple, mass, nipple discharge, skin change or tenderness.      Left: No swelling, bleeding, inverted nipple, mass, nipple discharge, skin change or tenderness.       Abdominal:      General: There is no distension.      Palpations: Abdomen is soft.      Tenderness: There is no abdominal tenderness. There is no guarding or rebound.   Genitourinary:     General: Normal vulva.      Exam position: " Lithotomy position.      Labia:         Right: No rash, tenderness, lesion or injury.         Left: No rash, tenderness, lesion or injury.       Urethra: No prolapse or urethral swelling.      Vagina: No vaginal discharge, erythema, tenderness, bleeding or lesions.      Cervix: Normal.      Uterus: Not enlarged, not fixed and not tender.       Adnexa:         Right: No mass, tenderness or fullness.          Left: No mass, tenderness or fullness.     Musculoskeletal:         General: No deformity. Normal range of motion.      Cervical back: Normal range of motion and neck supple.   Lymphadenopathy:      Upper Body:      Right upper body: No supraclavicular or axillary adenopathy.      Left upper body: No supraclavicular or axillary adenopathy.      Lower Body: No right inguinal adenopathy. No left inguinal adenopathy.   Skin:     General: Skin is warm and dry.   Neurological:      General: No focal deficit present.      Mental Status: She is alert and oriented to person, place, and time.   Psychiatric:         Mood and Affect: Mood normal.         Behavior: Behavior normal.       Assessment     1) GYN annual well woman exam.   2) Breast cancer screening   3) cervical cancer screening  4) History of cervical dysplasia      Plan     1) Breast Health - Clinical breast exam & mammogram yearly, Self breast awareness monthly. Ordered mammogram for breast cancer screening.   2) Pap - Collected pap smear for cervical cancer screening and history of NATHANAEL I-II. She had elected to proceed with LEEP last year but this fell through. We discussed if pap smear returns abnormal, will schedule for LEEP in the OR. If normal, we plan for repeat pap smear in 6 months to 1 year. Will notify patient of results.   3) Smoking status- Current every day smoker. Encouraged cessation.   4) Activity recommends - Adult 150-300 min/week of multi-component physical activities that include balance training, aerobic and physical strengthening.    5)  contraception- none.   6) STD screening- Ordered HIV, Hep B, Hep C, RPR and collected GC/CT/Trich with pap smear today.   7) Follow up prn and one year      Stefany Wilburn MD  10/12/2023  08:59 EDT

## 2023-10-12 NOTE — PROGRESS NOTES
"    I N T E R N A L  M E D I C I N E  Jane Buenrostro, APRN      ENCOUNTER DATE:  10/12/2023    Brittany Sheppard / 43 y.o. / female      CHIEF COMPLAINT     Annual Exam    Bipolar 1 with current depression:  She is tolerating increase dose of Wellbutrin  mg to 300 mg well and continues with mild improvement of depression/ anxiety symptoms.  No rosamaria symptoms.  She missed her scheduled [facility] psychiatry appointment, and is requesting referral to [facility] psychiatry.  No SI/ HI.  Report Wellbutrin has helped her quit smoking.  Last cigarette use was two weeks ago.  She reports she is having difficulty finding a new apartment, and is concerned about finding a suitable location because she needs to move out of her current living situation this winter.      HTN: Today's BP is mildly elevated, 128/90, on HCTZ 25 mg daily, metoprolol tartrate 50 mg BID.    Updated pap at today's GYN appointment and has plans for LEEP in near future.    BMI 46: History of lap band in 2017.  She reports frequent episodes of nausea/ vomiting after eating.  Agreeable to re establish with [facility] Bariatrics department.  She is interested in pursuing possible GLP-1 use to assist with losing weight.  No personal/ family history of pancreatitis/ thyroid disease.    Prior diagnosis of KENDRA, but no longer wearing CPAP.  She is agreeable to re establish with sleep medicine.          VITALS     Visit Vitals  /90   Pulse 73   Temp 97.6 øF (36.4 øC)   Ht 162.6 cm (64.02\")   Wt 123 kg (271 lb)   LMP 10/04/2023 (Exact Date)   SpO2 100%   BMI 46.49 kg/mý       BP Readings from Last 3 Encounters:   10/12/23 128/90   10/12/23 116/80   09/22/23 132/82     Wt Readings from Last 3 Encounters:   10/12/23 123 kg (271 lb)   10/12/23 122 kg (270 lb)   09/22/23 123 kg (272 lb)      Body mass index is 46.49 kg/mý.       MEDICATIONS     Current Outpatient Medications on File Prior to Visit   Medication Sig Dispense Refill    buPROPion XL " (Wellbutrin XL) 300 MG 24 hr tablet Take 1 tablet by mouth Daily. 30 tablet 0    hydroCHLOROthiazide (HYDRODIURIL) 25 MG tablet Take 1 tablet by mouth daily. 90 tablet 1    metoprolol tartrate (LOPRESSOR) 50 MG tablet Take 1 tablet by mouth 2 (Two) Times a Day. 180 tablet 0    multivitamin with minerals tablet tablet Daily.      valACYclovir (Valtrex) 500 MG tablet Take 1 tablet by mouth 2 (Two) Times a Day. 90 tablet 2    [DISCONTINUED] nicotine (Nicoderm CQ) 14 MG/24HR patch Place 1 patch on the skin as directed by provider Daily. 28 each 0    [DISCONTINUED] pantoprazole (PROTONIX) 40 MG EC tablet Take 1 tablet by mouth Daily. 30 tablet 1     No current facility-administered medications on file prior to visit.         HISTORY OF PRESENT ILLNESS     Brittany presents for annual health maintenance visit.    General health: good  Lifestyle:  Attempting to lose weight?: Yes   Diet: eats decently  Exercise: exercises rarely  Tobacco: Former smoker   Alcohol: occasional/infrequent  Work: Full-time  Reproductive health:  Sexually active?: Yes   Sexual problems?: No problems  Concern for STD?: No    Sees Gynecologist?: Yes, pap was done today, plan for possible LEEP  Rosy/Postmenopausal?: No   Domestic abuse concerns: No   Depression Screenin/23/2023    11:39 AM   PHQ-2/PHQ-9 Depression Screening   Little Interest or Pleasure in Doing Things 1-->several days   Feeling Down, Depressed or Hopeless 3-->nearly every day   Trouble Falling or Staying Asleep, or Sleeping Too Much 3-->nearly every day   Feeling Tired or Having Little Energy 3-->nearly every day   Poor Appetite or Overeating 3-->nearly every day   Feeling Bad about Yourself - or that You are a Failure or Have Let Yourself or Your Family Down 1-->several days   Trouble Concentrating on Things, Such as Reading the Newspaper or Watching Television 3-->nearly every day   Moving or Speaking So Slowly that Other People Could Have Noticed? Or the Opposite -  Being So Fidgety 3-->nearly every day   Thoughts that You Would be Better Off Dead or of Hurting Yourself in Some Way 0-->not at all   PHQ-9: Brief Depression Severity Measure Score 20   If You Checked Off Any Problems, How Difficult Have These Problems Made It For You to Do Your Work, Take Care of Things at Home, or Get Along with Other People? somewhat difficult         PHQ-2: N/A (Has diagnosis of depression and is on treatment)   PHQ-9: 5-9 (Mild Depression)    Patient Care Team:  Jane Buenrostro APRN as PCP - General (Family Medicine)      ALLERGIES  Allergies   Allergen Reactions    Cephalexin Hives    Penicillins Anaphylaxis and Hives    Prunus Persica Anaphylaxis    Cefaclor Hives    Codeine Hives    Morphine Hives and Itching    Latex Hives and Rash     Hives and anaphlaxic          PFSH:     The following portions of the patient's history were reviewed and updated as appropriate: Allergies / Current Medications / Past Medical History / Surgical History / Social History / Family History    PROBLEM LIST   Patient Active Problem List   Diagnosis    Abscess of axilla, left    Lumbago    Acute hepatitis C without hepatic coma    Anxiety and depression    Asthma    Atopic contact dermatitis    Benign hypertension    Breast abscess of female    Cervical high risk HPV (human papillomavirus) test positive    Chondromalacia of right patella    Chronic pain of right knee    Gastroesophageal reflux disease    Hamstring tightness of right lower extremity    Gastric banding status    History of smoking    HSV-2 seropositive    Intractable chronic migraine without aura and without status migrainosus    LGSIL (low grade squamous intraepithelial dysplasia)    Morbid obesity    Morbid obesity with body mass index of 50.0-59.9 in adult    Patellofemoral syndrome of both knees    Pes anserinus bursitis of right knee    Primary osteoarthritis of both knees    Pruritic rash    Sepsis    Vitamin D deficiency    Osteoarthritis of  multiple joints    Acute cystitis with hematuria    Generalized abdominal pain       PAST MEDICAL HISTORY  Past Medical History:   Diagnosis Date    Acute recurrent frontal sinusitis 9/1/2022    Anemia     Anxiety     Arthritis     Asthma     Back pain     Bipolar 1 disorder     COPD (chronic obstructive pulmonary disease)     Depression     Dyspnea     Elevated liver enzymes     GERD (gastroesophageal reflux disease)     H. pylori infection     treated 8/2016    Hepatitis C 03/2015    harvoni completed; attributed to tatoo    Hirsutism     History of migraine headaches     with scotomata    HPV in female 04/23/2021    HPV in female 03/2014    HPV in female 10/2015    Hypertension     IBS (irritable bowel syndrome)     Insomnia     LGSIL on Pap smear of cervix 01/2016    cx biopsy confoirmed LGSIL    Obesity     PCOS (polycystic ovarian syndrome)     PCR positive for herpes simplex virus type 1 (HSV-1) DNA 2018    Preop cardiovascular exam 10/10/2014    Sleep apnea     Steatosis of liver     liver biopsy; moderate; stage 3, grade 0    Vitamin D deficiency        SURGICAL HISTORY  Past Surgical History:   Procedure Laterality Date    APPENDECTOMY  07/19/2010    PAT [facility]    BREAST SURGERY      I & D of abcess X3    D & C HYSTEROSCOPY ENDOMETRIAL ABLATION  12/16/2010    menorrhagia; pathology benign    LAPAROSCOPIC GASTRIC BANDING  03/27/2017    Dr. Prieto Colon [facility]    LIVER BIOPSY      PILONIDAL CYST / SINUS EXCISION  09/2008    POLYPECTOMY      TOOTH EXTRACTION  2016    WISDOM TOOTH EXTRACTION  2012       SOCIAL HISTORY  Social History     Socioeconomic History    Marital status:    Tobacco Use    Smoking status: Every Day    Smokeless tobacco: Never   Substance and Sexual Activity    Alcohol use: Yes     Comment: occassionally    Sexual activity: Yes     Partners: Male, Female       FAMILY HISTORY  Family History   Problem Relation Age of Onset    Diabetes Father     Hypertension Father     Migraines Father      Alcohol abuse Father     Hypertension Mother     Obesity Mother     Stroke Paternal Grandmother     Breast cancer Paternal Grandmother     Hypertension Paternal Grandmother     Pancreatic cancer Paternal Grandmother     Stroke Maternal Grandmother     Breast cancer Maternal Grandmother 72    Diabetes Maternal Grandmother     Hypertension Maternal Grandmother     Stroke Maternal Grandfather     Cancer Maternal Grandfather        IMMUNIZATION HISTORY  Immunization History   Administered Date(s) Administered    COVID-19 (PFIZER) Purple Cap Monovalent 09/10/2021, 10/13/2021    Fluzone (or Fluarix & Flulaval for VFC) >6mos 11/29/2018, 10/27/2021    Hepatitis A 08/11/2018, 09/09/2019    INFLUENZA SPLIT TRI 10/01/2020    Tdap 08/01/2014         REVIEW OF SYSTEMS     Review of Systems   Constitutional:  Negative for chills, fever and unexpected weight change.   Respiratory:  Negative for cough, chest tightness and shortness of breath.    Cardiovascular:  Negative for chest pain, palpitations and leg swelling.   Neurological:  Negative for dizziness, weakness, light-headedness and headaches.   Psychiatric/Behavioral:  Positive for dysphoric mood and sleep disturbance. Negative for self-injury and suicidal ideas. The patient is not nervous/anxious.          PHYSICAL EXAMINATION     Physical Exam  Vitals reviewed.   Constitutional:       General: She is not in acute distress.     Appearance: Normal appearance. She is not ill-appearing, toxic-appearing or diaphoretic.   HENT:      Head: Normocephalic and atraumatic.      Right Ear: Tympanic membrane, ear canal and external ear normal. There is no impacted cerumen.      Left Ear: Tympanic membrane, ear canal and external ear normal. There is no impacted cerumen.      Nose: Nose normal. No congestion or rhinorrhea.      Mouth/Throat:      Mouth: Mucous membranes are moist.      Pharynx: Oropharynx is clear. No oropharyngeal exudate or posterior oropharyngeal erythema.   Eyes:       Extraocular Movements: Extraocular movements intact.      Conjunctiva/sclera: Conjunctivae normal.      Pupils: Pupils are equal, round, and reactive to light.   Cardiovascular:      Rate and Rhythm: Normal rate and regular rhythm.      Heart sounds: Normal heart sounds.   Pulmonary:      Effort: Pulmonary effort is normal. No respiratory distress.      Breath sounds: Normal breath sounds.   Abdominal:      General: Bowel sounds are normal.      Palpations: Abdomen is soft.      Tenderness: There is no abdominal tenderness.   Musculoskeletal:         General: Normal range of motion.      Cervical back: Normal range of motion and neck supple.      Right lower leg: No edema.      Left lower leg: No edema.   Lymphadenopathy:      Cervical: No cervical adenopathy.   Skin:     General: Skin is warm and dry.   Neurological:      General: No focal deficit present.      Mental Status: She is alert and oriented to person, place, and time. Mental status is at baseline.   Psychiatric:         Mood and Affect: Mood normal.         Behavior: Behavior normal.         Thought Content: Thought content normal.         Judgment: Judgment normal.         REVIEWED DATA      Labs:    Lab Results   Component Value Date     08/23/2023    K 4.0 08/23/2023    CALCIUM 9.5 08/23/2023    AST 15 08/23/2023    ALT 14 08/23/2023    BUN 13 08/23/2023    CREATININE 0.90 08/23/2023    CREATININE 0.78 03/23/2023    CREATININE 0.81 02/08/2023       Lab Results   Component Value Date    GLUCOSE 92 08/23/2023    HGBA1C 5.40 08/23/2023    HGBA1C 5.30 09/14/2022    HGBA1C 5.1 07/27/2022    TSH 1.150 08/29/2023    FREET4 0.90 (L) 08/23/2023       Lab Results   Component Value Date    LDL 94 08/23/2023    HDL 59 08/23/2023    TRIG 60 08/23/2023    CHOLHDLRATIO 2.80 08/23/2023       Lab Results   Component Value Date    HDWF82NO 21.7 (L) 07/27/2022        Lab Results   Component Value Date    WBC 7.46 08/23/2023    HGB 13.2 08/23/2023    .0  (H) 08/23/2023     08/23/2023       Lab Results   Component Value Date    PROTEIN Negative 08/23/2023    GLUCOSEU Negative 08/23/2023    BLOODU Negative 08/23/2023    NITRITEU Negative 08/23/2023    LEUKOCYTESUR Negative 08/23/2023          Lab Results   Component Value Date    HEPCVIRUSABY REACTIVE (A) 04/23/2021       Imaging:        Medical Tests:        ASSESSMENT & PLAN     ANNUAL WELLNESS EXAM / PHYSICAL     Diagnoses and all orders for this visit:    1. Annual physical exam (Primary)  -     Hepatitis B Surface Antibody    2. Bipolar 1 disorder, depressed, moderate  -     Ambulatory Referral to Psychiatry    3. Personal history of gastric banding  -     Ambulatory Referral to Bariatric Surgery    4. Class 3 severe obesity due to excess calories with serious comorbidity and body mass index (BMI) of 45.0 to 49.9 in adult  -     Semaglutide-Weight Management (Wegovy) 0.25 MG/0.5ML solution auto-injector; Inject 0.25 mg under the skin into the appropriate area as directed 1 (One) Time Per Week for 4 doses.  Dispense: 2 mL; Refill: 0    5. Housing insecurity  -     Ambulatory Referral to Social Care Services (Amb Case Mgmt)    6. Macrocytosis  -     Vitamin B12  -     Vitamin D,25-Hydroxy  -     Folate    7. Sleep disturbance  -     Ambulatory Referral to Sleep Medicine    8. KENDRA (obstructive sleep apnea)  -     Ambulatory Referral to Sleep Medicine         Summary/Discussion:     Patient's depression symptoms are improving.  Continue Wellbutrin 300 XL at this time.  She was encouraged to follow up with psychiatry referral.  Discussed risks/ benefits/ side effects of GLP-1 use, including concern for possible worsening her existing nausea, vomiting symptoms.  She acknowledges and would like to proceed.  She will provide me with an update via [email or web address] on tolerability in a couple weeks.    Next Appointment with me: Visit date not found    Return in about 4 months (around 2/12/2024) for recheck + 1 year annual  physical.      HEALTHCARE MAINTENANCE ISSUES     Cancer Screening:  Colon: Initial/Next screening at age: 47  Repeat colon cancer screening: N/A at this time  Breast: Recommended monthly self exams; annual professional exam  Mammogram: every 1 year  Cervical: 1 year  Skin: Monthly self skin examination, annual exam by health professional  Lung: Does not meet criteria for lung cancer screening.   Other:    Screening Labs & Tests:  Lab results reviewed & discussed with the patient or test orders placed today.  EKG:  CV Screening: Lipid panel  DEXA (65+ or postmenopausal with risk factors):   HEP C (If born [id number], or risk factors): Previously had negative screen  Other:     Immunization/Vaccinations (to be given today unless deferred by patient)  Influenza: Recommended annual influenza vaccine  Hepatitis A: Verify immunization records  Hepatitis B: Verify immunization records  Tetanus/Pertussis: Up to date  Pneumovax: Recommended here or at pharmacy  Shingles: Not needed at this time  COVID: Plans to take the vaccine    Lifestyle Counseling:  Lifestyle Modifications: Attempt to lose weight, Improve dietary compliance, Begin progressive aerobic exercise program 3-5 days a week, Maintain low sodium diet (< 3 gm) for blood pressure, Make dinner the lightest meal of day, Continue to abstain/curtail drinking, Continue to abstain from smoking, and Reduce exposure to stress if possible  Safety Issues: Always wear seatbelt, Avoid texting while driving   Use sunscreen, regular skin examination  Recommended annual dental/vision examination.  Emotional/Stress/Sleep: Reviewed and  given when appropriate      Health Maintenance   Topic Date Due    Hepatitis B (1 of 3 - 3-dose series) 10/12/2023 (Originally 1980)    COVID-19 Vaccine (3 - 2023-24 season) 10/14/2023 (Originally 9/1/2023)    INFLUENZA VACCINE  03/31/2024 (Originally 8/1/2023)    Pneumococcal Vaccine 0-64 (1 - PCV) 10/12/2024 (Originally 7/20/1986)     TDAP/TD VACCINES (2 - Td or Tdap) 08/01/2024    ANNUAL PHYSICAL  10/12/2024    BMI FOLLOWUP  10/12/2024    PAP SMEAR  09/14/2025    HEPATITIS C SCREENING  Completed

## 2023-10-13 ENCOUNTER — REFERRAL TRIAGE (OUTPATIENT)
Dept: CASE MANAGEMENT | Facility: OTHER | Age: 43
End: 2023-10-13
Payer: COMMERCIAL

## 2023-10-13 LAB
25(OH)D3+25(OH)D2 SERPL-MCNC: 20.4 NG/ML (ref 30–100)
FOLATE SERPL-MCNC: 5.25 NG/ML (ref 4.78–24.2)
HBV SURFACE AB SER QL: REACTIVE
HBV SURFACE AG SERPL QL IA: NEGATIVE
HCV IGG SERPL QL IA: REACTIVE
HIV 1+2 AB+HIV1 P24 AG SERPL QL IA: NON REACTIVE
RPR SER QL: NORMAL
VIT B12 SERPL-MCNC: 632 PG/ML (ref 211–946)

## 2023-10-16 ENCOUNTER — PATIENT OUTREACH (OUTPATIENT)
Dept: CASE MANAGEMENT | Facility: OTHER | Age: 43
End: 2023-10-16
Payer: COMMERCIAL

## 2023-10-16 NOTE — OUTREACH NOTE
Social Work Assessment  Questions/Answers      Flowsheet Row Most Recent Value   Referral Source physician   Reason for Consult community resources, housing concerns/homeless   Preferred Language English   Advance Care Planning Reviewed no concerns identified   People in Home spouse   Current Living Arrangements home   In the past 12 months has the electric, gas, oil, or water company threatened to shut off services in your home? No   Primary Care Provided by self   Employment Status employed full-time   Current or Previous Occupation healthcare   Source of Income salary/wages   Application for Public Assistance not applied   Usual Activity Tolerance good   Current Activity Tolerance good   Medications independent   Meal Preparation independent   Housekeeping independent   Laundry independent   Shopping independent   Major Change/Loss/Stressor marriage   Transportation Anticipated car, drives self          SDOH updated and reviewed with the patient during this program:  Financial Resource Strain: Low Risk  (10/16/2023)    Overall Financial Resource Strain (CARDIA)     Difficulty of Paying Living Expenses: Not hard at all      Food Insecurity: No Food Insecurity (10/16/2023)    Hunger Vital Sign     Worried About Running Out of Food in the Last Year: Never true     Ran Out of Food in the Last Year: Never true      Transportation Needs: No Transportation Needs (10/16/2023)    PRAPARE - Transportation     Lack of Transportation (Medical): No     Lack of Transportation (Non-Medical): No      Housing Stability: Low Risk  (10/16/2023)    Housing Stability Vital Sign     Unable to Pay for Housing in the Last Year: No     Number of Places Lived in the Last Year: 1     Unstable Housing in the Last Year: No      Continuing Care   [facility] [employer] [place] [id number]    Phone: [phone]    Resource for: Housing Stability     Patient Outreach    MSW outreach to patient to  discuss community resource needs as requested per patient's primary care provider. Patient states that she is currently going through a divorce and she is looking for additional options for housing. MSW and patient discussed the low income housing list through [employer] and patient interested in receiving list. MSW will send list of housing through patient's e-mail address so she can review. MSW also discussed reviewing websites such as realtor.com, [employer], and [employer] websites for online search of rental properties. Patient denies further needs at this time and will review housing list and call back to MSW for further assistance.     So MILTON -   Ambulatory Case Management    10/16/2023, 10:34 EDT

## 2023-10-17 LAB
C TRACH RRNA CVX QL NAA+PROBE: NEGATIVE
CYTOLOGIST CVX/VAG CYTO: NORMAL
CYTOLOGY CVX/VAG DOC CYTO: NORMAL
CYTOLOGY CVX/VAG DOC THIN PREP: NORMAL
DX ICD CODE: NORMAL
HIV 1 & 2 AB SER-IMP: NORMAL
HPV GENOTYPE REFLEX: NORMAL
HPV I/H RISK 4 DNA CVX QL PROBE+SIG AMP: NEGATIVE
N GONORRHOEA RRNA CVX QL NAA+PROBE: NEGATIVE
OTHER STN SPEC: NORMAL
PATHOLOGIST CVX/VAG CYTO: NORMAL
STAT OF ADQ CVX/VAG CYTO-IMP: NORMAL
T VAGINALIS RRNA SPEC QL NAA+PROBE: NEGATIVE

## 2023-10-18 ENCOUNTER — TELEPHONE (OUTPATIENT)
Dept: INTERNAL MEDICINE | Age: 43
End: 2023-10-18
Payer: COMMERCIAL

## 2023-10-18 NOTE — TELEPHONE ENCOUNTER
PATIENT CALLED AND STATES SHE RECEIVED A MESSAGE IN REGARDS TO MEDICATION PRIOR AUTHORIZATION FOR     Semaglutide-Weight Management (Wegovy) 0.25 MG/0.5ML solution auto-injector    FROM [facility]    PHARMACY IS [facility]      PLEASE CALL AND ADVISE [phone]

## 2023-10-18 NOTE — TELEPHONE ENCOUNTER
Pt returning call regarding a prior authorization. Pt said its better to call during lunch at 12 or after 330. Call back number [phone].

## 2023-10-19 DIAGNOSIS — F31.32 BIPOLAR 1 DISORDER, DEPRESSED, MODERATE: ICD-10-CM

## 2023-10-19 RX ORDER — BUPROPION HYDROCHLORIDE 300 MG/1
300 TABLET ORAL DAILY
Qty: 90 TABLET | Refills: 1 | Status: SHIPPED | OUTPATIENT
Start: 2023-10-19

## 2023-10-20 ENCOUNTER — TELEPHONE (OUTPATIENT)
Dept: INTERNAL MEDICINE | Age: 43
End: 2023-10-20

## 2023-10-20 NOTE — TELEPHONE ENCOUNTER
Caller: [facility]    Relationship:     Best call back number: [phone]    What was the call regarding: PRIOR AUTHORIZATION SAXENDA.  [facility] NEEDS ADDITIONAL INFORMATION:  SERIES OF CLINICAL QUESTIONS.

## 2023-11-03 ENCOUNTER — TELEPHONE (OUTPATIENT)
Dept: INTERNAL MEDICINE | Age: 43
End: 2023-11-03
Payer: COMMERCIAL

## 2023-11-03 DIAGNOSIS — E66.01 CLASS 3 SEVERE OBESITY DUE TO EXCESS CALORIES WITH SERIOUS COMORBIDITY AND BODY MASS INDEX (BMI) OF 45.0 TO 49.9 IN ADULT: Primary | ICD-10-CM

## 2023-11-03 NOTE — TELEPHONE ENCOUNTER
Prescription sent.  Please have patient provide us with a status update in 3 weeks, and if tolerating well, we will send in maintenance dose of medication.

## 2023-11-06 DIAGNOSIS — I10 BENIGN HYPERTENSION: Primary | ICD-10-CM

## 2023-11-06 RX ORDER — METOPROLOL TARTRATE 50 MG/1
50 TABLET, FILM COATED ORAL 2 TIMES DAILY
Qty: 180 TABLET | Refills: 1 | Status: SHIPPED | OUTPATIENT
Start: 2023-11-06 | End: 2024-02-05

## 2023-11-07 ENCOUNTER — TELEMEDICINE (OUTPATIENT)
Dept: PSYCHIATRY | Facility: CLINIC | Age: 43
End: 2023-11-07
Payer: COMMERCIAL

## 2023-11-07 DIAGNOSIS — F33.1 MAJOR DEPRESSIVE DISORDER, RECURRENT EPISODE, MODERATE: Primary | ICD-10-CM
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
## 2023-11-14 ENCOUNTER — TELEMEDICINE (OUTPATIENT)
Dept: PSYCHIATRY | Facility: CLINIC | Age: 43
End: 2023-11-14
Payer: COMMERCIAL

## 2023-11-14 DIAGNOSIS — F33.1 MAJOR DEPRESSIVE DISORDER, RECURRENT EPISODE, MODERATE: Chronic | ICD-10-CM

## 2023-11-14 DIAGNOSIS — F41.1 GENERALIZED ANXIETY DISORDER: Primary | Chronic | ICD-10-CM

## 2023-11-14 PROCEDURE — 90792 PSYCH DIAG EVAL W/MED SRVCS: CPT | Performed by: NURSE PRACTITIONER

## 2023-11-14 NOTE — PROGRESS NOTES
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 potential adverse side effects of current and/or newly prescribed medication, alternative treatment options, and OTC medications.  Patient verbalized understanding of potential risks, any off label use of medication, any black box warnings, and any side effects in their own words. The patient verbalized understanding and agreed to comply with the safety plan discussed in their own words. Patient given the number to the office. Number also available to the 24- hour suicide hotline.      MEDS ORDERED DURING VISIT:  New Medications Ordered This Visit   Medications    sertraline (Zoloft) 50 MG tablet     Sig: Take 0.5 tablets by mouth Daily for 7 days, THEN 1 tablet Daily     Dispense:  30 tablet     Refill:  0       Return in about 4 weeks (around 12/12/2023), or if symptoms worsen or fail to improve, for Recheck.     Treatment plan completed: 11/14/23    Progress toward goal: Not at goal    Functional Status: Moderate impairment     Prognosis: Good with Ongoing Treatment         This document has been electronically signed by CHRIS Gama  November 14, 2023 17:57 EST    Some of the data in this  electronic note has been brought forward from a previous encounter, any necessary changes have been made, it has been reviewed by this APRN, and it is accurate.    Please note that portions of this note were completed with a voice recognition program. Efforts were made to edit dictation, but occasionally words are mistranscribed.

## 2023-11-14 NOTE — TREATMENT PLAN
Multi-Disciplinary Problems (from Behavioral Health Treatment Plan)      Active Problems       Problem: Anxiety  Start Date: 11/14/23      Problem Details: The patient self-scales this problem as a 8 with 10 being the worst.          Goal Priority Start Date Expected End Date End Date    Patient will develop and implement behavioral and cognitive strategies to reduce anxiety and irrational fears. -- 11/14/23 -- --    Goal Details: Progress toward goal:  Not appropriate to rate progress toward goal since this is the initial treatment plan.          Goal Intervention Frequency Start Date End Date    Help patient explore past emotional issues in relation to present anxiety. Q Month 11/14/23 --    Intervention Details: Duration of treatment until until discharged.          Goal Intervention Frequency Start Date End Date    Help patient develop an awareness of their cognitive and physical responses to anxiety. Q Month 11/14/23 --    Intervention Details: Duration of treatment until until discharged.                  Problem: Depression  Start Date: 11/14/23      Problem Details: The patient self-scales this problem as a 5 with 10 being the worst.          Goal Priority Start Date Expected End Date End Date    Patient will demonstrate the ability to initiate new constructive life skills outside of sessions on a consistent basis. -- 11/14/23 -- --    Goal Details: Progress toward goal:  Not appropriate to rate progress toward goal since this is the initial treatment plan.          Goal Intervention Frequency Start Date End Date    Assist patient in setting attainable activities of daily living goals. PRN 11/14/23 --      Goal Intervention Frequency Start Date End Date    Provide education about depression Q Month 11/14/23 --    Intervention Details: Duration of treatment until until discharged.          Goal Intervention Frequency Start Date End Date    Assist patient in developing healthy coping strategies. Q Month  11/14/23 --    Intervention Details: Duration of treatment until until discharged.                                 I have discussed and reviewed this treatment plan with the patient and/or guardian.  The patient has verbally agreed with this treatment plan (no signatures are obtained at today's visit as the patient is a telehealth patient and is unable to print and sign this document, therefore verbal agreement is obtained).

## 2023-11-15 ENCOUNTER — TELEPHONE (OUTPATIENT)
Dept: PSYCHIATRY | Facility: CLINIC | Age: 43
End: 2023-11-15
Payer: COMMERCIAL

## 2023-11-15 NOTE — TELEPHONE ENCOUNTER
I have contacted the [facility] and they do not have any records for this patient.  I have confirmed with patient that this is the correct hospital location.  I have left a voicemail with Dr Serrato office for records.

## 2023-11-15 NOTE — TELEPHONE ENCOUNTER
----- Message from CHRIS Gama sent at 11/15/2023  6:24 AM EST -----  Regarding: Psych Records  Good morning, Carla!    I saw this patient yesterday for an initial evaluation. She reported having been to [facility] in the past for IOP and saw a psychiatrist, Dr. Khan, for approximately a year. Please call the pt and see if she will provide verbal consent to obtain her psychiatric records from both.     Thank you,  CHRIS Gama

## 2023-11-27 DIAGNOSIS — E66.01 CLASS 3 SEVERE OBESITY DUE TO EXCESS CALORIES WITH SERIOUS COMORBIDITY AND BODY MASS INDEX (BMI) OF 45.0 TO 49.9 IN ADULT: Primary | ICD-10-CM

## 2023-11-27 DIAGNOSIS — E66.01 CLASS 3 SEVERE OBESITY DUE TO EXCESS CALORIES WITH SERIOUS COMORBIDITY AND BODY MASS INDEX (BMI) OF 45.0 TO 49.9 IN ADULT: ICD-10-CM

## 2023-11-27 RX ORDER — LIRAGLUTIDE 6 MG/ML
INJECTION, SOLUTION SUBCUTANEOUS
Qty: 3 ML | Refills: 0 | Status: CANCELLED | OUTPATIENT
Start: 2023-11-27

## 2023-11-28 ENCOUNTER — OFFICE VISIT (OUTPATIENT)
Dept: SLEEP MEDICINE | Facility: HOSPITAL | Age: 43
End: 2023-11-28
Payer: COMMERCIAL

## 2023-11-28 VITALS
WEIGHT: 267 LBS | HEIGHT: 64 IN | BODY MASS INDEX: 45.58 KG/M2 | DIASTOLIC BLOOD PRESSURE: 78 MMHG | OXYGEN SATURATION: 98 % | SYSTOLIC BLOOD PRESSURE: 125 MMHG | HEART RATE: 88 BPM

## 2023-11-28 DIAGNOSIS — G47.33 OSA (OBSTRUCTIVE SLEEP APNEA): ICD-10-CM

## 2023-11-28 DIAGNOSIS — G47.9 SLEEP DISTURBANCE: Primary | ICD-10-CM

## 2023-11-28 PROCEDURE — G0463 HOSPITAL OUTPT CLINIC VISIT: HCPCS

## 2023-11-28 NOTE — PROGRESS NOTES
Reason for Consultation: Sleep apnea        Patient Care Team:  Jane Buenrostro APRN as PCP - General (Family Medicine)  Nia Martínez MD, MPH as Consulting Physician (Sleep Medicine)      History of present illness:    Thank you for asking me to see your patient.  The patient is a 43 y.o. female has a diagnosis of sleep apnea perhaps 10 years ago and she had a Pap machine but she did not get on with it very well.  She notes that her  also snores quite loudly at night as she does.  She complains of anxiety and depression and is on medicine for those including bupropion and Zoloft.  She also has hypertension and takes hydrochlorothiazide and metoprolol for that.  She complains of sweating excessively during the night and grinds her teeth she also had trouble falling asleep even as a child.  She has history of being a restless sleeper and still feels sleepy when she gets extended sleep.    Complaining about this CPAP machine, she says she had difficulty finding a mask that she liked and when she used the nasal mask her mouth was dry.    She started using tobacco at age 20 and quit at age 43.  She drinks 2-3 drinks per day, 3 times per week.  She has 1 caffeinated beverage per day.    Habitual bedtime is between 9 and 10 PM and she gets up between 430 and 5 AM during the week and she is it takes an hour to fall asleep and she does not feel refreshed in the morning.  On the weekends she goes to bed between 11 PM and midnight and gets up between 4 and 5 AM and still feels tired.  Rowena: 11    Data Reviewed: Sleep questionnaire      PMH:  Past Medical History:   Diagnosis Date    Acute recurrent frontal sinusitis 9/1/2022    Anemia     Anxiety     Arthritis     Asthma     Back pain     Bipolar 1 disorder     COPD (chronic obstructive pulmonary disease)     Depression     Dyspnea     Elevated liver enzymes     GERD (gastroesophageal reflux disease)     H. 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 90 tablet 1    hydroCHLOROthiazide (HYDRODIURIL) 25 MG tablet Take 1 tablet by mouth daily. 90 tablet 1    Liraglutide (SAXENDA) 18 MG/3ML injection pen Inject 3 mg under the skin into the appropriate area as directed Daily. 15 mL 14    metoprolol tartrate (LOPRESSOR) 50 MG tablet Take 1 tablet by mouth 2 (Two) Times a Day. 180 tablet 1    multivitamin with minerals tablet tablet Daily.      sertraline (Zoloft) 50 MG tablet Take 0.5 tablets by mouth Daily for 7 days, THEN 1 tablet Daily 30 tablet 0    valACYclovir (Valtrex) 500 MG tablet Take 1 tablet by mouth 2 (Two) Times a Day. 90 tablet 2     No current facility-administered medications on file prior to visit.         Vital Signs:    Vitals:    11/28/23 1347   BP: 125/78   Pulse: 88   SpO2: 98%   Weight: 121 kg (267 lb)   Height: 162.6 cm (64.02\")        Body mass index is 45.8 kg/m².  Neck Circumference: 16 inches      Physical Exam:    Constitutional:  Well developed 43 y.o. female that appears in no apparent distress.  Awake & oriented times 3.  Normal mood with normal recent and remote " memory and normal judgement.  Eyes:  Conjunctivae normal.  Oropharynx: Moist mucous membranes without exudate and Mallampati 4  Neck: Trachea midline  Respiratory: Effort is not labored  Cardiovascular: Radial pulse regular  Musculoskeletal: Gait appears normal, no digital clubbing evident, no pre-tibial edema        Impression:   Encounter Diagnoses   Name Primary?    Sleep disturbance Yes    KENDRA (obstructive sleep apnea)      Obesity class III patient's BMI is Body mass index is 45.8 kg/m².    Patient almost certainly has sleep disordered breathing and is an appropriate candidate for home sleep apnea test.  She is in agreement with this plan.  She does have sleep apnea we will try to desensitize her to the CPAP mask.    Plan:  The patient should practice good sleep hygiene measures.      Weight loss might be beneficial in this patient who has a Body mass index is 45.8 kg/m².      Pathophysiology of KENDRA described to the patient.  Cardiovascular complications of untreated KENDRA also reviewed.      The patient was cautioned about the dangers of drowsy driving.    Domenico Martínez MD  Sleep Medicine  11/28/23  14:26 EST

## 2023-12-07 ENCOUNTER — TELEPHONE (OUTPATIENT)
Dept: INTERNAL MEDICINE | Age: 43
End: 2023-12-07
Payer: COMMERCIAL

## 2023-12-07 NOTE — TELEPHONE ENCOUNTER
Caller: Brittany Sheppard    Relationship: Self    Best call back number: [phone]     What medication are you requesting: COUGH MEDICAITON     What are your current symptoms: COVID POSITIVE, COUGH     Have you had these symptoms before:    [x] Yes  [] No    Have you been treated for these symptoms before:   [x] Yes  [] No    If a prescription is needed, what is your preferred pharmacy and phone number: [facility]     Additional notes: WOULD LIKE TO KNOW IF COUGH MEDICATION CAN BE SENT IN. HAVING NO SUCCESS WITH OVER THE COUNTER MEDICATIONS.

## 2023-12-11 ENCOUNTER — TELEPHONE (OUTPATIENT)
Dept: INTERNAL MEDICINE | Age: 43
End: 2023-12-11
Payer: COMMERCIAL

## 2023-12-11 NOTE — TELEPHONE ENCOUNTER
Patient calling to schedule video visit for today because she is Covid+ since Thursday.  Patient stated she had an appointment on Friday but was to sick and weak to make it and had to cancel it. She is needing a note for work and inhaler and  something for her cough. I couldn't find any openings for today but did for tomorrow with a different provider but she needed something today.  I did suggest going to the Urgent Care and that we had one in our building and was not as busy as other UC yet.  Please advise.

## 2023-12-12 ENCOUNTER — TELEMEDICINE (OUTPATIENT)
Dept: INTERNAL MEDICINE | Age: 43
End: 2023-12-12
Payer: COMMERCIAL

## 2023-12-12 DIAGNOSIS — U07.1 COVID-19: Primary | ICD-10-CM

## 2023-12-12 PROCEDURE — 99213 OFFICE O/P EST LOW 20 MIN: CPT

## 2023-12-12 RX ORDER — ALBUTEROL SULFATE 90 UG/1
2 AEROSOL, METERED RESPIRATORY (INHALATION) EVERY 4 HOURS PRN
Qty: 18 G | Refills: 0 | Status: SHIPPED | OUTPATIENT
Start: 2023-12-12

## 2023-12-12 RX ORDER — BENZONATATE 200 MG/1
200 CAPSULE ORAL 3 TIMES DAILY PRN
Qty: 30 CAPSULE | Refills: 0 | Status: SHIPPED | OUTPATIENT
Start: 2023-12-12

## 2023-12-12 NOTE — LETTER
December 12, 2023     Patient: Brittany Sheppard   YOB: 1980   Date of Visit: 12/12/2023       To Whom It May Concern:    It is my medical opinion that Brittany Sheppard may return to work on 12-.  Please it excuse her from work from  Thursday, December 7, returning 12-12-23           Sincerely,        CHRIS Cardenas

## 2023-12-12 NOTE — PROGRESS NOTES
I N T E R N A L  M E D I C I N E  WILIAN MAGAÑA, CHRIS      ENCOUNTER DATE:  12/12/2023    Brittany Sheppard / 43 y.o. / female        You have chosen to receive care through a telehealth visit.  Do you consent to use a video/audio connection for your medical care today? Yes    Location of patient and provider are in [place]       CHIEF COMPLAINT / REASON FOR OFFICE VISIT     TELEHEALTH ENCOUNTER:  COVID-19      ASSESSMENT & PLAN     Diagnoses and all orders for this visit:    1. COVID-19 (Primary)  -     benzonatate (TESSALON) 200 MG capsule; Take 1 capsule by mouth 3 (Three) Times a Day As Needed for Cough.  Dispense: 30 capsule; Refill: 0  -     albuterol sulfate  (90 Base) MCG/ACT inhaler; Inhale 2 puffs Every 4 (Four) Hours As Needed for Wheezing or Shortness of Air.  Dispense: 18 g; Refill: 0          SUMMARY/DISCUSSION  Will treat continued cough with albuterol inhaler PRN, tessalon.  Work note provided to patient.  She is aware of returning for non improving symptoms, visiting ER for acutely worsening symptoms, chest pain, dyspnea.    Recommend updating COVID-19 vaccine 2 months after recent infection.        Time spent: 15 minutes    Next Appointment with me: 2/16/2024    No follow-ups on file.        HISTORY OF PRESENT ILLNESS     She tested positive for COVID-19 on Thursday, December 7.  Symptoms started on Tuesday, December 5 with congestion, some chills.  She reports continued cough, some shortness of breath symptoms during the night after coughing fits.  No fever, chest pain.  Eating, drinking, urinating well.  She has now returned to work.      REVIEWED DATA     Labs:           Imaging:           Medical Tests:           Summary of old records / correspondence / consultant report:           Request outside records:         Template created by Barak Aguirre MD

## 2023-12-15 DIAGNOSIS — F41.1 GENERALIZED ANXIETY DISORDER: Chronic | ICD-10-CM

## 2023-12-15 DIAGNOSIS — F33.1 MAJOR DEPRESSIVE DISORDER, RECURRENT EPISODE, MODERATE: Chronic | ICD-10-CM

## 2023-12-21 ENCOUNTER — TRANSCRIBE ORDERS (OUTPATIENT)
Dept: ADMINISTRATIVE | Facility: HOSPITAL | Age: 43
End: 2023-12-21
Payer: COMMERCIAL

## 2023-12-21 ENCOUNTER — LAB (OUTPATIENT)
Dept: LAB | Facility: HOSPITAL | Age: 43
End: 2023-12-21
Payer: COMMERCIAL

## 2023-12-21 ENCOUNTER — HOSPITAL ENCOUNTER (OUTPATIENT)
Dept: GENERAL RADIOLOGY | Facility: HOSPITAL | Age: 43
Discharge: HOME | End: 2023-12-21
Payer: COMMERCIAL

## 2023-12-21 ENCOUNTER — HOSPITAL ENCOUNTER (OUTPATIENT)
Dept: CARDIOLOGY | Facility: HOSPITAL | Age: 43
Discharge: HOME | End: 2023-12-21
Payer: COMMERCIAL

## 2023-12-21 DIAGNOSIS — Z01.818 PRE-OP EXAM: ICD-10-CM

## 2023-12-21 DIAGNOSIS — Z01.818 PRE-OP EXAM: Primary | ICD-10-CM

## 2023-12-21 DIAGNOSIS — R10.9 STOMACH ACHE: ICD-10-CM

## 2023-12-21 DIAGNOSIS — Z01.818 PRE-OP TESTING: ICD-10-CM

## 2023-12-21 LAB
ALBUMIN SERPL-MCNC: 4.1 G/DL (ref 3.5–5.2)
ALBUMIN/GLOB SERPL: 1.4 G/DL
ALP SERPL-CCNC: 62 U/L (ref 39–117)
ALT SERPL W P-5'-P-CCNC: 11 U/L (ref 1–33)
ANION GAP SERPL CALCULATED.3IONS-SCNC: 12.2 MMOL/L (ref 5–15)
APTT PPP: 30 SECONDS (ref 22.7–35.4)
AST SERPL-CCNC: 13 U/L (ref 1–32)
BASOPHILS # BLD AUTO: 0.05 10*3/MM3 (ref 0–0.2)
BASOPHILS NFR BLD AUTO: 0.5 % (ref 0–1.5)
BILIRUB SERPL-MCNC: 0.6 MG/DL (ref 0–1.2)
BUN SERPL-MCNC: 16 MG/DL (ref 6–20)
BUN/CREAT SERPL: 15 (ref 7–25)
CALCIUM SPEC-SCNC: 9.5 MG/DL (ref 8.6–10.5)
CHLORIDE SERPL-SCNC: 101 MMOL/L (ref 98–107)
CO2 SERPL-SCNC: 25.8 MMOL/L (ref 22–29)
CREAT SERPL-MCNC: 1.07 MG/DL (ref 0.57–1)
DEPRECATED RDW RBC AUTO: 42.2 FL (ref 37–54)
EGFRCR SERPLBLD CKD-EPI 2021: 67.1 ML/MIN/1.73
EOSINOPHIL # BLD AUTO: 0.07 10*3/MM3 (ref 0–0.4)
EOSINOPHIL NFR BLD AUTO: 0.7 % (ref 0.3–6.2)
ERYTHROCYTE [DISTWIDTH] IN BLOOD BY AUTOMATED COUNT: 11.8 % (ref 12.3–15.4)
GLOBULIN UR ELPH-MCNC: 3 GM/DL
GLUCOSE SERPL-MCNC: 99 MG/DL (ref 65–99)
HCT VFR BLD AUTO: 37.9 % (ref 34–46.6)
HGB BLD-MCNC: 12.9 G/DL (ref 12–15.9)
IMM GRANULOCYTES # BLD AUTO: 0.04 10*3/MM3 (ref 0–0.05)
IMM GRANULOCYTES NFR BLD AUTO: 0.4 % (ref 0–0.5)
INR PPP: 1.01 (ref 0.9–1.1)
LYMPHOCYTES # BLD AUTO: 2.33 10*3/MM3 (ref 0.7–3.1)
LYMPHOCYTES NFR BLD AUTO: 22 % (ref 19.6–45.3)
MCH RBC QN AUTO: 33.7 PG (ref 26.6–33)
MCHC RBC AUTO-ENTMCNC: 34 G/DL (ref 31.5–35.7)
MCV RBC AUTO: 99 FL (ref 79–97)
MONOCYTES # BLD AUTO: 0.82 10*3/MM3 (ref 0.1–0.9)
MONOCYTES NFR BLD AUTO: 7.8 % (ref 5–12)
NEUTROPHILS NFR BLD AUTO: 68.6 % (ref 42.7–76)
NEUTROPHILS NFR BLD AUTO: 7.27 10*3/MM3 (ref 1.7–7)
NRBC BLD AUTO-RTO: 0.1 /100 WBC (ref 0–0.2)
PLATELET # BLD AUTO: 325 10*3/MM3 (ref 140–450)
PMV BLD AUTO: 10.3 FL (ref 6–12)
POTASSIUM SERPL-SCNC: 3.6 MMOL/L (ref 3.5–5.2)
PROT SERPL-MCNC: 7.1 G/DL (ref 6–8.5)
PROTHROMBIN TIME: 13.4 SECONDS (ref 11.7–14.2)
RBC # BLD AUTO: 3.83 10*6/MM3 (ref 3.77–5.28)
SODIUM SERPL-SCNC: 139 MMOL/L (ref 136–145)
WBC NRBC COR # BLD AUTO: 10.58 10*3/MM3 (ref 3.4–10.8)

## 2023-12-21 PROCEDURE — 93005 ELECTROCARDIOGRAM TRACING: CPT

## 2023-12-21 PROCEDURE — 36415 COLL VENOUS BLD VENIPUNCTURE: CPT

## 2023-12-21 PROCEDURE — 85025 COMPLETE CBC W/AUTO DIFF WBC: CPT

## 2023-12-21 PROCEDURE — 71046 X-RAY EXAM CHEST 2 VIEWS: CPT

## 2023-12-21 PROCEDURE — 85610 PROTHROMBIN TIME: CPT

## 2023-12-21 PROCEDURE — 85730 THROMBOPLASTIN TIME PARTIAL: CPT

## 2023-12-21 PROCEDURE — 87081 CULTURE SCREEN ONLY: CPT

## 2023-12-21 PROCEDURE — 80053 COMPREHEN METABOLIC PANEL: CPT

## 2023-12-22 LAB
MRSA SPEC QL CULT: NORMAL
QT INTERVAL: 378 MS
QTC INTERVAL: 423 MS

## 2023-12-28 ENCOUNTER — TELEMEDICINE (OUTPATIENT)
Dept: PSYCHIATRY | Facility: CLINIC | Age: 43
End: 2023-12-28
Payer: COMMERCIAL

## 2023-12-28 DIAGNOSIS — F33.1 MAJOR DEPRESSIVE DISORDER, RECURRENT EPISODE, MODERATE: Chronic | ICD-10-CM

## 2023-12-28 DIAGNOSIS — F41.1 GENERALIZED ANXIETY DISORDER: Primary | Chronic | ICD-10-CM

## 2023-12-28 PROCEDURE — 99214 OFFICE O/P EST MOD 30 MIN: CPT | Performed by: NURSE PRACTITIONER

## 2023-12-28 RX ORDER — SERTRALINE HYDROCHLORIDE 100 MG/1
100 TABLET, FILM COATED ORAL DAILY
Qty: 30 TABLET | Refills: 1 | Status: SHIPPED | OUTPATIENT
Start: 2023-12-28

## 2023-12-28 NOTE — PROGRESS NOTES
"This provider is completing this appointment through [facility] (through [facility]), [address], [place], [address] using a secure [facility] through [employer]. Patient is being seen remotely via telehealth in [place], and stated they are in a secure environment for this session. The patient's condition being diagnosed/treated is appropriate for telemedicine. The provider identified herself as well as her credentials.   The patient, and/or patients guardian, consent to be seen remotely, and when consent is given they understand that the consent allows for patient identifiable information to be sent to a third party as needed.   They may refuse to be seen remotely at any time. The electronic data is encrypted and password protected, and the patient and/or guardian has been advised of the potential risks to privacy not withstanding such measures.    You have chosen to receive care through a telehealth visit.  Do you consent to use a video/audio connection for your medical care today? Yes    Patient identifiers utilized: Name and date of birth.        Subjective   Brittany Sheppard is a 43 y.o. female who presents today for follow up     Chief Complaint:  Depression and anxiety    Accompanied by: Pt was alone for duration of appointment    History of Present Illness:   Pt was last seen by this APRN on 11/14/23.  Pt states she has been doing \"okay\". \"I've been amparo chill.\" Pt reports her overall mood has been \"blah\". She feels she has been going through the motions. Pt has been taking the Zoloft and her anxiety has been mildly decreased. However, certain situations can elevate anxiety. Pt reports they recently switched her to a different physician, so this may also help with anxiety level. Pt and her  are getting along \"for the most part\". Things at home have been \"quiet\". Sleep disruption continues; she has difficulty staying asleep. Pt is averaging 3-4 hours " of broken sleep. She typically wakes up every hour. Pt is taking Melatonin 20 mg every night. Pt doesn't wish to be on a sleep aid. Discussed trying OTC magnesium glycinate for sleep. Since her last appointment, pt saw sleep medicine on 11/28/23 and was ordered a home sleep study; it has not been completed at this time. Pt has not drank ETOH in the past two weeks. Pt is currently taking Saxenda, but hasn't lost any weight. Appetite is decreased. The patient denies any new medical problems since last appointment with this facility with the exception of having the lap band removed and COVID-19. Pt plans on having gastric sleeve completed later in 2024. The patient reports compliance with current medication regimen. The patient denies any current side effects from their current medication regimen. The patient denies any abnormal muscle movements or tics. The patient rates their depression on average in the past week at a 5/10 on a 0-10 scale, with 10 being the worst. The patient rates their anxiety on average the past week at a 6/10 on a 0-10 scale, with 10 being the worst. The patient rates their irritability on average the past week at a 6/10 on a 0-10 scale, with 10 being the worst. The patient would like to increase their medications at this visit. The patient denies any suicidal or homicidal ideations, plans, or intent at today's encounter and is convincing. The patient denies any auditory hallucinations or visual hallucinations. The patient does not endorse any significant symptoms consistent with rosamaria or psychosis at today's encounter.     *If the patient has any concerns or needs assistance, they may call the [facility] at [phone]*        Prior Psychiatric Medications:  Wellbutrin XL - helped her quit smoking  Abilify - insomnia  Atarax - helps with sleep onset  Elavil - diaphoresis, RLS  Seroquel - diaphoresis, gaining weight, prescribed for sleep  Effexor -  "diaphoresis  Cymbalta - diaphoresis  Celexa - some benefit; was on it for six months and stopped taking it because it made her \"numb\"  Ambien - \"crazy stuff\" at night time        The following portions of the patient's history were reviewed and updated as appropriate: allergies, current medications, past family history, past medical history, past social history, past surgical history and problem list.          Past Medical History:  Past Medical History:   Diagnosis Date    Acute recurrent frontal sinusitis 9/1/2022    Anemia     Anxiety     Arthritis     Asthma     Back pain     Bipolar 1 disorder     COPD (chronic obstructive pulmonary disease)     Depression     Dyspnea     Elevated liver enzymes     GERD (gastroesophageal reflux disease)     H. pylori infection     treated 8/2016    Hepatitis C 03/2015    harvoni completed; attributed to tatoo    Hirsutism     History of migraine headaches     with scotomata    HPV in female 04/23/2021    HPV in female 03/2014    HPV in female 10/2015    Hypertension     IBS (irritable bowel syndrome)     Insomnia     LGSIL on Pap smear of cervix 01/2016    cx biopsy confoirmed LGSIL    Obesity     PCOS (polycystic ovarian syndrome)     PCR positive for herpes simplex virus type 1 (HSV-1) DNA 2018    Preop cardiovascular exam 10/10/2014    Sleep apnea     Steatosis of liver     liver biopsy; moderate; stage 3, grade 0    Vitamin D deficiency        Social History:  Social History     Socioeconomic History    Marital status:    Tobacco Use    Smoking status: Former     Types: Cigarettes    Smokeless tobacco: Never   Vaping Use    Vaping Use: Never used   Substance and Sexual Activity    Alcohol use: Yes     Comment: occassionally    Drug use: Not Currently     Types: Marijuana    Sexual activity: Yes     Partners: Male, Female       Family History:  Family History   Problem Relation Age of Onset    Hypertension Mother     Obesity Mother     Bipolar disorder Father     " Diabetes Father     Hypertension Father     Migraines Father     Alcohol abuse Father     Stroke Maternal Grandfather     Cancer Maternal Grandfather     Stroke Maternal Grandmother     Breast cancer Maternal Grandmother 72    Diabetes Maternal Grandmother     Hypertension Maternal Grandmother     Stroke Paternal Grandmother     Breast cancer Paternal Grandmother     Hypertension Paternal Grandmother     Pancreatic cancer Paternal Grandmother     Schizophrenia Nephew     Bipolar disorder Nephew        Past Surgical History:  Past Surgical History:   Procedure Laterality Date    APPENDECTOMY  07/19/2010    [facility]    BREAST SURGERY      I & D of abcess X3    D & C HYSTEROSCOPY ENDOMETRIAL ABLATION  12/16/2010    menorrhagia; pathology benign    LAPAROSCOPIC GASTRIC BANDING  03/27/2017    Dr. Prieto [facility]    LIVER BIOPSY      PILONIDAL CYST / SINUS EXCISION  09/2008    POLYPECTOMY      TOOTH EXTRACTION  2016    WISDOM TOOTH EXTRACTION  2012       Problem List:  Patient Active Problem List   Diagnosis    Abscess of axilla, left    Lumbago    Acute hepatitis C without hepatic coma    Anxiety and depression    Asthma    Atopic contact dermatitis    Benign hypertension    Breast abscess of female    Cervical high risk HPV (human papillomavirus) test positive    Chondromalacia of right patella    Chronic pain of right knee    Gastroesophageal reflux disease    Hamstring tightness of right lower extremity    Gastric banding status    History of smoking    HSV-2 seropositive    Intractable chronic migraine without aura and without status migrainosus    LGSIL (low grade squamous intraepithelial dysplasia)    Morbid obesity    Morbid obesity with body mass index of 50.0-59.9 in adult    Patellofemoral syndrome of both knees    Pes anserinus bursitis of right knee    Primary osteoarthritis of both knees    Pruritic rash    Sepsis    Vitamin D deficiency    Osteoarthritis of multiple joints    Acute cystitis with hematuria     Generalized abdominal pain       Allergy:   Allergies   Allergen Reactions    Cephalexin Hives    Penicillins Anaphylaxis and Hives    Prunus Persica Anaphylaxis    Cefaclor Hives    Codeine Hives    Morphine Hives and Itching    Latex Hives and Rash     Hives and anaphlaxic          Current Medications:   Current Outpatient Medications   Medication Sig Dispense Refill    albuterol sulfate  (90 Base) MCG/ACT inhaler Inhale 2 puffs Every 4 (Four) Hours As Needed for Wheezing or Shortness of Air. 18 g 0    buPROPion XL (Wellbutrin XL) 300 MG 24 hr tablet Take 1 tablet by mouth Daily. 90 tablet 1    hydroCHLOROthiazide (HYDRODIURIL) 25 MG tablet Take 1 tablet by mouth daily. 90 tablet 1    Liraglutide (SAXENDA) 18 MG/3ML injection pen Inject 3 mg under the skin into the appropriate area as directed Daily. 15 mL 14    metoprolol tartrate (LOPRESSOR) 50 MG tablet Take 1 tablet by mouth 2 (Two) Times a Day. 180 tablet 1    sertraline (Zoloft) 100 MG tablet Take 1 tablet by mouth Daily. 30 tablet 1    valACYclovir (Valtrex) 500 MG tablet Take 1 tablet by mouth 2 (Two) Times a Day. 90 tablet 2     No current facility-administered medications for this visit.       Review of Symptoms:    Review of Systems   Constitutional:  Positive for appetite change and fatigue.   Psychiatric/Behavioral:  Positive for agitation, dysphoric mood, sleep disturbance and depressed mood. The patient is nervous/anxious.          Physical Exam:   Due to the remote nature of this encounter (virtual encounter), vitals were unable to be obtained.  Height stated at 64 inches.  Weight stated at 263-267 pounds.      Physical Exam  Neurological:      Mental Status: She is alert.   Psychiatric:         Attention and Perception: Attention and perception normal. She does not perceive auditory or visual hallucinations.         Mood and Affect: Mood is anxious and depressed.         Speech: Speech normal.         Behavior: Behavior normal. Behavior  is cooperative.         Thought Content: Thought content normal. Thought content is not paranoid or delusional. Thought content does not include homicidal or suicidal ideation. Thought content does not include homicidal or suicidal plan.         Cognition and Memory: Cognition and memory normal.      Comments: Restricted affect           Mental Status Exam:   Hygiene:   good  Cooperation:  Cooperative  Eye Contact:  Good  Psychomotor Behavior:  Appropriate  Affect:  Restricted  Mood: depressed and anxious  Speech:  Normal  Thought Process:  Linear  Thought Content:  Mood congruent  Suicidal:  None  Homicidal:  None  Hallucinations:  None  Delusion:  None  Memory:  Intact  Orientation:  Person, Place, Time, and Situation  Reliability:  good  Insight:  Fair  Judgement:  Fair  Impulse Control:  Fair      PHQ-9 Depression Screening  Little interest or pleasure in doing things? (P) 2-->more than half the days   Feeling down, depressed, or hopeless? (P) 1-->several days   Trouble falling or staying asleep, or sleeping too much? (P) 1-->several days   Feeling tired or having little energy? (P) 1-->several days   Poor appetite or overeating? (P) 1-->several days   Feeling bad about yourself - or that you are a failure or have let yourself or your family down? (P) 0-->not at all   Trouble concentrating on things, such as reading the newspaper or watching television? (P) 1-->several days   Moving or speaking so slowly that other people could have noticed? Or the opposite - being so fidgety or restless that you have been moving around a lot more than usual? (P) 0-->not at all   Thoughts that you would be better off dead, or of hurting yourself in some way? (P) 0-->not at all   PHQ-9 Total Score (P) 7   If you checked off any problems, how difficult have these problems made it for you to do your work, take care of things at home, or get along with other people? (P) somewhat difficult     PHQ-9 Total Score: (P)  7      ALYX-7  Feeling nervous, anxious or on edge: (P) More than half the days  Not being able to stop or control worrying: (P) Several days  Worrying too much about different things: (P) Several days  Trouble Relaxing: (P) Several days  Being so restless that it is hard to sit still: (P) Several days  Feeling afraid as if something awful might happen: (P) Several days  Becoming easily annoyed or irritable: (P) More than half the days  ALYX 7 Total Score: (P) 9  If you checked any problems, how difficult have these problems made it for you to do your work, take care of things at home, or get along with other people: (P) Somewhat difficult      Previous Provider notes and available records reviewed by this APRN at today's encounter.         Lab Results:   Hospital Outpatient Visit on 12/21/2023   Component Date Value Ref Range Status    QT Interval 12/21/2023 378  ms Final    QTC Interval 12/21/2023 423  ms Final   Lab on 12/21/2023   Component Date Value Ref Range Status    Glucose 12/21/2023 99  65 - 99 mg/dL Final    BUN 12/21/2023 16  6 - 20 mg/dL Final    Creatinine 12/21/2023 1.07 (H)  0.57 - 1.00 mg/dL Final    Sodium 12/21/2023 139  136 - 145 mmol/L Final    Potassium 12/21/2023 3.6  3.5 - 5.2 mmol/L Final    Chloride 12/21/2023 101  98 - 107 mmol/L Final    CO2 12/21/2023 25.8  22.0 - 29.0 mmol/L Final    Calcium 12/21/2023 9.5  8.6 - 10.5 mg/dL Final    Total Protein 12/21/2023 7.1  6.0 - 8.5 g/dL Final    Albumin 12/21/2023 4.1  3.5 - 5.2 g/dL Final    ALT (SGPT) 12/21/2023 11  1 - 33 U/L Final    AST (SGOT) 12/21/2023 13  1 - 32 U/L Final    Alkaline Phosphatase 12/21/2023 62  39 - 117 U/L Final    Total Bilirubin 12/21/2023 0.6  0.0 - 1.2 mg/dL Final    Globulin 12/21/2023 3.0  gm/dL Final    A/G Ratio 12/21/2023 1.4  g/dL Final    BUN/Creatinine Ratio 12/21/2023 15.0  7.0 - 25.0 Final    Anion Gap 12/21/2023 12.2  5.0 - 15.0 mmol/L Final    eGFR 12/21/2023 67.1  >60.0 mL/min/1.73 Final    Protime  12/21/2023 13.4  11.7 - 14.2 Seconds Final    INR 12/21/2023 1.01  0.90 - 1.10 Final    PTT 12/21/2023 30.0  22.7 - 35.4 seconds Final    MRSA Screen Cx 12/21/2023 No Methicillin Resistant Staphylococcus aureus isolated   Final    WBC 12/21/2023 10.58  3.40 - 10.80 10*3/mm3 Final    RBC 12/21/2023 3.83  3.77 - 5.28 10*6/mm3 Final    Hemoglobin 12/21/2023 12.9  12.0 - 15.9 g/dL Final    Hematocrit 12/21/2023 37.9  34.0 - 46.6 % Final    MCV 12/21/2023 99.0 (H)  79.0 - 97.0 fL Final    MCH 12/21/2023 33.7 (H)  26.6 - 33.0 pg Final    MCHC 12/21/2023 34.0  31.5 - 35.7 g/dL Final    RDW 12/21/2023 11.8 (L)  12.3 - 15.4 % Final    RDW-SD 12/21/2023 42.2  37.0 - 54.0 fl Final    MPV 12/21/2023 10.3  6.0 - 12.0 fL Final    Platelets 12/21/2023 325  140 - 450 10*3/mm3 Final    Neutrophil % 12/21/2023 68.6  42.7 - 76.0 % Final    Lymphocyte % 12/21/2023 22.0  19.6 - 45.3 % Final    Monocyte % 12/21/2023 7.8  5.0 - 12.0 % Final    Eosinophil % 12/21/2023 0.7  0.3 - 6.2 % Final    Basophil % 12/21/2023 0.5  0.0 - 1.5 % Final    Immature Grans % 12/21/2023 0.4  0.0 - 0.5 % Final    Neutrophils, Absolute 12/21/2023 7.27 (H)  1.70 - 7.00 10*3/mm3 Final    Lymphocytes, Absolute 12/21/2023 2.33  0.70 - 3.10 10*3/mm3 Final    Monocytes, Absolute 12/21/2023 0.82  0.10 - 0.90 10*3/mm3 Final    Eosinophils, Absolute 12/21/2023 0.07  0.00 - 0.40 10*3/mm3 Final    Basophils, Absolute 12/21/2023 0.05  0.00 - 0.20 10*3/mm3 Final    Immature Grans, Absolute 12/21/2023 0.04  0.00 - 0.05 10*3/mm3 Final    nRBC 12/21/2023 0.1  0.0 - 0.2 /100 WBC Final         Assessment & Plan   Problems Addressed this Visit    None  Visit Diagnoses       Generalized anxiety disorder  (Chronic)   -  Primary    Relevant Medications    sertraline (Zoloft) 100 MG tablet    Major depressive disorder, recurrent episode, moderate  (Chronic)       Relevant Medications    sertraline (Zoloft) 100 MG tablet          Diagnoses         Codes Comments    Generalized  anxiety disorder    -  Primary ICD-10-CM: F41.1  ICD-9-CM: 300.02     Major depressive disorder, recurrent episode, moderate     ICD-10-CM: F33.1  ICD-9-CM: 296.32             Visit Diagnoses:    ICD-10-CM ICD-9-CM   1. Generalized anxiety disorder  F41.1 300.02   2. Major depressive disorder, recurrent episode, moderate  F33.1 296.32          Rule out bipolar disorder, PTSD, and BPD    GOALS:  Short Term Goals: Patient will be compliant with medication, and patient will have no significant medication related side effects.  Patient will be engaged in psychotherapy as indicated.  Patient will report subjective improvement of symptoms.  Long term goals: To stabilize mood and treat/improve subjective symptoms, the patient will stay out of the hospital, the patient will be at an optimal level of functioning, and the patient will take all medications as prescribed.  The patient verbalized understanding and agreement with goals that were mutually set.      TREATMENT PLAN:   Continue supportive psychotherapy efforts and medications as indicated.   -Pt is prescribed Wellbutrin  mg PO Daily for smoking cessation from PCP  -Increase Zoloft to 100 mg PO Daily for anxiety and depression  -Abstain from ETOH  -Recommended pt try OTC magnesium glycinate for sleep  -Asked pt to call the [facility] to give verbal consent for her medical records from Dr. Khan at [facility].     *Discussed rosamaria/hypomania symptoms with pt. Advised pt to call this APRN if she begins to experience symptoms. Pt was agreeable and verbalized understanding.*    Medication and treatment options, both pharmacological and non-pharmacological treatment options, discussed during today's visit, including any off label use of medication. Patient acknowledged and verbally consented with current treatment plan and was educated on the importance of compliance with treatment and follow-up appointments.       MEDICATION ISSUES:    Discussed treatment  plan and medication options of prescribed medication as well as the risks, benefits, any black box warnings, and side effects including potential falls, possible impaired driving, and metabolic adversities among others, including any off label use of medication. Patient is agreeable to call the office with any worsening of symptoms or onset of side effects, or if any concerns or questions arise.  The contact information for the office is made available to the patient. They may call the [facility] at [phone]. Patient is agreeable to call 911 or go to the nearest ER should they begin having any SI/HI, or if any urgent concerns arise.        SUICIDE RISK ASSESSMENT: Unalterable demographics and a history of mental health intervention indicate this patient is in a high risk category compared to the general population. At present, the patient denies active SI/HI, intentions, or plans at this time and agrees to seek immediate care should such thoughts develop. The patient verbalizes understanding of how to access emergency care if needed and agrees to do so. Consideration of suicide risk and protective factors such as history, current presentation, individual strengths and weaknesses, psychosocial and environmental stressors and variables, psychiatric illness and symptoms, medical conditions and pain, took place in this interview. Based on those considerations, the patient is determined: within individual baseline and presenting no imminent risk for suicide or homicide. Other recommendations: The patient does not meet the criteria for inpatient admission and is not a safety risk to self or others at today's visit. Inpatient treatment offers no significant advantages over outpatient treatment for this patient at today's visit.      SAFETY PLAN:  Patient was given ample time for questions and fully participated in treatment planning.  Patient was encouraged to call the clinic with any  questions or concerns.  Patient was informed of access to emergency care. If patient were to develop any significant symptomatology, suicidal ideation, homicidal ideation, any concerns, or feel unsafe at any time they are to call the clinic and if unable to get immediate assistance should immediately call 911 or go to the nearest emergency room.  The patient is advised to remove or secure (lock away) all lethal weapons (including guns) and sharps (including razors, scissors, knives, etc.).  All medications (including any prescribed and any over the counter medications) should be stored in a safe and secured location that is not obtainable by children/adolescents.  Patient was given an opportunity and encouraged to ask questions about their medication, illness, and treatment. Patient contracted verbally for the following: If you are experiencing an emotional crisis or have thoughts of harming yourself or others, please go to your nearest local emergency room or call 911. Will continue to re-assess medication response and side effects frequently to establish efficacy and ensure safety. Risks, any black box warnings, side effects, off label usage, and benefits of medication and treatment discussed with patient, along with potential adverse side effects of current and/or newly prescribed medication, alternative treatment options, and OTC medications.  Patient verbalized understanding of potential risks, any off label use of medication, any black box warnings, and any side effects in their own words. The patient verbalized understanding and agreed to comply with the safety plan discussed in their own words. Patient given the number to the office. Number also available to the 24- hour suicide hotline.      MEDS ORDERED DURING VISIT:  New Medications Ordered This Visit   Medications    sertraline (Zoloft) 100 MG tablet     Sig: Take 1 tablet by mouth Daily.     Dispense:  30 tablet     Refill:  1       Return in about 4  weeks (around 1/25/2024), or if symptoms worsen or fail to improve, for Recheck.     Treatment plan completed: 11/14/23    Progress toward goal: Not at goal    Functional Status: Moderate impairment     Prognosis: Fair with Ongoing Treatment         This document has been electronically signed by CHRIS Gama  December 28, 2023 13:52 EST    Some of the data in this electronic note has been brought forward from a previous encounter, any necessary changes have been made, it has been reviewed by this APRN, and it is accurate.    Please note that portions of this note were completed with a voice recognition program. Efforts were made to edit dictation, but occasionally words are mistranscribed.

## 2024-01-04 ENCOUNTER — HOSPITAL ENCOUNTER (OUTPATIENT)
Dept: SLEEP MEDICINE | Facility: HOSPITAL | Age: 44
Discharge: HOME | End: 2024-01-04
Admitting: PSYCHIATRY & NEUROLOGY
Payer: COMMERCIAL

## 2024-01-04 DIAGNOSIS — G47.33 OSA (OBSTRUCTIVE SLEEP APNEA): ICD-10-CM

## 2024-01-04 DIAGNOSIS — G47.9 SLEEP DISTURBANCE: ICD-10-CM

## 2024-01-04 PROCEDURE — 95806 SLEEP STUDY UNATT&RESP EFFT: CPT

## 2024-01-08 DIAGNOSIS — G47.33 OSA (OBSTRUCTIVE SLEEP APNEA): Primary | ICD-10-CM

## 2024-01-08 PROCEDURE — 95806 SLEEP STUDY UNATT&RESP EFFT: CPT | Performed by: PSYCHIATRY & NEUROLOGY

## 2024-01-10 ENCOUNTER — TELEPHONE (OUTPATIENT)
Dept: SLEEP MEDICINE | Facility: HOSPITAL | Age: 44
End: 2024-01-10
Payer: COMMERCIAL

## 2024-01-10 NOTE — TELEPHONE ENCOUNTER
Spoke with pt about results and faxed order to [facility] for set up. Pt will schedule a f/u for compliance.

## 2024-01-25 ENCOUNTER — TELEMEDICINE (OUTPATIENT)
Dept: PSYCHIATRY | Facility: CLINIC | Age: 44
End: 2024-01-25
Payer: COMMERCIAL

## 2024-01-25 DIAGNOSIS — G47.9 SLEEP DIFFICULTIES: Chronic | ICD-10-CM

## 2024-01-25 DIAGNOSIS — F41.1 GENERALIZED ANXIETY DISORDER: Primary | Chronic | ICD-10-CM

## 2024-01-25 DIAGNOSIS — F33.1 MAJOR DEPRESSIVE DISORDER, RECURRENT EPISODE, MODERATE: Chronic | ICD-10-CM

## 2024-01-25 PROCEDURE — 99214 OFFICE O/P EST MOD 30 MIN: CPT | Performed by: NURSE PRACTITIONER

## 2024-01-25 RX ORDER — TRAZODONE HYDROCHLORIDE 50 MG/1
25-50 TABLET ORAL NIGHTLY PRN
Qty: 30 TABLET | Refills: 1 | Status: SHIPPED | OUTPATIENT
Start: 2024-01-25

## 2024-01-25 RX ORDER — SERTRALINE HYDROCHLORIDE 100 MG/1
150 TABLET, FILM COATED ORAL DAILY
Qty: 45 TABLET | Refills: 1 | Status: SHIPPED | OUTPATIENT
Start: 2024-01-25

## 2024-01-25 NOTE — PROGRESS NOTES
"This provider is completing this appointment through [facility] (through [facility]), [address], [place], [address] using a secure [employer] Video Visit through [employer]. Patient is being seen remotely via telehealth in [place], and stated they are in a secure environment for this session. The patient's condition being diagnosed/treated is appropriate for telemedicine. The provider identified herself as well as her credentials.   The patient, and/or patients guardian, consent to be seen remotely, and when consent is given they understand that the consent allows for patient identifiable information to be sent to a third party as needed.   They may refuse to be seen remotely at any time. The electronic data is encrypted and password protected, and the patient and/or guardian has been advised of the potential risks to privacy not withstanding such measures.    You have chosen to receive care through a telehealth visit.  Do you consent to use a video/audio connection for your medical care today? Yes    Patient identifiers utilized: Name and date of birth.        Subjective   Brittany Sheppard is a 43 y.o. female who presents today for follow up     Chief Complaint:  Depression, anxiety, sleep disturbance    Accompanied by: Pt was alone for duration of appointment    History of Present Illness:   Pt states she has been doing \"okay\" this past month. Pt states with the increase in Zoloft, she is more \"chill\". At work, pt has switched physicians and her current physician is new and building up her case load. While things are slow at work, she is helping the MA who is now assigned her old physician. Pt and her  are trying to get along. However, she finds it difficult when he tries to turn things into an argument. Without the Zoloft, they would fight and argue. Now she is able to stay calm and leave the situation. Depression \"comes and goes\"; it is more situational. Pt has 3-4 " "good days per week. On her bad days, she may stay in bed all day and \"stay in her head\". Her currently living situation along with her past history occupies in her mind. She also goes over worst case scenarios. Per pt, she has been an \"energy leech\". Pt feels the vibe at work has been off this week and it affects her. Pt recently had a sleep study and was found to have severe KENDRA; pt now has a CPAP she is utilizing. Pt is requesting a sleep aid at this time. Per pt, she had her lap band removed due to nausea. Pt is considering having gastric sleeve surgery this year. Pt reports drinking a pint of ETOH a week; encouraged pt to decrease and eventually abstain from use. The patient reports compliance with current medication regimen. The patient denies any current side effects from their current medication regimen. The patient denies any abnormal muscle movements or tics. The patient rates their depression on average in the past week at a 4-5/1010 on a 0-10 scale, with 10 being the worst. The patient rates their anxiety on average the past week at a 6-7/10 on a 0-10 scale, with 10 being the worst. The patient would like to increase their medications at this visit. The patient denies any suicidal or homicidal ideations, plans, or intent at today's encounter and is convincing. The patient denies any auditory hallucinations or visual hallucinations. The patient does not endorse any significant symptoms consistent with rosamaria or psychosis at today's encounter.     *If the patient has any concerns or needs assistance, they may call the [facility] at [phone]*        Prior Psychiatric Medications:  Wellbutrin XL - helped her quit smoking, initially thought it was making her angry  Abilify - insomnia  Atarax - helps with sleep onset  Elavil - diaphoresis, RLS  Seroquel - diaphoresis, gaining weight, prescribed for sleep  Effexor - diaphoresis  Cymbalta - diaphoresis  Celexa - some benefit; was " "on it for six months and stopped taking it because it made her \"numb\"  Ambien - \"crazy stuff\" at night time  Trazodone      The following portions of the patient's history were reviewed and updated as appropriate: allergies, current medications, past family history, past medical history, past social history, past surgical history and problem list.          Past Medical History:  Past Medical History:   Diagnosis Date    Acute recurrent frontal sinusitis 9/1/2022    Anemia     Anxiety     Arthritis     Asthma     Back pain     Bipolar 1 disorder     COPD (chronic obstructive pulmonary disease)     Depression     Dyspnea     Elevated liver enzymes     GERD (gastroesophageal reflux disease)     H. pylori infection     treated 8/2016    Hepatitis C 03/2015    harvoni completed; attributed to tatoo    Hirsutism     History of migraine headaches     with scotomata    HPV in female 04/23/2021    HPV in female 03/2014    HPV in female 10/2015    Hypertension     IBS (irritable bowel syndrome)     Insomnia     LGSIL on Pap smear of cervix 01/2016    cx biopsy confoirmed LGSIL    Obesity     PCOS (polycystic ovarian syndrome)     PCR positive for herpes simplex virus type 1 (HSV-1) DNA 2018    Preop cardiovascular exam 10/10/2014    Sleep apnea     Steatosis of liver     liver biopsy; moderate; stage 3, grade 0    Vitamin D deficiency        Social History:  Social History     Socioeconomic History    Marital status:    Tobacco Use    Smoking status: Former     Types: Cigarettes    Smokeless tobacco: Never   Vaping Use    Vaping Use: Never used   Substance and Sexual Activity    Alcohol use: Yes     Comment: occassionally    Drug use: Not Currently     Types: Marijuana    Sexual activity: Yes     Partners: Male, Female       Family History:  Family History   Problem Relation Age of Onset    Hypertension Mother     Obesity Mother     Bipolar disorder Father     Diabetes Father     Hypertension Father     Migraines " Father     Alcohol abuse Father     Stroke Maternal Grandfather     Cancer Maternal Grandfather     Stroke Maternal Grandmother     Breast cancer Maternal Grandmother 72    Diabetes Maternal Grandmother     Hypertension Maternal Grandmother     Stroke Paternal Grandmother     Breast cancer Paternal Grandmother     Hypertension Paternal Grandmother     Pancreatic cancer Paternal Grandmother     Schizophrenia Nephew     Bipolar disorder Nephew        Past Surgical History:  Past Surgical History:   Procedure Laterality Date    APPENDECTOMY  07/19/2010    [facility]    BREAST SURGERY      I & D of abcess X3    D & C HYSTEROSCOPY ENDOMETRIAL ABLATION  12/16/2010    menorrhagia; pathology benign    LAPAROSCOPIC GASTRIC BANDING  03/27/2017    Dr. Prieto [facility]    LIVER BIOPSY      PILONIDAL CYST / SINUS EXCISION  09/2008    POLYPECTOMY      TOOTH EXTRACTION  2016    WISDOM TOOTH EXTRACTION  2012       Problem List:  Patient Active Problem List   Diagnosis    Abscess of axilla, left    Lumbago    Acute hepatitis C without hepatic coma    Anxiety and depression    Asthma    Atopic contact dermatitis    Benign hypertension    Breast abscess of female    Cervical high risk HPV (human papillomavirus) test positive    Chondromalacia of right patella    Chronic pain of right knee    Gastroesophageal reflux disease    Hamstring tightness of right lower extremity    Gastric banding status    History of smoking    HSV-2 seropositive    Intractable chronic migraine without aura and without status migrainosus    LGSIL (low grade squamous intraepithelial dysplasia)    Morbid obesity    Morbid obesity with body mass index of 50.0-59.9 in adult    Patellofemoral syndrome of both knees    Pes anserinus bursitis of right knee    Primary osteoarthritis of both knees    Pruritic rash    Sepsis    Vitamin D deficiency    Osteoarthritis of multiple joints    Acute cystitis with hematuria    Generalized abdominal pain       Allergy:    Allergies   Allergen Reactions    Cephalexin Hives    Penicillins Anaphylaxis and Hives    Prunus Persica Anaphylaxis    Cefaclor Hives    Codeine Hives    Morphine Hives and Itching    Latex Hives and Rash     Hives and anaphlaxic          Current Medications:   Current Outpatient Medications   Medication Sig Dispense Refill    albuterol sulfate  (90 Base) MCG/ACT inhaler Inhale 2 puffs Every 4 (Four) Hours As Needed for Wheezing or Shortness of Air. 18 g 0    buPROPion XL (Wellbutrin XL) 300 MG 24 hr tablet Take 1 tablet by mouth Daily. 90 tablet 1    hydroCHLOROthiazide (HYDRODIURIL) 25 MG tablet Take 1 tablet by mouth daily. 90 tablet 1    Liraglutide (SAXENDA) 18 MG/3ML injection pen Inject 3 mg under the skin into the appropriate area as directed Daily. 15 mL 14    metoprolol tartrate (LOPRESSOR) 50 MG tablet Take 1 tablet by mouth 2 (Two) Times a Day. 180 tablet 1    sertraline (Zoloft) 100 MG tablet Take 1.5 tablets by mouth Daily. 45 tablet 1    traZODone (DESYREL) 50 MG tablet Take 0.5-1 tablets by mouth At Night As Needed for sleep 30 tablet 1    valACYclovir (Valtrex) 500 MG tablet Take 1 tablet by mouth 2 (Two) Times a Day. 90 tablet 2     No current facility-administered medications for this visit.       Review of Symptoms:    Review of Systems   Constitutional:  Positive for fatigue.   Psychiatric/Behavioral:  Positive for sleep disturbance and depressed mood. The patient is nervous/anxious.          Physical Exam:   Due to the remote nature of this encounter (virtual encounter), vitals were unable to be obtained.  Height stated at 64 inches.  Weight stated at 263-267 pounds.      Physical Exam  Neurological:      Mental Status: She is alert.   Psychiatric:         Attention and Perception: Attention and perception normal.         Mood and Affect: Mood is anxious and depressed.         Speech: Speech normal.         Behavior: Behavior normal. Behavior is cooperative.         Thought Content:  Thought content normal. Thought content is not paranoid or delusional. Thought content does not include homicidal or suicidal ideation. Thought content does not include homicidal or suicidal plan.         Cognition and Memory: Cognition and memory normal.      Comments: Restricted affect           Mental Status Exam:   Hygiene:   good  Cooperation:  Cooperative  Eye Contact:  Good  Psychomotor Behavior:  Appropriate  Affect:  Restricted  Mood: sad and anxious  Speech:  Normal  Thought Process:  Goal directed  Thought Content:  Mood congruent  Suicidal:  None  Homicidal:  None  Hallucinations:  None  Delusion:  None  Memory:  Intact  Orientation:  Person, Place, Time, and Situation  Reliability:  good  Insight:  Fair  Judgement:  Fair  Impulse Control:  Fair        PHQ-9 Depression Screening  Little interest or pleasure in doing things? (P) 1-->several days   Feeling down, depressed, or hopeless? (P) 1-->several days   Trouble falling or staying asleep, or sleeping too much? (P) 3-->nearly every day   Feeling tired or having little energy? (P) 1-->several days   Poor appetite or overeating? (P) 1-->several days   Feeling bad about yourself - or that you are a failure or have let yourself or your family down? (P) 0-->not at all   Trouble concentrating on things, such as reading the newspaper or watching television? (P) 1-->several days   Moving or speaking so slowly that other people could have noticed? Or the opposite - being so fidgety or restless that you have been moving around a lot more than usual? (P) 0-->not at all   Thoughts that you would be better off dead, or of hurting yourself in some way? (P) 0-->not at all   PHQ-9 Total Score (P) 8   If you checked off any problems, how difficult have these problems made it for you to do your work, take care of things at home, or get along with other people? (P) somewhat difficult     PHQ-9 Total Score: (P) 8      ALYX-7  Feeling nervous, anxious or on edge: (P) More  than half the days  Not being able to stop or control worrying: (P) Several days  Worrying too much about different things: (P) Several days  Trouble Relaxing: (P) More than half the days  Being so restless that it is hard to sit still: (P) Not at all  Feeling afraid as if something awful might happen: (P) Several days  Becoming easily annoyed or irritable: (P) Several days  ALYX 7 Total Score: (P) 8  If you checked any problems, how difficult have these problems made it for you to do your work, take care of things at home, or get along with other people: (P) Somewhat difficult    Previous Provider notes and available records reviewed by this APRN at today's encounter.         Lab Results:   No visits with results within 1 Month(s) from this visit.   Latest known visit with results is:   Hospital Outpatient Visit on 12/21/2023   Component Date Value Ref Range Status    QT Interval 12/21/2023 378  ms Final    QTC Interval 12/21/2023 423  ms Final         Assessment & Plan   Problems Addressed this Visit    None  Visit Diagnoses       Generalized anxiety disorder  (Chronic)   -  Primary    Relevant Medications    sertraline (Zoloft) 100 MG tablet    traZODone (DESYREL) 50 MG tablet    Major depressive disorder, recurrent episode, moderate  (Chronic)       Relevant Medications    sertraline (Zoloft) 100 MG tablet    traZODone (DESYREL) 50 MG tablet    Sleep difficulties  (Chronic)       Relevant Medications    traZODone (DESYREL) 50 MG tablet          Diagnoses         Codes Comments    Generalized anxiety disorder    -  Primary ICD-10-CM: F41.1  ICD-9-CM: 300.02     Major depressive disorder, recurrent episode, moderate     ICD-10-CM: F33.1  ICD-9-CM: 296.32     Sleep difficulties     ICD-10-CM: G47.9  ICD-9-CM: 780.50             Visit Diagnoses:    ICD-10-CM ICD-9-CM   1. Generalized anxiety disorder  F41.1 300.02   2. Major depressive disorder, recurrent episode, moderate  F33.1 296.32   3. Sleep difficulties  G47.9  780.50            Rule out bipolar disorder, PTSD, and BPD    GOALS:  Short Term Goals: Patient will be compliant with medication, and patient will have no significant medication related side effects.  Patient will be engaged in psychotherapy as indicated.  Patient will report subjective improvement of symptoms.  Long term goals: To stabilize mood and treat/improve subjective symptoms, the patient will stay out of the hospital, the patient will be at an optimal level of functioning, and the patient will take all medications as prescribed.  The patient verbalized understanding and agreement with goals that were mutually set.      TREATMENT PLAN:   Continue supportive psychotherapy efforts and medications as indicated.   -Pt is prescribed Wellbutrin  mg PO Daily for smoking cessation from PCP  -Increase Zoloft to 150 mg PO Daily for anxiety and depression  -Start Trazodone 25-50 mg PO QHS PRN for sleep  -Abstain from ETOH. Discussed with pt that combining ETOH with prescribed antidepressants can worsen depression in addition to causing drowsiness, nausea, dizziness, impaired motor control, and increasing risk of cardiovascular events. It is recommended that the patient avoid ETOH use while on psychotropic medications.     *Discussed rosamaria/hypomania symptoms with pt. Advised pt to call this APRN if she begins to experience symptoms. Pt was agreeable and verbalized understanding.*    Medication and treatment options, both pharmacological and non-pharmacological treatment options, discussed during today's visit, including any off label use of medication. Patient acknowledged and verbally consented with current treatment plan and was educated on the importance of compliance with treatment and follow-up appointments.       MEDICATION ISSUES:    Discussed treatment plan and medication options of prescribed medication as well as the risks, benefits, any black box warnings, and side effects including potential falls,  possible impaired driving, and metabolic adversities among others, including any off label use of medication. Patient is agreeable to call the office with any worsening of symptoms or onset of side effects, or if any concerns or questions arise.  The contact information for the office is made available to the patient. They may call the [facility] at [phone]. Patient is agreeable to call 911 or go to the nearest ER should they begin having any SI/HI, or if any urgent concerns arise.        SUICIDE RISK ASSESSMENT: Unalterable demographics and a history of mental health intervention indicate this patient is in a high risk category compared to the general population. At present, the patient denies active SI/HI, intentions, or plans at this time and agrees to seek immediate care should such thoughts develop. The patient verbalizes understanding of how to access emergency care if needed and agrees to do so. Consideration of suicide risk and protective factors such as history, current presentation, individual strengths and weaknesses, psychosocial and environmental stressors and variables, psychiatric illness and symptoms, medical conditions and pain, took place in this interview. Based on those considerations, the patient is determined: within individual baseline and presenting no imminent risk for suicide or homicide. Other recommendations: The patient does not meet the criteria for inpatient admission and is not a safety risk to self or others at today's visit. Inpatient treatment offers no significant advantages over outpatient treatment for this patient at today's visit.      SAFETY PLAN:  Patient was given ample time for questions and fully participated in treatment planning.  Patient was encouraged to call the clinic with any questions or concerns.  Patient was informed of access to emergency care. If patient were to develop any significant symptomatology, suicidal ideation,  homicidal ideation, any concerns, or feel unsafe at any time they are to call the clinic and if unable to get immediate assistance should immediately call 911 or go to the nearest emergency room.  The patient is advised to remove or secure (lock away) all lethal weapons (including guns) and sharps (including razors, scissors, knives, etc.).  All medications (including any prescribed and any over the counter medications) should be stored in a safe and secured location that is not obtainable by children/adolescents.  Patient was given an opportunity and encouraged to ask questions about their medication, illness, and treatment. Patient contracted verbally for the following: If you are experiencing an emotional crisis or have thoughts of harming yourself or others, please go to your nearest local emergency room or call 911. Will continue to re-assess medication response and side effects frequently to establish efficacy and ensure safety. Risks, any black box warnings, side effects, off label usage, and benefits of medication and treatment discussed with patient, along with potential adverse side effects of current and/or newly prescribed medication, alternative treatment options, and OTC medications.  Patient verbalized understanding of potential risks, any off label use of medication, any black box warnings, and any side effects in their own words. The patient verbalized understanding and agreed to comply with the safety plan discussed in their own words. Patient given the number to the office. Number also available to the 24- hour suicide hotline.      MEDS ORDERED DURING VISIT:  New Medications Ordered This Visit   Medications    sertraline (Zoloft) 100 MG tablet     Sig: Take 1.5 tablets by mouth Daily.     Dispense:  45 tablet     Refill:  1    traZODone (DESYREL) 50 MG tablet     Sig: Take 0.5-1 tablets by mouth At Night As Needed for sleep     Dispense:  30 tablet     Refill:  1       Return in about 4 weeks  (around 2/22/2024), or if symptoms worsen or fail to improve, for Recheck.     Treatment plan completed: 11/14/23    Progress toward goal: Not at goal    Functional Status: Moderate impairment     Prognosis: Fair with Ongoing Treatment         This document has been electronically signed by CHRIS Gama  January 25, 2024 14:50 EST    Some of the data in this electronic note has been brought forward from a previous encounter, any necessary changes have been made, it has been reviewed by this APRN, and it is accurate.    Please note that portions of this note were completed with a voice recognition program. Efforts were made to edit dictation, but occasionally words are mistranscribed.

## 2024-02-10 RX ORDER — HYDROCHLOROTHIAZIDE 25 MG/1
25 TABLET ORAL DAILY
Qty: 90 TABLET | Refills: 1 | Status: CANCELLED | OUTPATIENT
Start: 2024-02-10

## 2024-02-12 RX ORDER — HYDROCHLOROTHIAZIDE 25 MG/1
25 TABLET ORAL DAILY
Qty: 90 TABLET | Refills: 1 | Status: SHIPPED | OUTPATIENT
Start: 2024-02-12

## 2024-02-15 ENCOUNTER — OFFICE VISIT (OUTPATIENT)
Dept: INTERNAL MEDICINE | Age: 44
End: 2024-02-15
Payer: COMMERCIAL

## 2024-02-15 VITALS
HEART RATE: 80 BPM | BODY MASS INDEX: 47.12 KG/M2 | OXYGEN SATURATION: 99 % | TEMPERATURE: 97.6 F | SYSTOLIC BLOOD PRESSURE: 128 MMHG | HEIGHT: 64 IN | DIASTOLIC BLOOD PRESSURE: 76 MMHG | WEIGHT: 276 LBS

## 2024-02-15 DIAGNOSIS — R73.09 ELEVATED GLUCOSE: ICD-10-CM

## 2024-02-15 DIAGNOSIS — E66.01 CLASS 3 SEVERE OBESITY DUE TO EXCESS CALORIES WITH SERIOUS COMORBIDITY AND BODY MASS INDEX (BMI) OF 45.0 TO 49.9 IN ADULT: Primary | ICD-10-CM

## 2024-02-15 DIAGNOSIS — I10 BENIGN HYPERTENSION: ICD-10-CM

## 2024-02-15 DIAGNOSIS — L65.9 HAIR LOSS: ICD-10-CM

## 2024-02-15 PROCEDURE — 99214 OFFICE O/P EST MOD 30 MIN: CPT

## 2024-02-15 RX ORDER — VALACYCLOVIR HYDROCHLORIDE 500 MG/1
500 TABLET, FILM COATED ORAL 2 TIMES DAILY
Qty: 90 TABLET | Refills: 2 | Status: SHIPPED | OUTPATIENT
Start: 2024-02-15

## 2024-02-16 LAB
ALBUMIN SERPL-MCNC: 3.9 G/DL (ref 3.5–5.2)
ALBUMIN/GLOB SERPL: 1.6 G/DL
ALP SERPL-CCNC: 53 U/L (ref 39–117)
ALT SERPL-CCNC: 14 U/L (ref 1–33)
AST SERPL-CCNC: 27 U/L (ref 1–32)
BILIRUB SERPL-MCNC: <0.2 MG/DL (ref 0–1.2)
BUN SERPL-MCNC: 17 MG/DL (ref 6–20)
BUN/CREAT SERPL: 20.2 (ref 7–25)
CALCIUM SERPL-MCNC: 9.1 MG/DL (ref 8.6–10.5)
CHLORIDE SERPL-SCNC: 101 MMOL/L (ref 98–107)
CO2 SERPL-SCNC: 23.2 MMOL/L (ref 22–29)
CREAT SERPL-MCNC: 0.84 MG/DL (ref 0.57–1)
EGFRCR SERPLBLD CKD-EPI 2021: 89.7 ML/MIN/1.73
GLOBULIN SER CALC-MCNC: 2.4 GM/DL
GLUCOSE SERPL-MCNC: 95 MG/DL (ref 65–99)
HBA1C MFR BLD: 4.9 % (ref 4.8–5.6)
POTASSIUM SERPL-SCNC: 4.2 MMOL/L (ref 3.5–5.2)
PROT SERPL-MCNC: 6.3 G/DL (ref 6–8.5)
SODIUM SERPL-SCNC: 138 MMOL/L (ref 136–145)
T4 FREE SERPL-MCNC: 1.03 NG/DL (ref 0.93–1.7)
THYROPEROXIDASE AB SERPL-ACNC: <9 IU/ML (ref 0–34)
TSH SERPL DL<=0.005 MIU/L-ACNC: 1.2 UIU/ML (ref 0.27–4.2)

## 2024-02-22 ENCOUNTER — TELEMEDICINE (OUTPATIENT)
Dept: PSYCHIATRY | Facility: CLINIC | Age: 44
End: 2024-02-22
Payer: COMMERCIAL

## 2024-02-22 DIAGNOSIS — F33.1 MAJOR DEPRESSIVE DISORDER, RECURRENT EPISODE, MODERATE: Chronic | ICD-10-CM

## 2024-02-22 DIAGNOSIS — F41.1 GENERALIZED ANXIETY DISORDER: Primary | Chronic | ICD-10-CM

## 2024-02-22 DIAGNOSIS — G47.9 SLEEP DIFFICULTIES: Chronic | ICD-10-CM

## 2024-02-22 PROCEDURE — 99214 OFFICE O/P EST MOD 30 MIN: CPT | Performed by: NURSE PRACTITIONER

## 2024-02-22 NOTE — PROGRESS NOTES
"This provider is completing this appointment through [facility] (through [facility] [place] [facility]), [address], [place], [address] using a secure [employer] Video Visit through [employer]. Patient is being seen remotely via telehealth at their residence in [place], and stated they are in a secure environment for this session. The patient's condition being diagnosed/treated is appropriate for telemedicine. The provider identified herself as well as her credentials.   The patient, and/or patients guardian, consent to be seen remotely, and when consent is given they understand that the consent allows for patient identifiable information to be sent to a third party as needed.   They may refuse to be seen remotely at any time. The electronic data is encrypted and password protected, and the patient and/or guardian has been advised of the potential risks to privacy not withstanding such measures.    You have chosen to receive care through a telehealth visit.  Do you consent to use a video/audio connection for your medical care today? Yes    Patient identifiers utilized: Name and date of birth.        Subjective   Brittany Sheppard is a 43 y.o. female who presents today for follow up     Chief Complaint:  Depression, anxiety, sleep disturbance    Accompanied by: Pt was alone for duration of appointment    History of Present Illness:   Pt states four people in her family passed away last Friday, Saturday, and Sunday. Pt has multiple funerals to attend. Pt feels in shock with so many of them at the same time. Pt would describe her mood has been \"more chill\" and calm than her last appointment. Pt feels benefit from the Zoloft in comparison to how she was without it. Pt and her  aren't communicating much. Pt's work has been more enjoyable since she switched the provider she is working under. Pt continues to vomit daily despite no longer having the lap band or taking the Saxenda. Pt restarted the " "Saxenda due to it not being the cause of the vomiting. When pt remembers to take the Trazodone she sleeps well. She typically remembers during the week when she works. Pt is using her CPAP. Pt is drinking 1-2 pints throughout the week. Pt states it's a coping mechanism. Recommended pt get back into therapy. She plans to call to set up an appointment. The patient denies any new medical problems since last appointment with this facility. The patient reports compliance with current medication regimen. The patient denies any current side effects from their current medication regimen. The patient rates their depression on average in the past week at a 4/10 on a 0-10 scale, with 10 being the worst. The patient rates their anxiety on average the past week at a 5/10 on a 0-10 scale, with 10 being the worst. The patient would like to not adjust or change their medications at this visit. The patient denies any suicidal or homicidal ideations, plans, or intent at today's encounter and is convincing. The patient denies any auditory hallucinations or visual hallucinations. The patient does not endorse any significant symptoms consistent with rosamaria or psychosis at today's encounter.     *If the patient has any concerns or needs assistance, they may call the [facility] at [phone]*        Prior Psychiatric Medications:  Wellbutrin XL - helped her quit smoking, initially thought it was making her angry  Abilify - insomnia  Atarax - helps with sleep onset  Elavil - diaphoresis, RLS  Seroquel - diaphoresis, gaining weight, prescribed for sleep  Effexor - diaphoresis  Cymbalta - diaphoresis  Celexa - some benefit; was on it for six months and stopped taking it because it made her \"numb\"  Ambien - \"crazy stuff\" at night time  Trazodone      The following portions of the patient's history were reviewed and updated as appropriate: allergies, current medications, past family history, past medical history, " past social history, past surgical history and problem list.          Past Medical History:  Past Medical History:   Diagnosis Date    Acute recurrent frontal sinusitis 9/1/2022    Anemia     Anxiety     Arthritis     Asthma     Back pain     Bipolar 1 disorder     COPD (chronic obstructive pulmonary disease)     Depression     Dyspnea     Elevated liver enzymes     GERD (gastroesophageal reflux disease)     H. pylori infection     treated 8/2016    Hepatitis C 03/2015    harvoni completed; attributed to tatoo    Hirsutism     History of migraine headaches     with scotomata    HPV in female 04/23/2021    HPV in female 03/2014    HPV in female 10/2015    Hypertension     IBS (irritable bowel syndrome)     Insomnia     LGSIL on Pap smear of cervix 01/2016    cx biopsy confoirmed LGSIL    Obesity     PCOS (polycystic ovarian syndrome)     PCR positive for herpes simplex virus type 1 (HSV-1) DNA 2018    Preop cardiovascular exam 10/10/2014    Sleep apnea     Steatosis of liver     liver biopsy; moderate; stage 3, grade 0    Vitamin D deficiency        Social History:  Social History     Socioeconomic History    Marital status:    Tobacco Use    Smoking status: Former     Types: Cigarettes    Smokeless tobacco: Never   Vaping Use    Vaping Use: Never used   Substance and Sexual Activity    Alcohol use: Yes     Comment: occassionally    Drug use: Not Currently     Types: Marijuana    Sexual activity: Yes     Partners: Male, Female       Family History:  Family History   Problem Relation Age of Onset    Hypertension Mother     Obesity Mother     Bipolar disorder Father     Diabetes Father     Hypertension Father     Migraines Father     Alcohol abuse Father     Stroke Maternal Grandfather     Cancer Maternal Grandfather     Stroke Maternal Grandmother     Breast cancer Maternal Grandmother 72    Diabetes Maternal Grandmother     Hypertension Maternal Grandmother     Stroke Paternal Grandmother     Breast cancer  Paternal Grandmother     Hypertension Paternal Grandmother     Pancreatic cancer Paternal Grandmother     Schizophrenia Nephew     Bipolar disorder Nephew        Past Surgical History:  Past Surgical History:   Procedure Laterality Date    APPENDECTOMY  07/19/2010    [facility] [place]    BREAST SURGERY      [facility]    D & C HYSTEROSCOPY ENDOMETRIAL ABLATION  12/16/2010    menorrhagia; pathology benign    LAPAROSCOPIC GASTRIC BANDING  03/27/2017    Dr. Prieto [facility]    LIVER BIOPSY      PILONIDAL CYST / SINUS EXCISION  09/2008    POLYPECTOMY      TOOTH EXTRACTION  2016    WISDOM TOOTH EXTRACTION  2012       Problem List:  Patient Active Problem List   Diagnosis    Abscess of axilla, left    Lumbago    Acute hepatitis C without hepatic coma    Anxiety and depression    Asthma    Atopic contact dermatitis    Benign hypertension    Breast abscess of female    Cervical high risk HPV (human papillomavirus) test positive    Chondromalacia of right patella    Chronic pain of right knee    Gastroesophageal reflux disease    Hamstring tightness of right lower extremity    Gastric banding status    History of smoking    HSV-2 seropositive    Intractable chronic migraine without aura and without status migrainosus    LGSIL (low grade squamous intraepithelial dysplasia)    Morbid obesity    Morbid obesity with body mass index of 50.0-59.9 in adult    Patellofemoral syndrome of both knees    Pes anserinus bursitis of right knee    Primary osteoarthritis of both knees    Pruritic rash    Sepsis    Vitamin D deficiency    Osteoarthritis of multiple joints    Acute cystitis with hematuria    Generalized abdominal pain       Allergy:   Allergies   Allergen Reactions    Cephalexin Hives    Penicillins Anaphylaxis and Hives    Prunus Persica Anaphylaxis    Cefaclor Hives    Codeine Hives    Morphine Hives and Itching    Latex Hives and Rash     Hives and anaphlaxic          Current Medications:   Current Outpatient Medications    Medication Sig Dispense Refill    albuterol sulfate  (90 Base) MCG/ACT inhaler Inhale 2 puffs Every 4 (Four) Hours As Needed for Wheezing or Shortness of Air. 18 g 0    buPROPion XL (Wellbutrin XL) 300 MG 24 hr tablet Take 1 tablet by mouth Daily. 90 tablet 1    hydroCHLOROthiazide 25 MG tablet Take 1 tablet by mouth daily. 90 tablet 1    Liraglutide (SAXENDA) 18 MG/3ML injection pen Inject 0.6 mg under the skin into the appropriate area as directed Daily for 7 days, THEN 1.2 mg Daily for 7 days, THEN 1.8 mg Daily for 7 days, THEN 2.4 mg Daily for 26 days. 15 mL 0    metoprolol tartrate (LOPRESSOR) 50 MG tablet Take 1 tablet by mouth 2 (Two) Times a Day. 180 tablet 1    sertraline (Zoloft) 100 MG tablet Take 1.5 tablets by mouth Daily. 45 tablet 1    traZODone (DESYREL) 50 MG tablet Take 0.5-1 tablets by mouth At Night As Needed for sleep 30 tablet 1    valACYclovir (Valtrex) 500 MG tablet Take 1 tablet by mouth 2 (Two) Times a Day. 90 tablet 2     No current facility-administered medications for this visit.       Review of Symptoms:    Review of Systems   Constitutional:  Positive for fatigue.   Psychiatric/Behavioral:  Positive for sleep disturbance and stress.         Sadness related to recent deaths in the family         Physical Exam:   Due to the remote nature of this encounter (virtual encounter), vitals were unable to be obtained.  Height stated at 64 inches.  Weight stated at 276 pounds.      Physical Exam  Neurological:      Mental Status: She is alert.   Psychiatric:         Attention and Perception: Attention and perception normal.         Mood and Affect: Affect normal.         Speech: Speech normal.         Behavior: Behavior normal. Behavior is cooperative.         Thought Content: Thought content normal. Thought content is not paranoid or delusional. Thought content does not include homicidal or suicidal ideation. Thought content does not include homicidal or suicidal plan.         Cognition  and Memory: Cognition and memory normal.      Comments: Sadness related to recent deaths in the family           Mental Status Exam:   Hygiene:   good  Cooperation:  Cooperative  Eye Contact:  Good  Psychomotor Behavior:  Appropriate  Affect:  Appropriate  Mood: sad  Speech:  Normal  Thought Process:  Goal directed  Thought Content:  Mood congruent  Suicidal:  None  Homicidal:  None  Hallucinations:  None  Delusion:  None  Memory:  Intact  Orientation:  Person, Place, Time, and Situation  Reliability:  good  Insight:  Fair  Judgement:  Fair  Impulse Control:  Fair      PHQ-9 Depression Screening  Little interest or pleasure in doing things? (P) 1-->several days   Feeling down, depressed, or hopeless? (P) 1-->several days   Trouble falling or staying asleep, or sleeping too much? (P) 1-->several days   Feeling tired or having little energy? (P) 2-->more than half the days   Poor appetite or overeating? (P) 1-->several days   Feeling bad about yourself - or that you are a failure or have let yourself or your family down? (P) 0-->not at all   Trouble concentrating on things, such as reading the newspaper or watching television? (P) 0-->not at all   Moving or speaking so slowly that other people could have noticed? Or the opposite - being so fidgety or restless that you have been moving around a lot more than usual? (P) 0-->not at all   Thoughts that you would be better off dead, or of hurting yourself in some way? (P) 0-->not at all   PHQ-9 Total Score (P) 6   If you checked off any problems, how difficult have these problems made it for you to do your work, take care of things at home, or get along with other people? (P) somewhat difficult     PHQ-9 Total Score: (P) 6      ALYX-7  Feeling nervous, anxious or on edge: (P) Several days  Not being able to stop or control worrying: (P) Several days  Worrying too much about different things: (P) Not at all  Trouble Relaxing: (P) Several days  Being so restless that it is  hard to sit still: (P) Several days  Feeling afraid as if something awful might happen: (P) Not at all  Becoming easily annoyed or irritable: (P) Several days  ALYX 7 Total Score: (P) 5      Previous Provider notes and available records reviewed by this APRN at today's encounter.         Lab Results:   Office Visit on 02/15/2024   Component Date Value Ref Range Status    Glucose 02/15/2024 95  65 - 99 mg/dL Final    BUN 02/15/2024 17  6 - 20 mg/dL Final    Creatinine 02/15/2024 0.84  0.57 - 1.00 mg/dL Final    EGFR Result 02/15/2024 89.7  >60.0 mL/min/1.73 Final    Comment: GFR Normal >60  Chronic Kidney Disease <60  Kidney Failure <15      BUN/Creatinine Ratio 02/15/2024 20.2  7.0 - 25.0 Final    Sodium 02/15/2024 138  136 - 145 mmol/L Final    Potassium 02/15/2024 4.2  3.5 - 5.2 mmol/L Final    Chloride 02/15/2024 101  98 - 107 mmol/L Final    Total CO2 02/15/2024 23.2  22.0 - 29.0 mmol/L Final    Calcium 02/15/2024 9.1  8.6 - 10.5 mg/dL Final    Total Protein 02/15/2024 6.3  6.0 - 8.5 g/dL Final    Albumin 02/15/2024 3.9  3.5 - 5.2 g/dL Final    Globulin 02/15/2024 2.4  gm/dL Final    A/G Ratio 02/15/2024 1.6  g/dL Final    Total Bilirubin 02/15/2024 <0.2  0.0 - 1.2 mg/dL Final    Alkaline Phosphatase 02/15/2024 53  39 - 117 U/L Final    AST (SGOT) 02/15/2024 27  1 - 32 U/L Final    ALT (SGPT) 02/15/2024 14  1 - 33 U/L Final    Hemoglobin A1C 02/15/2024 4.90  4.80 - 5.60 % Final    Comment: Hemoglobin A1C Ranges:  Increased Risk for Diabetes  5.7% to 6.4%  Diabetes                     >= 6.5%  Diabetic Goal                < 7.0%      TSH 02/15/2024 1.200  0.270 - 4.200 uIU/mL Final    Free T4 02/15/2024 1.03  0.93 - 1.70 ng/dL Final    Results may be falsely increased if patient taking Biotin.    Thyroid Peroxidase Antibody 02/15/2024 <9  0 - 34 IU/mL Final         Assessment & Plan   Problems Addressed this Visit    None  Visit Diagnoses       Generalized anxiety disorder  (Chronic)   -  Primary    Major  depressive disorder, recurrent episode, moderate  (Chronic)       Sleep difficulties  (Chronic)             Diagnoses         Codes Comments    Generalized anxiety disorder    -  Primary ICD-10-CM: F41.1  ICD-9-CM: 300.02     Major depressive disorder, recurrent episode, moderate     ICD-10-CM: F33.1  ICD-9-CM: 296.32     Sleep difficulties     ICD-10-CM: G47.9  ICD-9-CM: 780.50             Visit Diagnoses:    ICD-10-CM ICD-9-CM   1. Generalized anxiety disorder  F41.1 300.02   2. Major depressive disorder, recurrent episode, moderate  F33.1 296.32   3. Sleep difficulties  G47.9 780.50          Rule out bipolar disorder, PTSD, and BPD    GOALS:  Short Term Goals: Patient will be compliant with medication, and patient will have no significant medication related side effects.  Patient will be engaged in psychotherapy as indicated.  Patient will report subjective improvement of symptoms.  Long term goals: To stabilize mood and treat/improve subjective symptoms, the patient will stay out of the hospital, the patient will be at an optimal level of functioning, and the patient will take all medications as prescribed.  The patient verbalized understanding and agreement with goals that were mutually set.      TREATMENT PLAN:   Continue supportive psychotherapy efforts and medications as indicated.   -Pt is prescribed Wellbutrin  mg PO Daily for smoking cessation from PCP  -Continue Zoloft 150 mg PO Daily for anxiety and depression  -Continue Trazodone 25-50 mg PO QHS PRN for sleep  -Abstain from ETOH. Discussed with pt that combining ETOH with prescribed antidepressants can worsen depression in addition to causing drowsiness, nausea, dizziness, impaired motor control, and increasing risk of cardiovascular events. It is recommended that the patient avoid ETOH use while on psychotropic medications.     *Discussed rosamaria/hypomania symptoms with pt. Advised pt to call this APRN if she begins to experience symptoms. Pt was  agreeable and verbalized understanding.*    Medication and treatment options, both pharmacological and non-pharmacological treatment options, discussed during today's visit, including any off label use of medication. Patient acknowledged and verbally consented with current treatment plan and was educated on the importance of compliance with treatment and follow-up appointments.       MEDICATION ISSUES:    Discussed treatment plan and medication options of prescribed medication as well as the risks, benefits, any black box warnings, and side effects including potential falls, possible impaired driving, and metabolic adversities among others, including any off label use of medication. Patient is agreeable to call the office with any worsening of symptoms or onset of side effects, or if any concerns or questions arise.  The contact information for the office is made available to the patient. They may call the [facility] at [phone]. Patient is agreeable to call 911 or go to the nearest ER should they begin having any SI/HI, or if any urgent concerns arise.        SUICIDE RISK ASSESSMENT: Unalterable demographics and a history of mental health intervention indicate this patient is in a high risk category compared to the general population. At present, the patient denies active SI/HI, intentions, or plans at this time and agrees to seek immediate care should such thoughts develop. The patient verbalizes understanding of how to access emergency care if needed and agrees to do so. Consideration of suicide risk and protective factors such as history, current presentation, individual strengths and weaknesses, psychosocial and environmental stressors and variables, psychiatric illness and symptoms, medical conditions and pain, took place in this interview. Based on those considerations, the patient is determined: within individual baseline and presenting no imminent risk for suicide or  homicide. Other recommendations: The patient does not meet the criteria for inpatient admission and is not a safety risk to self or others at today's visit. Inpatient treatment offers no significant advantages over outpatient treatment for this patient at today's visit.      SAFETY PLAN:  Patient was given ample time for questions and fully participated in treatment planning.  Patient was encouraged to call the clinic with any questions or concerns.  Patient was informed of access to emergency care. If patient were to develop any significant symptomatology, suicidal ideation, homicidal ideation, any concerns, or feel unsafe at any time they are to call the clinic and if unable to get immediate assistance should immediately call 911 or go to the nearest emergency room.  The patient is advised to remove or secure (lock away) all lethal weapons (including guns) and sharps (including razors, scissors, knives, etc.).  All medications (including any prescribed and any over the counter medications) should be stored in a safe and secured location that is not obtainable by children/adolescents.  Patient was given an opportunity and encouraged to ask questions about their medication, illness, and treatment. Patient contracted verbally for the following: If you are experiencing an emotional crisis or have thoughts of harming yourself or others, please go to your nearest local emergency room or call 911. Will continue to re-assess medication response and side effects frequently to establish efficacy and ensure safety. Risks, any black box warnings, side effects, off label usage, and benefits of medication and treatment discussed with patient, along with potential adverse side effects of current and/or newly prescribed medication, alternative treatment options, and OTC medications.  Patient verbalized understanding of potential risks, any off label use of medication, any black box warnings, and any side effects in their own  words. The patient verbalized understanding and agreed to comply with the safety plan discussed in their own words. Patient given the number to the office. Number also available to the 24- hour suicide hotline.      MEDS ORDERED DURING VISIT:  No orders of the defined types were placed in this encounter.      Return in about 11 weeks (around 5/9/2024), or if symptoms worsen or fail to improve, for Recheck.     Treatment plan completed: 11/14/23    Progress toward goal: Not at goal    Functional Status: Mild impairment     Prognosis: Fair with Ongoing Treatment         This document has been electronically signed by CHRIS Gama  February 22, 2024 14:53 EST    Some of the data in this electronic note has been brought forward from a previous encounter, any necessary changes have been made, it has been reviewed by this APRN, and it is accurate.    Please note that portions of this note were completed with a voice recognition program. Efforts were made to edit dictation, but occasionally words are mistranscribed.

## 2024-03-14 ENCOUNTER — OFFICE VISIT (OUTPATIENT)
Dept: SLEEP MEDICINE | Facility: HOSPITAL | Age: 44
End: 2024-03-14
Payer: COMMERCIAL

## 2024-03-14 VITALS
HEIGHT: 64 IN | HEART RATE: 68 BPM | SYSTOLIC BLOOD PRESSURE: 109 MMHG | DIASTOLIC BLOOD PRESSURE: 67 MMHG | OXYGEN SATURATION: 97 % | BODY MASS INDEX: 47.46 KG/M2 | WEIGHT: 278 LBS

## 2024-03-14 DIAGNOSIS — E66.01 CLASS 3 SEVERE OBESITY DUE TO EXCESS CALORIES WITH SERIOUS COMORBIDITY AND BODY MASS INDEX (BMI) OF 45.0 TO 49.9 IN ADULT: Primary | ICD-10-CM

## 2024-03-14 DIAGNOSIS — G47.33 OSA ON CPAP: ICD-10-CM

## 2024-03-14 PROCEDURE — G0463 HOSPITAL OUTPT CLINIC VISIT: HCPCS

## 2024-03-14 NOTE — PROGRESS NOTES
"Follow Up [facility] Note     Chief Complaint: Sleep apnea on CPAP    Primary Care Physician: Jane Buenrostro APRN    Interval History:   The patient is a 43 y.o. female  who returns for compliance download.  Her diagnostic sleep study revealed an AHI of over 42.  Her compliance download today from 2/11 through 3/11/2024 shows she is used CPAP 73% of the past 30 days.  AutoSet pressure spans 5 to 18 cm of water pressure she has changed her mask several times and she is now onto a nasal mask and this is the best one she thinks.    Downloaded PAP Data Evaluated For Therapeutic Response and Compliance:  DME is [employer].  Downloads between 2/11 to 3/11/2024.  Average usage is 4 hours and 12 minutes.  Average AHI is 2.5.  Average auto CPAP pressure is 10.0.    Review of Systems:    A complete review of systems was done and all were negative with the exception of difficulty with mask although now with the nasal mask seems quite happy.    Social History:    Social History     Socioeconomic History    Marital status:    Tobacco Use    Smoking status: Former     Types: Cigarettes    Smokeless tobacco: Never   Vaping Use    Vaping status: Never Used   Substance and Sexual Activity    Alcohol use: Yes     Comment: occassionally    Drug use: Not Currently     Types: Marijuana    Sexual activity: Yes     Partners: Male, Female       Allergies:  Cephalexin, Penicillins, Prunus persica, Cefaclor, Codeine, Morphine, and Latex     Medication Review:  Reviewed.      Vital Signs:    Vitals:    03/14/24 1300   BP: 109/67   Pulse: 68   SpO2: 97%   Weight: 126 kg (278 lb)   Height: 162.6 cm (64.02\")     Body mass index is 47.69 kg/m².    Physical Exam:    Constitutional:  Well developed 43 y.o. female that appears in no apparent distress.  Awake & oriented times 3.  Normal mood with normal recent and remote memory and normal judgement.  Eyes:  Conjunctivae normal.      Self-administered Alden Sleepiness Scale " test results:  6  0-5 Lower normal daytime sleepiness  6-10 Higher normal daytime sleepiness  11-12 Mild, 13-15 Moderate, & 16-24 Severe excessive daytime sleepiness       I have reviewed the above results and compared them with the patient's last downloads and reviewed with the patient.    Impression:     Encounter Diagnoses   Name Primary?    Class 3 severe obesity due to excess calories with serious comorbidity and body mass index (BMI) of 45.0 to 49.9 in adult Yes    KENDRA on CPAP      Should continue with her present mask and settings.    Plan:  Good sleep hygiene measures should be maintained.  Weight loss would be beneficial in this patient who has obesity class III by Body mass index is 47.69 kg/m².      After evaluating the patient and assessing results available, the patient is benefiting from the treatment being provided.     The patient will continue auto CPAP.  Potential side effects of CPAP therapy reviewed and addressed as needed.  After clinical evaluation and review of downloads, I recommend no changes to the patient's pressures.      I answered all of the patient's questions.  The patient will call the [facility] for any problems and will follow up in 1 year.      Domenico Martínez MD  Sleep Medicine  03/14/24  14:51 EDT

## 2024-04-11 DIAGNOSIS — F41.1 GENERALIZED ANXIETY DISORDER: Chronic | ICD-10-CM

## 2024-04-11 DIAGNOSIS — F33.1 MAJOR DEPRESSIVE DISORDER, RECURRENT EPISODE, MODERATE: Chronic | ICD-10-CM

## 2024-04-11 DIAGNOSIS — F31.32 BIPOLAR 1 DISORDER, DEPRESSED, MODERATE: ICD-10-CM

## 2024-04-12 RX ORDER — BUPROPION HYDROCHLORIDE 300 MG/1
300 TABLET ORAL DAILY
Qty: 90 TABLET | Refills: 1 | Status: SHIPPED | OUTPATIENT
Start: 2024-04-12

## 2024-04-15 RX ORDER — SERTRALINE HYDROCHLORIDE 100 MG/1
150 TABLET, FILM COATED ORAL DAILY
Qty: 45 TABLET | Refills: 1 | Status: SHIPPED | OUTPATIENT
Start: 2024-04-15

## 2024-04-17 ENCOUNTER — TELEPHONE (OUTPATIENT)
Dept: INTERNAL MEDICINE | Age: 44
End: 2024-04-17

## 2024-04-17 NOTE — TELEPHONE ENCOUNTER
Caller: Brittany Sheppard    Relationship: Self    Best call back number: [phone]    What orders are you requesting (i.e. lab or imaging): ULTRASOUND ON NECK    In what timeframe would the patient need to come in: ASAP    Additional notes: PATIENT STATED SHE PREVIOUSLY HAD A MASS ON HER NECK AND HAD A VISIT WITH WILIAN MAGAÑA FOR IT.    SHE STATED SHE NOW HAS 2 ON HER NECK, BOTH ARE SORE.    SHE WOULD LIKE TO KNOW IF WILIAN MAGAÑA COULD PUT ANOTHER ORDER IN FOR AN ULTRASOUND.    PLEASE CALL.

## 2024-04-17 NOTE — TELEPHONE ENCOUNTER
Please call patient.    Since she is having a new problem, recommend scheduling an appointment in the office for evaluation and new order.

## 2024-04-18 ENCOUNTER — OFFICE VISIT (OUTPATIENT)
Dept: INTERNAL MEDICINE | Age: 44
End: 2024-04-18
Payer: COMMERCIAL

## 2024-04-18 VITALS
HEART RATE: 70 BPM | OXYGEN SATURATION: 98 % | TEMPERATURE: 97.3 F | DIASTOLIC BLOOD PRESSURE: 82 MMHG | BODY MASS INDEX: 46.26 KG/M2 | WEIGHT: 271 LBS | SYSTOLIC BLOOD PRESSURE: 128 MMHG | HEIGHT: 64 IN

## 2024-04-18 DIAGNOSIS — K64.9 HEMORRHOIDS, UNSPECIFIED HEMORRHOID TYPE: ICD-10-CM

## 2024-04-18 DIAGNOSIS — R49.9 CHANGE IN VOICE: ICD-10-CM

## 2024-04-18 DIAGNOSIS — R59.0 CERVICAL ADENOPATHY: Primary | ICD-10-CM

## 2024-04-18 PROCEDURE — 99214 OFFICE O/P EST MOD 30 MIN: CPT

## 2024-04-18 RX ORDER — HYDROXYCHLOROQUINE SULFATE 200 MG/1
TABLET, FILM COATED ORAL EVERY 24 HOURS
COMMUNITY
Start: 2024-03-28 | End: 2024-04-18

## 2024-04-18 NOTE — PROGRESS NOTES
"    I N T E R N A L  M E D I C I N E  Jane Porter, APRN    ENCOUNTER DATE:  04/18/2024    Brittany Sheppard / 43 y.o. / female      CHIEF COMPLAINT / REASON FOR OFFICE VISIT     Neck Pain (Mass left) and Hemorrhoids      ASSESSMENT & PLAN     Diagnoses and all orders for this visit:    1. Cervical adenopathy (Primary)  -     Ambulatory Referral to ENT (Otolaryngology)  -     US Head Neck Soft Tissue    2. Change in voice  -     Ambulatory Referral to ENT (Otolaryngology)    3. Hemorrhoids, unspecified hemorrhoid type  -     Ambulatory Referral to Colorectal Surgery         SUMMARY/DISCUSSION  Update US to examine changes to left anterior lymph node swelling.  Referral to ENT given voice changes.  For constipation/ possible hemorrhoids, she declined rectal exam.  Discussed importance of fiber supplementation, good hydration with water at all times.  Consider stool softener PRN.  Agreeable to undergo further evaluation with colorectal.  Referral placed.        Next Appointment with me: 6/20/2024    Return for Next scheduled follow up.      VITAL SIGNS     Visit Vitals  /82   Pulse 70   Temp 97.3 °F (36.3 °C)   Ht 162.6 cm (64.02\")   Wt 123 kg (271 lb)   LMP 03/23/2024 (Exact Date)   SpO2 98%   BMI 46.49 kg/m²             Wt Readings from Last 3 Encounters:   04/18/24 123 kg (271 lb)   03/14/24 126 kg (278 lb)   02/15/24 125 kg (276 lb)     Body mass index is 46.49 kg/m².        MEDICATIONS AT THE TIME OF OFFICE VISIT     Current Outpatient Medications on File Prior to Visit   Medication Sig Dispense Refill    albuterol sulfate  (90 Base) MCG/ACT inhaler Inhale 2 puffs Every 4 (Four) Hours As Needed for Wheezing or Shortness of Air. 18 g 0    buPROPion XL (Wellbutrin XL) 300 MG 24 hr tablet Take 1 tablet by mouth Daily. 90 tablet 1    hydroCHLOROthiazide 25 MG tablet Take 1 tablet by mouth daily. 90 tablet 1    metoprolol tartrate (LOPRESSOR) 50 MG tablet Take 1 tablet by mouth 2 (Two) Times a Day. 180 tablet " "1    sertraline (Zoloft) 100 MG tablet Take 1.5 tablets by mouth Daily. 45 tablet 1    traZODone (DESYREL) 50 MG tablet Take 0.5-1 tablets by mouth At Night As Needed for sleep 30 tablet 1    valACYclovir (Valtrex) 500 MG tablet Take 1 tablet by mouth 2 (Two) Times a Day. 90 tablet 2    hydroxychloroquine (Plaquenil) 200 MG tablet Take  by mouth Daily. (Patient not taking: Reported on 4/18/2024)       No current facility-administered medications on file prior to visit.        HISTORY OF PRESENT ILLNESS     Reports persistent left submandibular/ anterior neck lymph node swelling for at least last year.  Noticed a recent change to area in the last weeks; now can palpate a small \"divot\" in the middle of the swollen node.  Non painful.  Has noticed that voice appears more hoarse as of late.  She does frequently sing at Karaoke.  No recent URI, dysphagia, chest pain, dyspnea, fever, chills. Plans to update with dentist in near future.      May 2023 US Head Neck showed normal size, normal appearing lymph nodes in the posterior neck areas of concerns, measuring up to 5 mm in short axis diameter on the left.    Reports chronic episodes of scant amount of bright red blood on toilet tissue with wiping.  Expresses concern for possible hemorrhoids as rectal area is painful at times.  Used prep H without benefit.        Patient Care Team:  Jane Buenrostro APRN as PCP - General (Family Medicine)    REVIEW OF SYSTEMS     Review of Systems   Constitutional:  Negative for chills, fever and unexpected weight change.   HENT:  Positive for voice change.    Respiratory:  Negative for cough, chest tightness and shortness of breath.    Cardiovascular:  Negative for chest pain, palpitations and leg swelling.   Gastrointestinal:  Positive for anal bleeding, constipation and rectal pain.   Neurological:  Negative for dizziness, weakness, light-headedness and headaches.   Hematological:  Positive for adenopathy.   Psychiatric/Behavioral:  The " patient is not nervous/anxious.           PHYSICAL EXAMINATION     Physical Exam  Vitals reviewed.   Constitutional:       General: She is not in acute distress.     Appearance: Normal appearance. She is not ill-appearing, toxic-appearing or diaphoretic.   HENT:      Head: Normocephalic and atraumatic.      Mouth/Throat:      Mouth: Mucous membranes are moist.      Pharynx: Oropharynx is clear. No oropharyngeal exudate or posterior oropharyngeal erythema.   Cardiovascular:      Rate and Rhythm: Normal rate and regular rhythm.      Heart sounds: Normal heart sounds.   Pulmonary:      Effort: Pulmonary effort is normal.      Breath sounds: Normal breath sounds.   Genitourinary:     Comments: Declined rectal exam  Musculoskeletal:      Right lower leg: No edema.      Left lower leg: No edema.   Lymphadenopathy:      Cervical: Cervical adenopathy (Left anterior/ submandibular single lymph node swelling, approximately 1 cm) present.   Neurological:      Mental Status: She is alert and oriented to person, place, and time. Mental status is at baseline.   Psychiatric:         Mood and Affect: Mood normal.         Behavior: Behavior normal.         Thought Content: Thought content normal.         Judgment: Judgment normal.           REVIEWED DATA     Labs:           Imaging:            Medical Tests:           Summary of old records / correspondence / consultant report:           Request outside records:

## 2024-04-25 ENCOUNTER — HOSPITAL ENCOUNTER (OUTPATIENT)
Facility: HOSPITAL | Age: 44
Discharge: HOME | End: 2024-04-25
Payer: COMMERCIAL

## 2024-04-25 PROCEDURE — 76536 US EXAM OF HEAD AND NECK: CPT

## 2024-05-02 DIAGNOSIS — R59.0 CERVICAL LYMPHADENOPATHY: Primary | ICD-10-CM

## 2024-05-09 DIAGNOSIS — I10 BENIGN HYPERTENSION: ICD-10-CM

## 2024-05-10 RX ORDER — METOPROLOL TARTRATE 50 MG/1
50 TABLET, FILM COATED ORAL 2 TIMES DAILY
Qty: 180 TABLET | Refills: 0 | Status: SHIPPED | OUTPATIENT
Start: 2024-05-10 | End: 2024-08-08

## 2024-05-22 ENCOUNTER — TELEPHONE (OUTPATIENT)
Dept: PSYCHIATRY | Facility: CLINIC | Age: 44
End: 2024-05-22
Payer: COMMERCIAL

## 2024-05-22 NOTE — TELEPHONE ENCOUNTER
If the patient is requesting detox specifically, the recommendation is for the patient to go to the closest emergency department for a medical evaluation due to the life-threatening risks of alcohol withdrawal symptoms.  If the patient is looking at rehabs specifically and other options for detox she could consider [facility] on [address] [phone], [facility] inpatient alcohol detox [address] [phone], or [facility] [address] [phone] or [phone].  I am not sure if these facilities accept her insurance, but there are some inpatient [facility] detox facilities available such as [facility] or [facility] also.

## 2024-05-22 NOTE — TELEPHONE ENCOUNTER
Spoke with  Mima Nickerson and was instructed to email Michelle French the  in [place].  She states unfortunately they do not have an in patient detox program.  The only one they know of is the [facility][note 3] in [place].  I was instructed by both office managers to have patient present at a [facility] ER location for evaluation and then further steps could be taken from there.    called patient to make her aware but did not receive and answer.  Left patient a voicemail as well as sent patient a my chart message.

## 2024-05-22 NOTE — TELEPHONE ENCOUNTER
Please check with Saima, the patient , as I do not know how [facility]'s inpatient works, if it is like [facility] it would be through the emergency department.

## 2024-05-22 NOTE — TELEPHONE ENCOUNTER
Patient doesn't mind driving to [facility] for detox.  Patient would like to know how she goes about getting in there.  Please advise.

## 2024-05-22 NOTE — TELEPHONE ENCOUNTER
Patient would like know how to get into a detox program for alcohol.  Patient states she is a [employer] and would like to stay within the [employer] because of copays and such.  Patient was made aware her provider would not be in office until next week.  Patient states she can't wait until next week and would like to know what next step she needs to take.  Please advise.

## 2024-06-06 ENCOUNTER — TELEMEDICINE (OUTPATIENT)
Dept: PSYCHIATRY | Facility: CLINIC | Age: 44
End: 2024-06-06
Payer: COMMERCIAL

## 2024-06-06 DIAGNOSIS — F41.1 GENERALIZED ANXIETY DISORDER: Primary | Chronic | ICD-10-CM

## 2024-06-06 DIAGNOSIS — G47.9 SLEEP DIFFICULTIES: Chronic | ICD-10-CM

## 2024-06-06 DIAGNOSIS — F33.1 MAJOR DEPRESSIVE DISORDER, RECURRENT EPISODE, MODERATE: Chronic | ICD-10-CM

## 2024-06-06 DIAGNOSIS — F10.20 MODERATE ALCOHOL USE DISORDER: ICD-10-CM

## 2024-06-06 PROCEDURE — 99214 OFFICE O/P EST MOD 30 MIN: CPT | Performed by: NURSE PRACTITIONER

## 2024-06-06 RX ORDER — TRAZODONE HYDROCHLORIDE 100 MG/1
100 TABLET ORAL NIGHTLY
Qty: 30 TABLET | Refills: 1 | Status: SHIPPED | OUTPATIENT
Start: 2024-06-06

## 2024-06-06 RX ORDER — BUSPIRONE HYDROCHLORIDE 10 MG/1
TABLET ORAL 2 TIMES DAILY
COMMUNITY
Start: 2024-06-03

## 2024-06-06 RX ORDER — HYDROXYZINE PAMOATE 50 MG/1
CAPSULE ORAL
COMMUNITY
Start: 2024-06-03

## 2024-06-06 RX ORDER — ACAMPROSATE CALCIUM 333 MG/1
TABLET, DELAYED RELEASE ORAL 3 TIMES DAILY
COMMUNITY
Start: 2024-05-28

## 2024-06-06 NOTE — PROGRESS NOTES
This provider is completing this appointment through [facility] (through [facility]), [address], [place], [address] using a secure [employer] Video Visit through [employer]. Patient is being seen remotely via telehealth in her vehicle in [place], and stated they are in a secure environment for this session. The patient's condition being diagnosed/treated is appropriate for telemedicine. The provider identified herself as well as her credentials.   The patient, and/or patients guardian, consent to be seen remotely, and when consent is given they understand that the consent allows for patient identifiable information to be sent to a third party as needed. They may refuse to be seen remotely at any time. The electronic data is encrypted and password protected, and the patient and/or guardian has been advised of the potential risks to privacy not withstanding such measures.    You have chosen to receive care through a telehealth visit.  Do you consent to use a video/audio connection for your medical care today? Yes    Patient identifiers utilized: Name and date of birth.        Subjective   Brittany Sheppard is a 43 y.o. female who presents today for follow up     Chief Complaint:  Depression, anxiety, ETOH use, sleep disturbance    Accompanied by: Pt was alone for duration of appointment    History of Present Illness:   Pt was last seen by this APRN on 2/22/24.  Pt reports she voluntarily checked herself into [facility] in [place]. Pt reports she had been drinking a pint of ETOH per day and did not feel she would stop without intervention. Pt states she was there for 12 days and was discharged yesterday. Pt reports prior to having herself admitted for detox, she was anxious and depressed. She reports having experienced panic attacks. Pt mentions her job as one of her triggers. Pt states that if she did experience any withdrawal effects, then she was on too  much medication from the facility to notice. Pt has been sober for 13 days.     Per pt, she is currently taking the following medications from the facility:  Buspar 10 mg PO BID  Campral 333 mg PO TID  Vistaril  mg PO QID PRN  Trazodone 100 mg PO QHS PRN  Zoloft 150 mg PO Daily  Wellbutrin  mg PO QAM for smoking cessation from PCP    Pt reports she had found the clonidine 0.1 mg PO BID they were prescribing her beneficial, but it lowered her BP while she was also taking metoprolol. Therefore, the provider at the facility discontinued it. Pt would like to talk to her PCP about changing the metoprolol to clonidine, so it can help her BP and anxiety. Pt admits that she was feeling paranoid, restless, and did curse at others while in detox. Pt does not wish to come off the Wellbutrin XL because it helps with smoking cessation. She states the Vistaril and Trazodone are helping her rest. She is using her CPAP. Pt has an appointment with [facility] next Tuesday for IOP. Discussed with pt that they should be the ones to make adjustments in her medications until she is discharged. Pt currently feels her job is being threatened. Pt states she will need FMLA paperwork completed again as the one from [facility] ends on the 18th. Discussed with pt that Ming Molina should be the ones to complete it from the time they start seeing her until they plan discharge. Afterwards, this APRN would complete intermittent FMLA if needed. Pt verbalized understanding. The patient denies any suicidal or homicidal ideations, plans, or intent at today's encounter and is convincing. The patient denies any auditory hallucinations or visual hallucinations. The patient does not endorse any significant symptoms consistent with rosamaria or psychosis at today's encounter.     *If the patient has any concerns or needs assistance, they may call the [facility] at [phone]*        Prior Psychiatric  "Medications:  Wellbutrin XL - helped her quit smoking, initially thought it was making her angry  Abilify - insomnia  Atarax - helps with sleep onset  Elavil - diaphoresis, RLS  Seroquel - diaphoresis, gaining weight, prescribed for sleep  Effexor - diaphoresis  Cymbalta - diaphoresis  Celexa - some benefit; was on it for six months and stopped taking it because it made her \"numb\"  Ambien - \"crazy stuff\" at night time  Trazodone      The following portions of the patient's history were reviewed and updated as appropriate: allergies, current medications, past family history, past medical history, past social history, past surgical history and problem list.          Past Medical History:  Past Medical History:   Diagnosis Date    Acute recurrent frontal sinusitis 9/1/2022    Anemia     Anxiety     Arthritis     Asthma     Back pain     Bipolar 1 disorder     COPD (chronic obstructive pulmonary disease)     Depression     Dyspnea     Elevated liver enzymes     GERD (gastroesophageal reflux disease)     H. pylori infection     treated 8/2016    Hepatitis C 03/2015    harvoni completed; attributed to tatoo    Hirsutism     History of migraine headaches     with scotomata    HPV in female 04/23/2021    HPV in female 03/2014    HPV in female 10/2015    Hypertension     IBS (irritable bowel syndrome)     Insomnia     LGSIL on Pap smear of cervix 01/2016    cx biopsy confoirmed LGSIL    Obesity     PCOS (polycystic ovarian syndrome)     PCR positive for herpes simplex virus type 1 (HSV-1) DNA 2018    Preop cardiovascular exam 10/10/2014    Sleep apnea     Steatosis of liver     liver biopsy; moderate; stage 3, grade 0    Vitamin D deficiency        Social History:  Social History     Socioeconomic History    Marital status:    Tobacco Use    Smoking status: Former     Types: Cigarettes    Smokeless tobacco: Never   Vaping Use    Vaping status: Never Used   Substance and Sexual Activity    Alcohol use: Yes     Comment: " occassionally    Drug use: Not Currently     Types: Marijuana    Sexual activity: Yes     Partners: Male, Female       Family History:  Family History   Problem Relation Age of Onset    Hypertension Mother     Obesity Mother     Bipolar disorder Father     Diabetes Father     Hypertension Father     Migraines Father     Alcohol abuse Father     Stroke Maternal Grandfather     Cancer Maternal Grandfather     Stroke Maternal Grandmother     Breast cancer Maternal Grandmother 72    Diabetes Maternal Grandmother     Hypertension Maternal Grandmother     Stroke Paternal Grandmother     Breast cancer Paternal Grandmother     Hypertension Paternal Grandmother     Pancreatic cancer Paternal Grandmother     Schizophrenia Nephew     Bipolar disorder Nephew        Past Surgical History:  Past Surgical History:   Procedure Laterality Date    APPENDECTOMY  07/19/2010    [facility] [place]    BREAST SURGERY      I & D of abcess X3    D & C HYSTEROSCOPY ENDOMETRIAL ABLATION  12/16/2010    menorrhagia; pathology benign    LAPAROSCOPIC GASTRIC BANDING  03/27/2017    Dr. Prieto [facility]    LIVER BIOPSY      PILONIDAL CYST / SINUS EXCISION  09/2008    POLYPECTOMY      TOOTH EXTRACTION  2016    WISDOM TOOTH EXTRACTION  2012       Problem List:  Patient Active Problem List   Diagnosis    Abscess of axilla, left    Lumbago    Acute hepatitis C without hepatic coma    Anxiety and depression    Asthma    Atopic contact dermatitis    Benign hypertension    Breast abscess of female    Cervical high risk HPV (human papillomavirus) test positive    Chondromalacia of right patella    Chronic pain of right knee    Gastroesophageal reflux disease    Hamstring tightness of right lower extremity    Gastric banding status    History of smoking    HSV-2 seropositive    Intractable chronic migraine without aura and without status migrainosus    LGSIL (low grade squamous intraepithelial dysplasia)    Morbid obesity    Morbid obesity with body mass index of  50.0-59.9 in adult    Patellofemoral syndrome of both knees    Pes anserinus bursitis of right knee    Primary osteoarthritis of both knees    Pruritic rash    Sepsis    Vitamin D deficiency    Osteoarthritis of multiple joints    Acute cystitis with hematuria    Generalized abdominal pain    KENDRA on CPAP       Allergy:   Allergies   Allergen Reactions    Cephalexin Hives    Penicillins Anaphylaxis and Hives    Prunus Persica Anaphylaxis    Cefaclor Hives    Codeine Hives    Morphine Hives and Itching    Latex Hives and Rash     Hives and anaphlaxic          Current Medications:   Current Outpatient Medications   Medication Sig Dispense Refill    acamprosate (CAMPRAL) 333 MG EC tablet 3 (Three) Times a Day.      busPIRone (BUSPAR) 10 MG tablet Take  by mouth 2 (Two) Times a Day.      hydrOXYzine pamoate (VISTARIL) 50 MG capsule Take 1-2 capsules PO QID PRN for anxiety      albuterol sulfate  (90 Base) MCG/ACT inhaler Inhale 2 puffs Every 4 (Four) Hours As Needed for Wheezing or Shortness of Air. 18 g 0    buPROPion XL (Wellbutrin XL) 300 MG 24 hr tablet Take 1 tablet by mouth Daily. 90 tablet 1    metoprolol tartrate (LOPRESSOR) 50 MG tablet Take 1 tablet by mouth 2 (Two) Times a Day. 180 tablet 0    sertraline (Zoloft) 100 MG tablet Take 1.5 tablets by mouth Daily. 45 tablet 1    traZODone (DESYREL) 100 MG tablet Take 1 tablet by mouth Every Night. 30 tablet 1    valACYclovir (Valtrex) 500 MG tablet Take 1 tablet by mouth 2 (Two) Times a Day. 90 tablet 2     No current facility-administered medications for this visit.       Review of Symptoms:    Review of Systems   Constitutional:  Positive for fatigue.   Psychiatric/Behavioral:  Positive for decreased concentration, depressed mood and stress. The patient is nervous/anxious.          Physical Exam:   Due to the remote nature of this encounter (virtual encounter), vitals were unable to be obtained.  Height stated at 64 inches.  Weight stated at 276  pounds.      Physical Exam  Neurological:      Mental Status: She is alert.   Psychiatric:         Attention and Perception: Attention and perception normal. She does not perceive auditory or visual hallucinations.         Mood and Affect: Affect normal.         Speech: Speech normal.         Behavior: Behavior is cooperative.         Thought Content: Thought content is not paranoid or delusional. Thought content does not include homicidal or suicidal ideation. Thought content does not include homicidal or suicidal plan.         Cognition and Memory: Cognition and memory normal.      Comments: Mood congruent thought content. Pt was mildly sad and anxious about work. Pt reports being committed to recovery           Mental Status Exam:   Hygiene:   good  Cooperation:  Cooperative  Eye Contact:  Good  Psychomotor Behavior:  Appropriate  Affect:  Appropriate  Mood:  Mildly sad & anxious about work  Speech:  Normal  Thought Process:  Goal directed and Linear  Thought Content:  Mood congruent  Suicidal:  None  Homicidal:  None  Hallucinations:  None  Delusion:  None  Memory:  Intact  Orientation:  Person, Place, Time, and Situation  Reliability:  fair  Insight:  Fair  Judgement:  Fair  Impulse Control:  Fair        Patient Health Questionnaire-9 (PHQ-9) (Depression Screening Tool)  Little interest or pleasure in doing things? (P) 3-->nearly every day   Feeling down, depressed, or hopeless? (P) 2-->more than half the days   Trouble falling or staying asleep, or sleeping too much? (P) 3-->nearly every day   Feeling tired or having little energy? (P) 2-->more than half the days   Poor appetite or overeating? (P) 1-->several days   Feeling bad about yourself - or that you are a failure or have let yourself or your family down? (P) 1-->several days   Trouble concentrating on things, such as reading the newspaper or watching television? (P) 3-->nearly every day   Moving or speaking so slowly that other people could have  noticed? Or the opposite - being so fidgety or restless that you have been moving around a lot more than usual? (P) 1-->several days   Thoughts that you would be better off dead, or of hurting yourself in some way? (P) 0-->not at all   PHQ-9 Total Score (P) 16   If you checked off any problems, how difficult have these problems made it for you to do your work, take care of things at home, or get along with other people? (P) very difficult     PHQ-9 Total Score: (P) 16      Generalized Anxiety Disorder 7-Item (ALYX-7) Screening Tool  Feeling nervous, anxious or on edge: (P) Nearly every day  Not being able to stop or control worrying: (P) More than half the days  Worrying too much about different things: (P) More than half the days  Trouble Relaxing: (P) Nearly every day  Being so restless that it is hard to sit still: (P) More than half the days  Feeling afraid as if something awful might happen: (P) Several days  Becoming easily annoyed or irritable: (P) Nearly every day  ALYX 7 Total Score: (P) 16  If you checked any problems, how difficult have these problems made it for you to do your work, take care of things at home, or get along with other people: (P) Very difficult    Previous Provider notes and available records reviewed by this APRN at today's encounter.         Lab Results:   No visits with results within 1 Month(s) from this visit.   Latest known visit with results is:   Office Visit on 02/15/2024   Component Date Value Ref Range Status    Glucose 02/15/2024 95  65 - 99 mg/dL Final    BUN 02/15/2024 17  6 - 20 mg/dL Final    Creatinine 02/15/2024 0.84  0.57 - 1.00 mg/dL Final    EGFR Result 02/15/2024 89.7  >60.0 mL/min/1.73 Final    Comment: GFR Normal >60  Chronic Kidney Disease <60  Kidney Failure <15      BUN/Creatinine Ratio 02/15/2024 20.2  7.0 - 25.0 Final    Sodium 02/15/2024 138  136 - 145 mmol/L Final    Potassium 02/15/2024 4.2  3.5 - 5.2 mmol/L Final    Chloride 02/15/2024 101  98 - 107 mmol/L  Final    Total CO2 02/15/2024 23.2  22.0 - 29.0 mmol/L Final    Calcium 02/15/2024 9.1  8.6 - 10.5 mg/dL Final    Total Protein 02/15/2024 6.3  6.0 - 8.5 g/dL Final    Albumin 02/15/2024 3.9  3.5 - 5.2 g/dL Final    Globulin 02/15/2024 2.4  gm/dL Final    A/G Ratio 02/15/2024 1.6  g/dL Final    Total Bilirubin 02/15/2024 <0.2  0.0 - 1.2 mg/dL Final    Alkaline Phosphatase 02/15/2024 53  39 - 117 U/L Final    AST (SGOT) 02/15/2024 27  1 - 32 U/L Final    ALT (SGPT) 02/15/2024 14  1 - 33 U/L Final    Hemoglobin A1C 02/15/2024 4.90  4.80 - 5.60 % Final    Comment: Hemoglobin A1C Ranges:  Increased Risk for Diabetes  5.7% to 6.4%  Diabetes                     >= 6.5%  Diabetic Goal                < 7.0%      TSH 02/15/2024 1.200  0.270 - 4.200 uIU/mL Final    Free T4 02/15/2024 1.03  0.93 - 1.70 ng/dL Final    Results may be falsely increased if patient taking Biotin.    Thyroid Peroxidase Antibody 02/15/2024 <9  0 - 34 IU/mL Final         Assessment & Plan   Problems Addressed this Visit    None  Visit Diagnoses       Generalized anxiety disorder  (Chronic)   -  Primary    Relevant Medications    acamprosate (CAMPRAL) 333 MG EC tablet    busPIRone (BUSPAR) 10 MG tablet    hydrOXYzine pamoate (VISTARIL) 50 MG capsule    traZODone (DESYREL) 100 MG tablet    Major depressive disorder, recurrent episode, moderate  (Chronic)       Relevant Medications    acamprosate (CAMPRAL) 333 MG EC tablet    busPIRone (BUSPAR) 10 MG tablet    hydrOXYzine pamoate (VISTARIL) 50 MG capsule    traZODone (DESYREL) 100 MG tablet    Moderate alcohol use disorder        Sleep difficulties  (Chronic)             Diagnoses         Codes Comments    Generalized anxiety disorder    -  Primary ICD-10-CM: F41.1  ICD-9-CM: 300.02     Major depressive disorder, recurrent episode, moderate     ICD-10-CM: F33.1  ICD-9-CM: 296.32     Moderate alcohol use disorder     ICD-10-CM: F10.20  ICD-9-CM: 303.90     Sleep difficulties     ICD-10-CM:  G47.9  ICD-9-CM: 780.50             Visit Diagnoses:    ICD-10-CM ICD-9-CM   1. Generalized anxiety disorder  F41.1 300.02   2. Major depressive disorder, recurrent episode, moderate  F33.1 296.32   3. Moderate alcohol use disorder  F10.20 303.90   4. Sleep difficulties  G47.9 780.50          Rule out bipolar disorder, PTSD, and BPD    GOALS:  Short Term Goals: Patient will be compliant with medication, and patient will have no significant medication related side effects.  Patient will be engaged in psychotherapy as indicated. Patient will report subjective improvement of symptoms.  Long term goals: To stabilize mood and treat/improve subjective symptoms, the patient will stay out of the hospital, the patient will be at an optimal level of functioning, and the patient will take all medications as prescribed.  The patient verbalized understanding and agreement with goals that were mutually set.      TREATMENT PLAN: Continue supportive psychotherapy efforts and medications as indicated.  Medication and treatment options, both pharmacological and non-pharmacological treatment options, discussed during today's visit, including any off label use of medication. Patient acknowledged and verbally consented with current treatment plan and was educated on the importance of compliance with treatment and follow-up appointments.      Per pt, she is currently taking the following medications from the facility:  Buspar 10 mg PO BID for anxiety  Campral 333 mg PO TID for ETOH abstinence  Vistaril  mg PO QID PRN for anxiety/sleep  Trazodone 100 mg PO QHS PRN for sleep  Zoloft 150 mg PO Daily for depression and anxiety  Wellbutrin  mg PO QAM for smoking cessation from PCP    -Discussed with pt that combining ETOH with prescribed antidepressants can worsen depression in addition to causing drowsiness, nausea, dizziness, impaired motor control, and increasing risk of cardiovascular events. The combination has the potential  to be fatal. Therefore, it is highly recommended that the patient avoid ETOH use while on psychotropic medications.    *Pt to follow up with [facility] for medication management. Pt may choose to return to this APRN following discharge. She can make a follow up appointment at that time.*      MEDICATION ISSUES:  Discussed treatment plan and medication options of prescribed medication as well as the risks, benefits, any black box warnings, and side effects including potential falls, possible impaired driving, and metabolic adversities among others, including any off label use of medication. Patient is agreeable to call the office with any worsening of symptoms or onset of side effects, or if any concerns or questions arise.  The contact information for the office is made available to the patient. Patient is agreeable to call 911 or go to the nearest ER should they begin having any SI/HI, or if any urgent concerns arise.       VERBAL INFORMED CONSENT FOR MEDICATION:  The patient was educated that their proposed/prescribed psychotropic medication(s) has potential risks, side effects, adverse effects, and black box warnings; and these have been discussed with the patient.  The patient has been informed that their treatment and medication dosage is to be individualized, and may even be above or below the recommended range/dosage due to patient individualization and response, but medication is prescribed using a shared decision making approach, and no medication or dosage will be prescribed without the patient's verbal consent.  The reason for the use of the medication including any off label use and alternative modes of treatment other than or in addition to medication has been considered and discussed, the probable consequences of not receiving the proposed treatment have been discussed, and any treatment side effects, black box warnings, and cautions associated with treatment have been discussed with the patient.   The patient is allowed ample time to openly discuss and ask questions regarding the proposed medication(s) and treatment plan and the patient verbalizes understanding the reasons for the use of the medication, its potential risks and benefits, other alternative treatment(s), and the probable consequences that may occur if the proposed medication is not given.  The patient has been given ample time to ask questions and study the information and find the information to be specific, accurate, and complete.  The patient gives verbal consent for the medication(s) proposed/prescribed, they verbalized understanding that they can refuse and withdraw consent at any time with the assistance of this APRN, and the patient has verbally confirmed that they are aware, and are willing, to take the prescribed medication and follow the treatment plan with the known possible risks, side effect, black box warnings, and any potential medication interactions, and the patient reports they will be worse off without this medication and treatment plan.  The patient is advised to contact this APRN/this office if any questions or concerns arise at any time (at [phone]), or call 911/go to the closest emergency department if needed or outside of office hours.      SUICIDE RISK ASSESSMENT AND SAFETY PLAN: Unalterable demographics and a history of mental health intervention indicate this patient is in a high risk category compared to the general population. At present, the patient denies active SI/HI, intentions, or plans at this time and agrees to seek immediate care should such thoughts develop. The patient verbalizes understanding of how to access emergency care if needed and agrees to do so. Consideration of suicide risk and protective factors such as history, current presentation, individual strengths and weaknesses, psychosocial and environmental stressors and variables, psychiatric illness and symptoms, medical conditions and pain,  took place in this interview. Based on those considerations, the patient is determined: within individual baseline and presenting no imminent risk for suicide or homicide. Other recommendations: The patient does not meet the criteria for inpatient admission and is not a safety risk to self or others at today's visit. Inpatient treatment offers no significant advantages over outpatient treatment for this patient at today's visit.  The patient was given ample time for questions and fully participated in treatment planning.  The patient was encouraged to call the clinic with any questions or concerns.  The patient was informed of access to emergency care. If patient were to develop any significant symptomatology, suicidal ideation, homicidal ideation, any concerns, or feel unsafe at any time they are to call the clinic and if unable to get immediate assistance should immediately call 911 or go to the nearest emergency room.  Patient contracted verbally for the following: If you are experiencing an emotional crisis or have thoughts of harming yourself or others, please go to your nearest local emergency room or call 911. Will continue to re-assess medication response and side effects frequently to establish efficacy and ensure safety. Risks, any black box warnings, side effects, off label usage, and benefits of medication and treatment discussed with patient, along with potential adverse side effects of current and/or newly prescribed medication, alternative treatment options, and OTC medications.  Patient verbalized understanding of potential risks, any off label use of medication, any black box warnings, and any side effects in their own words. The patient verbalized understanding and agreed to comply with the safety plan discussed in their own words.  Patient given the number to the office. Number also discussed of the 24- hour suicide hotline.       MEDS ORDERED DURING VISIT:  New Medications Ordered This Visit    Medications    traZODone (DESYREL) 100 MG tablet     Sig: Take 1 tablet by mouth Every Night.     Dispense:  30 tablet     Refill:  1       Return if symptoms worsen or fail to improve, for Recheck.     Treatment plan completed: 11/14/23    Progress toward goal: Not at goal    Functional Status: Moderate impairment     Prognosis: Fair with Ongoing Treatment         This document has been electronically signed by CHRIS Gama  June 6, 2024 17:02 EDT    Some of the data in this electronic note has been brought forward from a previous encounter, any necessary changes have been made, it has been reviewed by this APRN, and it is accurate.    Please note that portions of this note were completed with a voice recognition program. Efforts were made to edit dictation, but occasionally words are mistranscribed.

## 2024-06-07 ENCOUNTER — TELEPHONE (OUTPATIENT)
Dept: INTERNAL MEDICINE | Age: 44
End: 2024-06-07
Payer: COMMERCIAL

## 2024-06-12 ENCOUNTER — TELEMEDICINE (OUTPATIENT)
Dept: PSYCHIATRY | Facility: CLINIC | Age: 44
End: 2024-06-12
Payer: COMMERCIAL

## 2024-06-12 ENCOUNTER — TELEPHONE (OUTPATIENT)
Dept: PSYCHIATRY | Facility: CLINIC | Age: 44
End: 2024-06-12

## 2024-06-12 DIAGNOSIS — F33.1 MAJOR DEPRESSIVE DISORDER, RECURRENT EPISODE, MODERATE: Chronic | ICD-10-CM

## 2024-06-12 DIAGNOSIS — F41.1 GENERALIZED ANXIETY DISORDER: Primary | Chronic | ICD-10-CM

## 2024-06-12 DIAGNOSIS — G47.9 SLEEP DIFFICULTIES: Chronic | ICD-10-CM

## 2024-06-12 DIAGNOSIS — F10.20 MODERATE ALCOHOL USE DISORDER: Chronic | ICD-10-CM

## 2024-06-12 PROCEDURE — 99214 OFFICE O/P EST MOD 30 MIN: CPT | Performed by: NURSE PRACTITIONER

## 2024-06-12 RX ORDER — BUPROPION HYDROCHLORIDE 150 MG/1
150 TABLET ORAL
Qty: 5 TABLET | Refills: 0 | Status: SHIPPED | OUTPATIENT
Start: 2024-06-12 | End: 2024-06-17

## 2024-06-12 RX ORDER — TRAZODONE HYDROCHLORIDE 100 MG/1
100-200 TABLET ORAL NIGHTLY PRN
Qty: 60 TABLET | Refills: 1 | Status: SHIPPED | OUTPATIENT
Start: 2024-06-12

## 2024-06-12 RX ORDER — SERTRALINE HYDROCHLORIDE 100 MG/1
150 TABLET, FILM COATED ORAL DAILY
Qty: 45 TABLET | Refills: 1 | Status: SHIPPED | OUTPATIENT
Start: 2024-06-12

## 2024-06-12 NOTE — PROGRESS NOTES
This provider is completing this appointment through [facility] (through [facility]), [address], [facility], [address] using a secure [employer] Video Visit through [employer]. Patient is being seen remotely via telehealth at their residence in [place], and stated they are in a secure environment for this session. The patient's condition being diagnosed/treated is appropriate for telemedicine. The provider identified herself as well as her credentials. The patient, and/or patients guardian, consent to be seen remotely, and when consent is given they understand that the consent allows for patient identifiable information to be sent to a third party as needed. They may refuse to be seen remotely at any time. The electronic data is encrypted and password protected, and the patient and/or guardian has been advised of the potential risks to privacy not withstanding such measures.    You have chosen to receive care through a telehealth visit.  Do you consent to use a video/audio connection for your medical care today? Yes    Patient identifiers utilized: Name and date of birth.        Subjective   Brittany Sheppard is a 43 y.o. female who presents today for follow up     Chief Complaint:  Depression, anxiety, ETOH use, sleep disturbance    Accompanied by: Pt was alone for duration of appointment    History of Present Illness:   Pt was last seen by this APRN on 6/6/24.  Pt states she went to her evaluation at [facility] for IOP yesterday and decided against the program. Per pt, they told her that they will not manage her medications since she already sees this APRN. Pt states they also would not do any type of therapy since she had previously established with a therapist. They recommended she make an appointment with her therapist. Pt reports that she stopped taking Campral over the weekend due to GI side effects and dry mouth. Pt also discontinued the Buspar. Pt has had alcohol  cravings, but has resisted. She reports driving by liquor stores is triggering for her. Discussed with pt that she may need another medication to assist with cravings to prevent relapse. Pt does not wish to be on any type of medication to assist with addiction at this time. Pt is attending AA and has a sponsor. Pt feels she could have ADHD versus a mood disorder. This APRN discussed with pt that she may need a higher level of care for more definitive diagnoses and addiction medicine. Pt was receptive to this but would like this APRN to adjust medications at this time. Pt states she is maintaining sobriety. Pt is currently averaging 3-4 hours of sleep on the Trazodone and the Vistaril. Pt uses her CPAP at night, but doesn't always wake up with it on. Pt wants to taper off any medication that may be causing dry mouth. Pt admits that the Wellbutrin XL helps with smoking cessation, but could also be causing agitation. Pt is willing to discontinue to improve her overall mood. Pt would like to reduce as many medications as possible. Pt did voice frustration because she needs [employer] to release her to go back to work on the 18th. Pt has not been able to get in contact with anyone to fill out the forms. This APRN is not able to complete it as she was not the one to initially place pt on [facility]; it needs to come from [employer]. Pt verbalized understanding and plans to try and get in contact with them again. The patient denies any new medical problems since last appointment with this facility. The patient reports compliance with current medication regimen. The patient denies any current side effects from their current medication regimen. The patient denies any abnormal muscle movements or tics. The patient rates their depression on average in the past week at a 7/10 on a 0-10 scale, with 10 being the worst. The patient rates their anxiety on average the past week at a 7-8/10 on a 0-10 scale,  "with 10 being the worst. The patient rates their irritability on average the past week at an 8/10 on a 0-10 scale, with 10 being the worst. The patient would like to adjust their medications at this visit. The patient denies any suicidal or homicidal ideations, plans, or intent at today's encounter and is convincing. The patient denies any auditory hallucinations or visual hallucinations. The patient does not endorse any significant symptoms consistent with rosamaria or psychosis at today's encounter.     *If the patient has any concerns or needs assistance, they may call the [facility] at [phone]*      Prior Psychiatric Medications:  Wellbutrin XL - helped her quit smoking, initially thought it was making her angry  Abilify - insomnia  Atarax - helps with sleep onset  Elavil - diaphoresis, RLS  Seroquel - diaphoresis, gaining weight, prescribed for sleep  Effexor - diaphoresis  Cymbalta - diaphoresis  Celexa - some benefit; was on it for six months and stopped taking it because it made her \"numb\"  Ambien - \"crazy stuff\" at night time  Trazodone  Campral - GI side effects, dry mouth  Buspar - did not notice a difference on 10 mg PO BID        The following portions of the patient's history were reviewed and updated as appropriate: allergies, current medications, past family history, past medical history, past social history, past surgical history and problem list.          Past Medical History:  Past Medical History:   Diagnosis Date    Acute recurrent frontal sinusitis 9/1/2022    Anemia     Anxiety     Arthritis     Asthma     Back pain     Bipolar 1 disorder     COPD (chronic obstructive pulmonary disease)     Depression     Dyspnea     Elevated liver enzymes     GERD (gastroesophageal reflux disease)     H. pylori infection     treated 8/2016    Hepatitis C 03/2015    harvoni completed; attributed to tatoo    Hirsutism     History of migraine headaches     with scotomata    HPV in " female 04/23/2021    HPV in female 03/2014    HPV in female 10/2015    Hypertension     IBS (irritable bowel syndrome)     Insomnia     LGSIL on Pap smear of cervix 01/2016    cx biopsy confoirmed LGSIL    Obesity     PCOS (polycystic ovarian syndrome)     PCR positive for herpes simplex virus type 1 (HSV-1) DNA 2018    Preop cardiovascular exam 10/10/2014    Sleep apnea     Steatosis of liver     liver biopsy; moderate; stage 3, grade 0    Vitamin D deficiency        Social History:  Social History     Socioeconomic History    Marital status:    Tobacco Use    Smoking status: Former     Types: Cigarettes    Smokeless tobacco: Never   Vaping Use    Vaping status: Never Used   Substance and Sexual Activity    Alcohol use: Yes     Comment: occassionally    Drug use: Not Currently     Types: Marijuana    Sexual activity: Yes     Partners: Male, Female       Family History:  Family History   Problem Relation Age of Onset    Hypertension Mother     Obesity Mother     Bipolar disorder Father     Diabetes Father     Hypertension Father     Migraines Father     Alcohol abuse Father     Stroke Maternal Grandfather     Cancer Maternal Grandfather     Stroke Maternal Grandmother     Breast cancer Maternal Grandmother 72    Diabetes Maternal Grandmother     Hypertension Maternal Grandmother     Stroke Paternal Grandmother     Breast cancer Paternal Grandmother     Hypertension Paternal Grandmother     Pancreatic cancer Paternal Grandmother     Schizophrenia Nephew     Bipolar disorder Nephew        Past Surgical History:  Past Surgical History:   Procedure Laterality Date    APPENDECTOMY  07/19/2010    [facility] [place]    BREAST SURGERY      I & D of abcess X3    D & C HYSTEROSCOPY ENDOMETRIAL ABLATION  12/16/2010    menorrhagia; pathology benign    LAPAROSCOPIC GASTRIC BANDING  03/27/2017    Dr. Prieto Colon [facility]    LIVER BIOPSY      PILONIDAL CYST / SINUS EXCISION  09/2008    POLYPECTOMY      TOOTH EXTRACTION  2016    WISDOM  TOOTH EXTRACTION  2012       Problem List:  Patient Active Problem List   Diagnosis    Abscess of axilla, left    Lumbago    Acute hepatitis C without hepatic coma    Anxiety and depression    Asthma    Atopic contact dermatitis    Benign hypertension    Breast abscess of female    Cervical high risk HPV (human papillomavirus) test positive    Chondromalacia of right patella    Chronic pain of right knee    Gastroesophageal reflux disease    Hamstring tightness of right lower extremity    Gastric banding status    History of smoking    HSV-2 seropositive    Intractable chronic migraine without aura and without status migrainosus    LGSIL (low grade squamous intraepithelial dysplasia)    Morbid obesity    Morbid obesity with body mass index of 50.0-59.9 in adult    Patellofemoral syndrome of both knees    Pes anserinus bursitis of right knee    Primary osteoarthritis of both knees    Pruritic rash    Sepsis    Vitamin D deficiency    Osteoarthritis of multiple joints    Acute cystitis with hematuria    Generalized abdominal pain    KENDRA on CPAP       Allergy:   Allergies   Allergen Reactions    Cephalexin Hives    Penicillins Anaphylaxis and Hives    Prunus Persica Anaphylaxis    Cefaclor Hives    Codeine Hives    Morphine Hives and Itching    Latex Hives and Rash     Hives and anaphlaxic          Current Medications:   Current Outpatient Medications   Medication Sig Dispense Refill    sertraline (Zoloft) 100 MG tablet Take 1.5 tablets by mouth Daily. 45 tablet 1    traZODone (DESYREL) 100 MG tablet Take 1-2 tablets by mouth At Night As Needed for sleep 60 tablet 1    albuterol sulfate  (90 Base) MCG/ACT inhaler Inhale 2 puffs Every 4 (Four) Hours As Needed for Wheezing or Shortness of Air. 18 g 0    buPROPion XL (Wellbutrin XL) 150 MG 24 hr tablet Take 1 tablet by mouth Every Morning for 5 days , then discontinue 5 tablet 0    Cariprazine HCl (Vraylar) 1.5 MG capsule capsule Take 1 capsule by mouth Daily. 30  capsule 1    metoprolol tartrate (LOPRESSOR) 50 MG tablet Take 1 tablet by mouth 2 (Two) Times a Day. 180 tablet 0    valACYclovir (Valtrex) 500 MG tablet Take 1 tablet by mouth 2 (Two) Times a Day. 90 tablet 2     No current facility-administered medications for this visit.       Review of Symptoms:    Review of Systems   Constitutional:  Positive for appetite change and fatigue.   Psychiatric/Behavioral:  Positive for agitation, decreased concentration, dysphoric mood, sleep disturbance, depressed mood and stress. The patient is nervous/anxious.          Physical Exam:   Due to the remote nature of this encounter (virtual encounter), vitals were unable to be obtained.  Height stated at 64 inches.  Weight stated at 270 pounds.      Physical Exam  Neurological:      Mental Status: She is alert.   Psychiatric:         Attention and Perception: Attention and perception normal.         Mood and Affect: Affect normal. Mood is anxious and depressed.         Speech: Speech normal.         Behavior: Behavior normal. Behavior is cooperative.         Thought Content: Thought content is not paranoid or delusional. Thought content does not include homicidal or suicidal ideation. Thought content does not include homicidal or suicidal plan.         Cognition and Memory: Cognition and memory normal.         Judgment: Judgment is impulsive.           Mental Status Exam:   Hygiene:   good  Cooperation:  Cooperative  Eye Contact:  Good  Psychomotor Behavior:  Appropriate  Affect:  Appropriate  Mood: sad and anxious  Speech:  Normal  Thought Process:  Linear  Thought Content:  Mood congruent  Suicidal:  None  Homicidal:  None  Hallucinations:  None  Delusion:  None  Memory:  Intact  Orientation:  Person, Place, Time, and Situation  Reliability:  fair  Insight:  Fair  Judgement:  Fair  Impulse Control:  Fair        Patient Health Questionnaire-9 (PHQ-9) (Depression Screening Tool)  Little interest or pleasure in doing things? (P)  3-->nearly every day   Feeling down, depressed, or hopeless? (P) 2-->more than half the days   Trouble falling or staying asleep, or sleeping too much? (P) 2-->more than half the days   Feeling tired or having little energy? (P) 2-->more than half the days   Poor appetite or overeating? (P) 2-->more than half the days   Feeling bad about yourself - or that you are a failure or have let yourself or your family down? (P) 1-->several days   Trouble concentrating on things, such as reading the newspaper or watching television? (P) 2-->more than half the days   Moving or speaking so slowly that other people could have noticed? Or the opposite - being so fidgety or restless that you have been moving around a lot more than usual? (P) 1-->several days   Thoughts that you would be better off dead, or of hurting yourself in some way? (P) 0-->not at all   PHQ-9 Total Score (P) 15   If you checked off any problems, how difficult have these problems made it for you to do your work, take care of things at home, or get along with other people? (P) very difficult     PHQ-9 Total Score: (P) 15      Generalized Anxiety Disorder 7-Item (ALYX-7) Screening Tool  Feeling nervous, anxious or on edge: (P) Nearly every day  Not being able to stop or control worrying: (P) More than half the days  Worrying too much about different things: (P) More than half the days  Trouble Relaxing: (P) More than half the days  Being so restless that it is hard to sit still: (P) Several days  Feeling afraid as if something awful might happen: (P) More than half the days  Becoming easily annoyed or irritable: (P) Nearly every day  ALYX 7 Total Score: (P) 15  If you checked any problems, how difficult have these problems made it for you to do your work, take care of things at home, or get along with other people: (P) Very difficult      Previous Provider notes and available records reviewed by this APRN at today's encounter.         Lab Results:   No visits  with results within 1 Month(s) from this visit.   Latest known visit with results is:   Office Visit on 02/15/2024   Component Date Value Ref Range Status    Glucose 02/15/2024 95  65 - 99 mg/dL Final    BUN 02/15/2024 17  6 - 20 mg/dL Final    Creatinine 02/15/2024 0.84  0.57 - 1.00 mg/dL Final    EGFR Result 02/15/2024 89.7  >60.0 mL/min/1.73 Final    Comment: GFR Normal >60  Chronic Kidney Disease <60  Kidney Failure <15      BUN/Creatinine Ratio 02/15/2024 20.2  7.0 - 25.0 Final    Sodium 02/15/2024 138  136 - 145 mmol/L Final    Potassium 02/15/2024 4.2  3.5 - 5.2 mmol/L Final    Chloride 02/15/2024 101  98 - 107 mmol/L Final    Total CO2 02/15/2024 23.2  22.0 - 29.0 mmol/L Final    Calcium 02/15/2024 9.1  8.6 - 10.5 mg/dL Final    Total Protein 02/15/2024 6.3  6.0 - 8.5 g/dL Final    Albumin 02/15/2024 3.9  3.5 - 5.2 g/dL Final    Globulin 02/15/2024 2.4  gm/dL Final    A/G Ratio 02/15/2024 1.6  g/dL Final    Total Bilirubin 02/15/2024 <0.2  0.0 - 1.2 mg/dL Final    Alkaline Phosphatase 02/15/2024 53  39 - 117 U/L Final    AST (SGOT) 02/15/2024 27  1 - 32 U/L Final    ALT (SGPT) 02/15/2024 14  1 - 33 U/L Final    Hemoglobin A1C 02/15/2024 4.90  4.80 - 5.60 % Final    Comment: Hemoglobin A1C Ranges:  Increased Risk for Diabetes  5.7% to 6.4%  Diabetes                     >= 6.5%  Diabetic Goal                < 7.0%      TSH 02/15/2024 1.200  0.270 - 4.200 uIU/mL Final    Free T4 02/15/2024 1.03  0.93 - 1.70 ng/dL Final    Results may be falsely increased if patient taking Biotin.    Thyroid Peroxidase Antibody 02/15/2024 <9  0 - 34 IU/mL Final         Assessment & Plan   Problems Addressed this Visit    None  Visit Diagnoses       Generalized anxiety disorder  (Chronic)   -  Primary    Relevant Medications    buPROPion XL (Wellbutrin XL) 150 MG 24 hr tablet    sertraline (Zoloft) 100 MG tablet    traZODone (DESYREL) 100 MG tablet    Cariprazine HCl (Vraylar) 1.5 MG capsule capsule    Major depressive disorder,  recurrent episode, moderate  (Chronic)       Relevant Medications    buPROPion XL (Wellbutrin XL) 150 MG 24 hr tablet    sertraline (Zoloft) 100 MG tablet    traZODone (DESYREL) 100 MG tablet    Cariprazine HCl (Vraylar) 1.5 MG capsule capsule    Moderate alcohol use disorder  (Chronic)       Sleep difficulties  (Chronic)       Relevant Medications    traZODone (DESYREL) 100 MG tablet          Diagnoses         Codes Comments    Generalized anxiety disorder    -  Primary ICD-10-CM: F41.1  ICD-9-CM: 300.02     Major depressive disorder, recurrent episode, moderate     ICD-10-CM: F33.1  ICD-9-CM: 296.32     Moderate alcohol use disorder     ICD-10-CM: F10.20  ICD-9-CM: 303.90     Sleep difficulties     ICD-10-CM: G47.9  ICD-9-CM: 780.50             Visit Diagnoses:    ICD-10-CM ICD-9-CM   1. Generalized anxiety disorder  F41.1 300.02   2. Major depressive disorder, recurrent episode, moderate  F33.1 296.32   3. Moderate alcohol use disorder  F10.20 303.90   4. Sleep difficulties  G47.9 780.50         Rule out bipolar disorder, PTSD, and BPD    GOALS:  Short Term Goals: Patient will be compliant with medication, and patient will have no significant medication related side effects.  Patient will be engaged in psychotherapy as indicated. Patient will report subjective improvement of symptoms.  Long term goals: To stabilize mood and treat/improve subjective symptoms, the patient will stay out of the hospital, the patient will be at an optimal level of functioning, and the patient will take all medications as prescribed.  The patient verbalized understanding and agreement with goals that were mutually set.      TREATMENT PLAN: Continue supportive psychotherapy efforts and medications as indicated.  Medication and treatment options, both pharmacological and non-pharmacological treatment options, discussed during today's visit, including any off label use of medication. Patient acknowledged and verbally consented with  current treatment plan and was educated on the importance of compliance with treatment and follow-up appointments.      -Pt discontinued Campral on her own over the weekend due to side effects  -Pt discontinued Buspar on her own over the weekend   -Decrease Wellbutrin XL to 150 mg PO QAM x5 days, then discontinue. Pt reports this medication helps with continued smoking cessation, however, it does cause agitation. It could also be contributing to dry mouth.  -Discontinue Vistaril per pt request due to dry mouth  -Increase Trazodone to 100-200 mg PO QHS PRN for sleep  -Continue Zoloft 150 mg PO Daily for anxiety and depression  -Start Vraylar 1.5 mg PO Daily for mood stabilization  -Recommended pt restart therapy; pt intends on calling the [facility] to schedule with the therapist she had been seeing  -This APRN discussed with pt that she may need a higher level of care for more definitive diagnoses and addiction medicine. Pt was receptive to this. Pt states she needs to stay within the [facility]. Provided pt with a resource, Dr. Isha Vickers at  in [place]. The phone number is [phone]. Pt states she will call and set up an appointment with the psychiatrist. This APRN will have pt follow up in 4 weeks. Pt understands that if she is to get an appointment with the psychiatrist within 4 weeks to cancel the appointment with this APRN.   -Abstain from alcohol use      MEDICATION ISSUES:  Discussed treatment plan and medication options of prescribed medication as well as the risks, benefits, any black box warnings, and side effects including potential falls, possible impaired driving, and metabolic adversities among others, including any off label use of medication. Patient is agreeable to call the office with any worsening of symptoms or onset of side effects, or if any concerns or questions arise.  The contact information for the office is made available to the patient. Patient is agreeable to call [phone]  or go to the nearest ER should they begin having any SI/HI, or if any urgent concerns arise.       VERBAL INFORMED CONSENT FOR MEDICATION:  The patient was educated that their proposed/prescribed psychotropic medication(s) has potential risks, side effects, adverse effects, and black box warnings; and these have been discussed with the patient.  The patient has been informed that their treatment and medication dosage is to be individualized, and may even be above or below the recommended range/dosage due to patient individualization and response, but medication is prescribed using a shared decision making approach, and no medication or dosage will be prescribed without the patient's verbal consent.  The reason for the use of the medication including any off label use and alternative modes of treatment other than or in addition to medication has been considered and discussed, the probable consequences of not receiving the proposed treatment have been discussed, and any treatment side effects, black box warnings, and cautions associated with treatment have been discussed with the patient.  The patient is allowed ample time to openly discuss and ask questions regarding the proposed medication(s) and treatment plan and the patient verbalizes understanding the reasons for the use of the medication, its potential risks and benefits, other alternative treatment(s), and the probable consequences that may occur if the proposed medication is not given.  The patient has been given ample time to ask questions and study the information and find the information to be specific, accurate, and complete.  The patient gives verbal consent for the medication(s) proposed/prescribed, they verbalized understanding that they can refuse and withdraw consent at any time with the assistance of this APRN, and the patient has verbally confirmed that they are aware, and are willing, to take the prescribed medication and follow the treatment plan  with the known possible risks, side effect, black box warnings, and any potential medication interactions, and the patient reports they will be worse off without this medication and treatment plan.  The patient is advised to contact this APRN/this office if any questions or concerns arise at any time (at [phone]), or call 911/go to the closest emergency department if needed or outside of office hours.      SUICIDE RISK ASSESSMENT AND SAFETY PLAN: Unalterable demographics and a history of mental health intervention indicate this patient is in a high risk category compared to the general population. At present, the patient denies active SI/HI, intentions, or plans at this time and agrees to seek immediate care should such thoughts develop. The patient verbalizes understanding of how to access emergency care if needed and agrees to do so. Consideration of suicide risk and protective factors such as history, current presentation, individual strengths and weaknesses, psychosocial and environmental stressors and variables, psychiatric illness and symptoms, medical conditions and pain, took place in this interview. Based on those considerations, the patient is determined: within individual baseline and presenting no imminent risk for suicide or homicide. Other recommendations: The patient does not meet the criteria for inpatient admission and is not a safety risk to self or others at today's visit. Inpatient treatment offers no significant advantages over outpatient treatment for this patient at today's visit.  The patient was given ample time for questions and fully participated in treatment planning.  The patient was encouraged to call the clinic with any questions or concerns.  The patient was informed of access to emergency care. If patient were to develop any significant symptomatology, suicidal ideation, homicidal ideation, any concerns, or feel unsafe at any time they are to call the clinic and if unable to get  immediate assistance should immediately call 911 or go to the nearest emergency room.  Patient contracted verbally for the following: If you are experiencing an emotional crisis or have thoughts of harming yourself or others, please go to your nearest local emergency room or call 911. Will continue to re-assess medication response and side effects frequently to establish efficacy and ensure safety. Risks, any black box warnings, side effects, off label usage, and benefits of medication and treatment discussed with patient, along with potential adverse side effects of current and/or newly prescribed medication, alternative treatment options, and OTC medications.  Patient verbalized understanding of potential risks, any off label use of medication, any black box warnings, and any side effects in their own words. The patient verbalized understanding and agreed to comply with the safety plan discussed in their own words.  Patient given the number to the office. Number also discussed of the 24- hour suicide hotline.       MEDS ORDERED DURING VISIT:  New Medications Ordered This Visit   Medications    buPROPion XL (Wellbutrin XL) 150 MG 24 hr tablet     Sig: Take 1 tablet by mouth Every Morning for 5 days , then discontinue     Dispense:  5 tablet     Refill:  0    sertraline (Zoloft) 100 MG tablet     Sig: Take 1.5 tablets by mouth Daily.     Dispense:  45 tablet     Refill:  1    traZODone (DESYREL) 100 MG tablet     Sig: Take 1-2 tablets by mouth At Night As Needed for sleep     Dispense:  60 tablet     Refill:  1    Cariprazine HCl (Vraylar) 1.5 MG capsule capsule     Sig: Take 1 capsule by mouth Daily.     Dispense:  30 capsule     Refill:  1       Return in about 4 weeks (around 7/10/2024), or if symptoms worsen or fail to improve, for Recheck.     Treatment plan completed: 11/14/23    Progress toward goal: Not at goal    Functional Status: Moderate impairment     Prognosis: Fair with Ongoing Treatment         This  document has been electronically signed by CHRIS Gama  June 12, 2024 15:30 EDT    Some of the data in this electronic note has been brought forward from a previous encounter, any necessary changes have been made, it has been reviewed by this APRN, and it is accurate.    Please note that portions of this note were completed with a voice recognition program. Efforts were made to edit dictation, but occasionally words are mistranscribed.

## 2024-06-12 NOTE — TELEPHONE ENCOUNTER
I spoke to the [facility] and patient is scheduled for medication management with Dr. Vickers on 08/11/2024.  That office has also put her on a wait list and will call her if anything else becomes available.  The  at the [facility] office explained to me that she only relayed a message of that office doesn't do any type of psychological test and if she did need that then she would have to be referred out for that.  I called patient back and explained to her that medication management is what she was being referred to.  Patient stated she was just confused but she will keep the appointment with Dr. Vickers.

## 2024-06-12 NOTE — TELEPHONE ENCOUNTER
Patient called the office stated she called [facility] psychiatrist in [facility] and they told her they do not dx patients. Patient stated the office told her they  only do med management. Patient also stated that office told her if she is needing a dx she would need to see a Psychology. Patient is asking if there someone else you recommend someone else for her to see?

## 2024-06-14 ENCOUNTER — OFFICE VISIT (OUTPATIENT)
Dept: INTERNAL MEDICINE | Age: 44
End: 2024-06-14
Payer: COMMERCIAL

## 2024-06-14 VITALS
SYSTOLIC BLOOD PRESSURE: 140 MMHG | RESPIRATION RATE: 18 BRPM | WEIGHT: 274 LBS | HEART RATE: 85 BPM | HEIGHT: 64 IN | TEMPERATURE: 97.6 F | DIASTOLIC BLOOD PRESSURE: 80 MMHG | BODY MASS INDEX: 46.78 KG/M2 | OXYGEN SATURATION: 96 %

## 2024-06-14 DIAGNOSIS — I10 PRIMARY HYPERTENSION: Primary | ICD-10-CM

## 2024-06-14 DIAGNOSIS — F41.9 ANXIETY: ICD-10-CM

## 2024-06-14 PROCEDURE — 99214 OFFICE O/P EST MOD 30 MIN: CPT

## 2024-06-14 RX ORDER — CLONIDINE HYDROCHLORIDE 0.1 MG/1
0.1 TABLET ORAL 2 TIMES DAILY
Qty: 30 TABLET | Refills: 0 | Status: SHIPPED | OUTPATIENT
Start: 2024-06-14

## 2024-06-14 NOTE — PROGRESS NOTES
"    I N T E R N A L  M E D I C I N E  Jane Buenrostro, APRN    ENCOUNTER DATE:  06/14/2024    Brittany Sheppard / 43 y.o. / female      CHIEF COMPLAINT / REASON FOR OFFICE VISIT     Med Refill and patient wanted to harm her  (So she needed some time away from her spouse so she wouldn't harm him.)      ASSESSMENT & PLAN     Diagnoses and all orders for this visit:    1. Primary hypertension (Primary)  -     cloNIDine (Catapres) 0.1 MG tablet; Take 1 tablet by mouth 2 (Two) Times a Day.  Dispense: 30 tablet; Refill: 0    2. Anxiety  -     cloNIDine (Catapres) 0.1 MG tablet; Take 1 tablet by mouth 2 (Two) Times a Day.  Dispense: 30 tablet; Refill: 0         SUMMARY/DISCUSSION  She recently took clonidine during inpatient admission for alcohol detox with benefit for anxiety and sleep symptoms.  Reports good control of her BP while off of prescribed metoprolol while taking clonidine.  At this time, she will HOLD metoprolol and we will start low dose clonidine BID.  Encouraged to continue to avoid alcohol/ smoking.  She has close follow up scheduled with psychiatry, and encouraged to follow up as scheduled.  She declines additional assistance to help with alcohol cessation at this time.  No SI/ HI.  She will bring in blood pressure/ HR log for follow up appointment next week.        Next Appointment with me: 6/20/2024    Return for Next scheduled follow up.      VITAL SIGNS     Visit Vitals  /80   Pulse 85   Temp 97.6 °F (36.4 °C)   Resp 18   Ht 162.6 cm (64.02\")   Wt 124 kg (274 lb)   SpO2 96%   BMI 47.01 kg/m²             Wt Readings from Last 3 Encounters:   06/14/24 124 kg (274 lb)   04/18/24 123 kg (271 lb)   03/14/24 126 kg (278 lb)     Body mass index is 47.01 kg/m².        MEDICATIONS AT THE TIME OF OFFICE VISIT     Current Outpatient Medications on File Prior to Visit   Medication Sig Dispense Refill    albuterol sulfate  (90 Base) MCG/ACT inhaler Inhale 2 puffs Every 4 (Four) Hours As Needed for " Wheezing or Shortness of Air. 18 g 0    buPROPion XL (Wellbutrin XL) 150 MG 24 hr tablet Take 1 tablet by mouth Every Morning for 5 days , then discontinue 5 tablet 0    Cariprazine HCl (Vraylar) 1.5 MG capsule capsule Take 1 capsule by mouth Daily. 30 capsule 1    sertraline (Zoloft) 100 MG tablet Take 1.5 tablets by mouth Daily. 45 tablet 1    traZODone (DESYREL) 100 MG tablet Take 1-2 tablets by mouth At Night As Needed for sleep 60 tablet 1    valACYclovir (Valtrex) 500 MG tablet Take 1 tablet by mouth 2 (Two) Times a Day. 90 tablet 2     No current facility-administered medications on file prior to visit.        HISTORY OF PRESENT ILLNESS     She was admitted at a mental health facility in [place], [place] for a total of 11 days in May 2024.  Records unavailable at today's visit, but being requested.  She and her  engaged in a verbal altercation and she self admitted herself out of concerns that she wanted to hurt her .  Prior to this event, she had been drinking 1 pint a day of Linda cognac.  While admitted, she was prescribed Campral to help with alcohol cravings but she stopped taking this due to side effects.  Denies any alcohol cravings.  Continues to avoid alcohol and cigarettes.  No SI/ HI.      Recently followed up with psychiatry, CHRIS Gama on June 12, 2024 for management of anxiety/ depression/ moderate alcohol use disorder.  She is currently being tapered off of bupropion XL, taking 150 mg for five more days.  Remains on sertraline 150 mg daily.  Started on Vraylar 1.5 mg daily.  Taking trazodone 100 mg nightly, but reports this is not helping with sleep.   She took clonidine while admitted inpatient and reports this helped with sleep, anxiety.  Also helped with control of blood pressure, and she was able to HOLD her prescribed metroprolol during her admission.  Today's BP is elevated, 140/80.  She has plans to resume counseling in near future.  Plans to meet with   [facility], Dr. Vickers in August 2024.      Patient Care Team:  Jane Buenrostro APRN as PCP - General (Family Medicine)  Lilia Joshua PA-C as Physician Assistant (Colon and Rectal Surgery)    REVIEW OF SYSTEMS     Review of Systems   Constitutional:  Negative for chills, fever and unexpected weight change.   Respiratory:  Negative for cough, chest tightness and shortness of breath.    Cardiovascular:  Negative for chest pain, palpitations and leg swelling.   Neurological:  Negative for dizziness, weakness, light-headedness and headaches.   Psychiatric/Behavioral:  Positive for dysphoric mood and sleep disturbance. Negative for self-injury and suicidal ideas. The patient is nervous/anxious.           PHYSICAL EXAMINATION     Physical Exam  Vitals reviewed.   Constitutional:       General: She is not in acute distress.     Appearance: Normal appearance. She is not ill-appearing, toxic-appearing or diaphoretic.   HENT:      Head: Normocephalic and atraumatic.   Cardiovascular:      Rate and Rhythm: Normal rate and regular rhythm.      Heart sounds: Normal heart sounds.   Pulmonary:      Effort: Pulmonary effort is normal.      Breath sounds: Normal breath sounds.   Neurological:      Mental Status: She is alert and oriented to person, place, and time. Mental status is at baseline.   Psychiatric:         Mood and Affect: Mood normal.         Behavior: Behavior normal.         Thought Content: Thought content normal.         Judgment: Judgment normal.           REVIEWED DATA     Labs:           Imaging:            Medical Tests:           Summary of old records / correspondence / consultant report:           Request outside records:

## 2024-06-20 ENCOUNTER — TELEPHONE (OUTPATIENT)
Dept: PSYCHIATRY | Facility: CLINIC | Age: 44
End: 2024-06-20
Payer: COMMERCIAL

## 2024-06-20 ENCOUNTER — OFFICE VISIT (OUTPATIENT)
Dept: INTERNAL MEDICINE | Age: 44
End: 2024-06-20
Payer: COMMERCIAL

## 2024-06-20 ENCOUNTER — HOSPITAL ENCOUNTER (OUTPATIENT)
Facility: HOSPITAL | Age: 44
Discharge: HOME | End: 2024-06-20
Payer: COMMERCIAL

## 2024-06-20 VITALS
DIASTOLIC BLOOD PRESSURE: 74 MMHG | RESPIRATION RATE: 18 BRPM | SYSTOLIC BLOOD PRESSURE: 120 MMHG | TEMPERATURE: 97.5 F | WEIGHT: 279 LBS | OXYGEN SATURATION: 99 % | HEART RATE: 100 BPM | BODY MASS INDEX: 47.63 KG/M2 | HEIGHT: 64 IN

## 2024-06-20 DIAGNOSIS — M25.531 RIGHT WRIST PAIN: ICD-10-CM

## 2024-06-20 DIAGNOSIS — G47.9 SLEEP DISTURBANCE: Primary | ICD-10-CM

## 2024-06-20 DIAGNOSIS — M06.9 RHEUMATOID ARTHRITIS, INVOLVING UNSPECIFIED SITE, UNSPECIFIED WHETHER RHEUMATOID FACTOR PRESENT: ICD-10-CM

## 2024-06-20 DIAGNOSIS — Z59.819 HOUSING SITUATION UNSTABLE: ICD-10-CM

## 2024-06-20 PROCEDURE — 73110 X-RAY EXAM OF WRIST: CPT

## 2024-06-20 PROCEDURE — 99214 OFFICE O/P EST MOD 30 MIN: CPT

## 2024-06-20 SDOH — ECONOMIC STABILITY - HOUSING INSECURITY: HOUSING INSTABILITY UNSPECIFIED: Z59.819

## 2024-06-20 NOTE — TELEPHONE ENCOUNTER
Spoke with patient, made aware of providers orders. Patient will discontinue the medication at this time.

## 2024-06-20 NOTE — PROGRESS NOTES
"    I N T E R N A L  M E D I C I N E  Jane Buenrostro, APRN    ENCOUNTER DATE:  06/20/2024    Brittany Sheppard / 43 y.o. / female      CHIEF COMPLAINT / REASON FOR OFFICE VISIT     Hypertension      ASSESSMENT & PLAN     Diagnoses and all orders for this visit:    1. Sleep disturbance (Primary)    2. Right wrist pain  -     XR Wrist 3+ View Right; Future    3. Rheumatoid arthritis, involving unspecified site, unspecified whether rheumatoid factor present  -     Ambulatory Referral to Rheumatology    4. Housing situation unstable  -     Ambulatory Referral to Social Care Services (Amb Case Mgmt)         SUMMARY/DISCUSSION  Recommend she continue clonidine 0.1 mg in the am only and then re try trazodone 100 mg in the evenings to help with sleep.  She will continue to monitor daily BP readings and send me an update via [facility] in 1-2 weeks.  Ensure close follow up with psychiatry office.    Will evaluate her acute wrist discomfort further with XR.  New rheumatology referral placed per her request.   referral placed to assist patient with her goal of finding new stable housing, and moving out of her current shared living situation with partner.        Next Appointment with me: Visit date not found    Return in about 2 months (around 8/20/2024) for Chronic care.      VITAL SIGNS     Visit Vitals  /74   Pulse 100   Temp 97.5 °F (36.4 °C)   Resp 18   Ht 162.6 cm (64.02\")   Wt 127 kg (279 lb)   SpO2 99%   BMI 47.87 kg/m²             Wt Readings from Last 3 Encounters:   06/20/24 127 kg (279 lb)   06/14/24 124 kg (274 lb)   04/18/24 123 kg (271 lb)     Body mass index is 47.87 kg/m².        MEDICATIONS AT THE TIME OF OFFICE VISIT     Current Outpatient Medications on File Prior to Visit   Medication Sig Dispense Refill    albuterol sulfate  (90 Base) MCG/ACT inhaler Inhale 2 puffs Every 4 (Four) Hours As Needed for Wheezing or Shortness of Air. 18 g 0    Cariprazine HCl (Vraylar) 1.5 MG capsule capsule " Take 1 capsule by mouth Daily. 30 capsule 1    cloNIDine (Catapres) 0.1 MG tablet Take 1 tablet by mouth 2 (Two) Times a Day. 30 tablet 0    sertraline (Zoloft) 100 MG tablet Take 1.5 tablets by mouth Daily. 45 tablet 1    traZODone (DESYREL) 100 MG tablet Take 1-2 tablets by mouth At Night As Needed for sleep 60 tablet 1    valACYclovir (Valtrex) 500 MG tablet Take 1 tablet by mouth 2 (Two) Times a Day. 90 tablet 2     No current facility-administered medications on file prior to visit.        HISTORY OF PRESENT ILLNESS     Here as 1 week follow up after starting clonidine 0.1 mg BID for anxiety, HTN.  BP is well controlled but has noticed some occasionally low BP readings in the morning, averaging 100-90/70. Notes clonidine is helpful for her anxiety but continues with difficulty initiating sleeping at night.  Formerly took trazodone 100 mg nightly and she is interested in trying this again to assess for sleep benefit.  Does have KENDRA, wearing CPAP nightly.       Followed by psychiatry, CHRIS Gama for anxiety/ depression/ moderate alcohol use disorder.  Now off of bupropion XL.  Remains on sertraline 150 mg daily.  Started on Vraylar 1.5 mg daily but this was discontinued today due to tremors.  Plans to meet with new psychiatrist, Dr. Vickers in August 2024.  Continues to avoid alcohol and cigarettes.  No SI/ HI.      Followed by Nitza Merino, rheumatologist, for RA.  Prescribed Plaquenil but did not start.   Her multiple joint arthralgias are particularly bothersome as of late and she would like to meet with knew rheumatologist.      Reports 1 day swelling and discomfort at right radial wrist.  No history of trauma or injury.  No erythema, warmth.  Wearing a brace with some benefit.        Patient Care Team:  Jane Buenrostro APRN as PCP - General (Family Medicine)  Lilia Joshua PA-C as Physician Assistant (Colon and Rectal Surgery)    REVIEW OF SYSTEMS     Review of Systems   Constitutional:  Negative  for chills, fever and unexpected weight change.   Respiratory:  Negative for cough, chest tightness and shortness of breath.    Cardiovascular:  Negative for chest pain, palpitations and leg swelling.   Musculoskeletal:  Positive for arthralgias (Right wrist).   Neurological:  Negative for dizziness, weakness, light-headedness and headaches.   Psychiatric/Behavioral:  Positive for dysphoric mood and sleep disturbance. Negative for self-injury and suicidal ideas. The patient is nervous/anxious.           PHYSICAL EXAMINATION     Physical Exam  Vitals reviewed.   Constitutional:       General: She is not in acute distress.     Appearance: Normal appearance. She is not ill-appearing, toxic-appearing or diaphoretic.   HENT:      Head: Normocephalic and atraumatic.   Cardiovascular:      Rate and Rhythm: Normal rate and regular rhythm.      Heart sounds: Normal heart sounds.   Pulmonary:      Effort: Pulmonary effort is normal.      Breath sounds: Normal breath sounds.   Musculoskeletal:      Right wrist: Tenderness (Mild, right radial wrist) present. No swelling. Decreased range of motion. Normal pulse.   Neurological:      Mental Status: She is alert and oriented to person, place, and time. Mental status is at baseline.   Psychiatric:         Mood and Affect: Mood normal.         Behavior: Behavior normal.         Thought Content: Thought content normal.         Judgment: Judgment normal.           REVIEWED DATA     Labs:           Imaging:            Medical Tests:           Summary of old records / correspondence / consultant report:           Request outside records:

## 2024-06-20 NOTE — TELEPHONE ENCOUNTER
Patient called office stating she has had increase of tremors lately more than normal. Patient stated she feels like it is a side effect of Vraylar 1.5 mg. Patient wants to know if provider would for patient to continue this medication.

## 2024-06-20 NOTE — TELEPHONE ENCOUNTER
Since the medication is causing tremors, I would advise the patient to discontinue the medication at this time. Please have pt give it time to see if she returns to baseline. Vraylar has a long half-life and it takes time for the medication to be completely eliminated from the body. Thank you.

## 2024-06-21 ENCOUNTER — REFERRAL TRIAGE (OUTPATIENT)
Age: 44
End: 2024-06-21
Payer: COMMERCIAL

## 2024-06-24 ENCOUNTER — PATIENT OUTREACH (OUTPATIENT)
Age: 44
End: 2024-06-24
Payer: COMMERCIAL

## 2024-06-24 DIAGNOSIS — I10 PRIMARY HYPERTENSION: ICD-10-CM

## 2024-06-24 DIAGNOSIS — F41.9 ANXIETY: ICD-10-CM

## 2024-06-24 NOTE — OUTREACH NOTE
Social Work Assessment  Questions/Answers      Flowsheet Row Most Recent Value   Referral Source physician   Reason for Consult community resources, financial concerns, housing concerns/homeless   Preferred Language English   People in Home spouse   Current Living Arrangements home   Potentially Unsafe Housing Conditions none   Primary Care Provided by self   Family Caregiver if Needed none   Quality of Family Relationships unable to assess   Source of Income salary/wages   Application for Public Assistance obtained public assistance pending number        SDOH updated and reviewed with the patient during this program:      --     Depression: At risk (6/12/2024)    PHQ-2     PHQ-2 Score: 15      --     Disabilities: Not At Risk (10/16/2023)    Disabilities     Concentrating, Remembering, or Making Decisions Difficulty: no     Doing Errands Independently Difficulty: no      --     Employment: Not At Risk (10/16/2023)    Employment     Do you want help finding or keeping work or a job?: I do not need or want help      Financial Resource Strain: Low Risk  (10/16/2023)    Overall Financial Resource Strain (CARDIA)     Difficulty of Paying Living Expenses: Not hard at all      --     Food Insecurity: No Food Insecurity (10/16/2023)    Hunger Vital Sign     Worried About Running Out of Food in the Last Year: Never true     Ran Out of Food in the Last Year: Never true      --     Health Literacy: Unknown (6/24/2024)    Education     Preferred Language: English      --     Housing Stability: Not At Risk (6/24/2024)    Housing Stability     Current Living Arrangements: home     Potentially Unsafe Housing Conditions: none      --      Abuse Screen        --      Family and Community Support        --     Tobacco Use: Medium Risk (6/20/2024)    Patient History     Smoking Tobacco Use: Former     Smokeless Tobacco Use: Never     Passive Exposure: Never      --     Transportation Needs: No Transportation Needs (10/16/2023)     PRAPARE - Transportation     Lack of Transportation (Medical): No     Lack of Transportation (Non-Medical): No      --     Utilities: Not At Risk (10/16/2023)    [facility] Utilities     Threatened with loss of utilities: No     Patient Outreach    SW spoke with pt re housing concerns. SW and pt discussed SW e-mailing her a list of apartments with different price ranges in her zip code, as well as the best websites to search for apartments. Pt was agreeable to this, and SW included income-based housing links additionally. SW also send information on the Employee Support Fund and the application for that. Pt expressed thanks for the assistance. SW will F/u next week to assess for any further needs.     Shirin KNUTSON -   Ambulatory Case Management    6/24/2024, 12:56 EDT

## 2024-06-25 DIAGNOSIS — F41.9 ANXIETY: ICD-10-CM

## 2024-06-25 DIAGNOSIS — I10 PRIMARY HYPERTENSION: ICD-10-CM

## 2024-06-25 RX ORDER — CLONIDINE HYDROCHLORIDE 0.1 MG/1
0.1 TABLET ORAL 2 TIMES DAILY
Qty: 90 TABLET | Refills: 0 | Status: SHIPPED | OUTPATIENT
Start: 2024-06-25 | End: 2024-06-25 | Stop reason: SDUPTHER

## 2024-06-26 ENCOUNTER — TELEPHONE (OUTPATIENT)
Dept: FAMILY MEDICINE CLINIC | Facility: CLINIC | Age: 44
End: 2024-06-26
Payer: COMMERCIAL

## 2024-06-26 ENCOUNTER — TELEPHONE (OUTPATIENT)
Dept: INTERNAL MEDICINE | Age: 44
End: 2024-06-26
Payer: COMMERCIAL

## 2024-06-26 RX ORDER — CLONIDINE HYDROCHLORIDE 0.1 MG/1
0.1 TABLET ORAL 2 TIMES DAILY
Qty: 180 TABLET | Refills: 0 | Status: SHIPPED | OUTPATIENT
Start: 2024-06-26

## 2024-06-26 NOTE — TELEPHONE ENCOUNTER
Caller: Brittany Sheppard    Relationship: Self    Best call back number: [phone]     What is the best time to reach you: ANYTIME    Who are you requesting to speak with (clinical staff, provider,  specific staff member): CLINICAL    What was the call regarding: PATIENT CALLING STATING THAT SHE HAS HAD A ROUGH WEEKEND AND UNDER A LOT OF STRESS SHE STATED THAT SHE HAS HAD PIANNIC  ATTACK AFTER PANNIC ATTACK ALONG WITH A HEADACHE SHE WOULD LIKE TO KNOW WHAT SHE SHOULD DO    Is it okay if the provider responds through MyChart: YES

## 2024-06-26 NOTE — TELEPHONE ENCOUNTER
PATIENT CALLED AND ASKED TO VIEW CHART REGARDING A DOCUMENT THAT WAS SCANNED IN ON 06/18/2024 UNDER OFFICE NOTES [facility]. PATIENT WANTED TO KNOW WHO THE PHYSICIAN WAS ON THE DOCUMENTS.

## 2024-06-26 NOTE — TELEPHONE ENCOUNTER
Please call patient.    If she has acutely worsening anxiety/ headache or for any vision changes, numbness, tingling, weakness, recommend being seen in the ER.  If she would like to consider medication options to help with panic, will need an appointment in our office.

## 2024-06-27 ENCOUNTER — TELEPHONE (OUTPATIENT)
Dept: INTERNAL MEDICINE | Age: 44
End: 2024-06-27

## 2024-06-27 NOTE — TELEPHONE ENCOUNTER
Caller: Brittany Sheppard    Relationship to patient: Self    Best call back number: [phone]     Chief complaint: DISCUSS MEDICATION    Type of visit: OFFICE VISIT    Requested date: TOMORROW 6-28-24 SHE IS OFF ON FMLA AND CAN DO IT THAT DAY TO.      If rescheduling, when is the original appointment: 6-27-24     Additional notes:PLEASE CALL PATIENT TO DISCUSS NESTOR

## 2024-06-28 ENCOUNTER — OFFICE VISIT (OUTPATIENT)
Dept: INTERNAL MEDICINE | Age: 44
End: 2024-06-28
Payer: COMMERCIAL

## 2024-06-28 VITALS
WEIGHT: 274.4 LBS | DIASTOLIC BLOOD PRESSURE: 86 MMHG | HEIGHT: 64 IN | BODY MASS INDEX: 46.85 KG/M2 | SYSTOLIC BLOOD PRESSURE: 124 MMHG | OXYGEN SATURATION: 98 % | HEART RATE: 88 BPM | TEMPERATURE: 98.6 F

## 2024-06-28 DIAGNOSIS — F31.32 BIPOLAR 1 DISORDER, DEPRESSED, MODERATE: Primary | Chronic | ICD-10-CM

## 2024-06-28 DIAGNOSIS — F10.21 ALCOHOL USE DISORDER, MODERATE, IN EARLY REMISSION: Chronic | ICD-10-CM

## 2024-06-28 PROCEDURE — 99214 OFFICE O/P EST MOD 30 MIN: CPT | Performed by: PHYSICIAN ASSISTANT

## 2024-06-28 NOTE — ASSESSMENT & PLAN NOTE
Avoid people, places, or things that allow her to be in proximity to alcoholic beverages.     Reestablish contact with Sponsor, and keep scheduled meetings.     Join outpatient recovery counseling or meetings.

## 2024-06-28 NOTE — ASSESSMENT & PLAN NOTE
Continue current medication regimen as planned.     Genesight Screen to be ordered to help plan adjustments in medical care.
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
## 2024-07-01 ENCOUNTER — PATIENT OUTREACH (OUTPATIENT)
Age: 44
End: 2024-07-01
Payer: COMMERCIAL

## 2024-07-01 NOTE — OUTREACH NOTE
Patient Outreach    SW spoke with pt, to make sure she has received the information sent ot her. Pt confirmed she did on the 24th. Pt denies further needs at this time. SW to D/c due to meeting initial outreach goal.     Shirin KNUTSON -   Ambulatory Case Management    7/1/2024, 12:43 EDT

## 2024-07-02 ENCOUNTER — TELEPHONE (OUTPATIENT)
Dept: INTERNAL MEDICINE | Age: 44
End: 2024-07-02
Payer: COMMERCIAL

## 2024-07-02 NOTE — TELEPHONE ENCOUNTER
Personally called patient to complete [place] paperwork, per her request.  She provided consent to complete over the phone, including consent to include medical details on paperwork.

## 2024-07-25 ENCOUNTER — TELEMEDICINE (OUTPATIENT)
Dept: PSYCHIATRY | Facility: CLINIC | Age: 44
End: 2024-07-25
Payer: COMMERCIAL

## 2024-07-25 DIAGNOSIS — G47.9 SLEEP DIFFICULTIES: Chronic | ICD-10-CM

## 2024-07-25 DIAGNOSIS — F41.1 GENERALIZED ANXIETY DISORDER: Primary | Chronic | ICD-10-CM

## 2024-07-25 DIAGNOSIS — F33.1 MAJOR DEPRESSIVE DISORDER, RECURRENT EPISODE, MODERATE: Chronic | ICD-10-CM

## 2024-07-25 DIAGNOSIS — F10.20 MODERATE ALCOHOL USE DISORDER: Chronic | ICD-10-CM

## 2024-07-25 RX ORDER — HYDROXYZINE PAMOATE 50 MG/1
50-100 CAPSULE ORAL 3 TIMES DAILY PRN
Qty: 90 CAPSULE | Refills: 1 | Status: SHIPPED | OUTPATIENT
Start: 2024-07-25

## 2024-07-25 RX ORDER — SERTRALINE HYDROCHLORIDE 100 MG/1
200 TABLET, FILM COATED ORAL DAILY
Qty: 60 TABLET | Refills: 1 | Status: SHIPPED | OUTPATIENT
Start: 2024-07-25

## 2024-07-25 NOTE — PROGRESS NOTES
"This provider is completing this appointment through [facility] (formerly [facility]) (through [facility]), [address], [facility], [address] using a secure "[employer]hart Video Visit through [employer]. Patient is being seen remotely via telehealth at their residence in [place], and stated they are in a secure environment for this session. The patient's condition being diagnosed/treated is appropriate for telemedicine. The provider identified herself as well as her credentials.  The patient, and/or patients guardian, consent to be seen remotely, and when consent is given they understand that the consent allows for patient identifiable information to be sent to a third party as needed.   They may refuse to be seen remotely at any time. The electronic data is encrypted and password protected, and the patient and/or guardian has been advised of the potential risks to privacy not withstanding such measures.    You have chosen to receive care through a telehealth visit.  Do you consent to use a video/audio connection for your medical care today? Yes    Patient identifiers utilized: Name and date of birth.        Subjective   Brittany Sheppard is a 44 y.o. female who presents today for follow up     Chief Complaint:  Depression, anxiety, ETOH abuse, sleep disturbance    Accompanied by: Pt was alone for duration of appointment    History of Present Illness:   *Pt was last seen by this APRN on 6/12/24. Pt called on 6/20/24 stating Vraylar was causing an increase in tremors, therefore, pt was advised to discontinue the medication at that time.*    Pt states she has been \"okay\" since her last appointment. Pt moved out of the home she shares with her  for a few weeks. Pt states she overheard him talking to a friend about her. Per pt, he was calling her derogatory names and talking about how he wanted to sleep with a coworker. Pt states she became very angry - she yelled, threw things, broke items. After a " few weeks, pt moved back home stating she could not continue living with her mother or friend. Pt and her  only talk as much as they have to now. Pt states they are short-staffed at work and she finds it stressful. 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 Pt admits that she has been drinking a pint of ETOH approximately twice a week. Pt has not been talking to her sponsor and occasionally goes to an AA meeting. This APRN recommended pt abstain from ETOH use and restart therapy. Pt declines IOP. Pt has been spending time with her cousin. Pt reports she has poor energy and motivation. Pt feels she has to pull herself together each morning to go to work. Pt states that anything over 100 mg of Trazodone causes nocturnal enuresis. Pt is taking 10 mg of melatonin ER with the 100 mg of Trazodone and still only sleeping 4 hours. She is now only taking the Trazodone PRN. Pt feels her anxiety is her biggest struggle at this time. Pt would like this APRN to restart the Vistaril  mg PRN. Discussed other possible medication options with pt at this time, such as increasing the Zoloft, changing to a different antidepressant, or adding a mood stabilizer. Pt opted to increase the Zoloft. Pt has never exhibited manic and/or hypomanic at any of her appointments. During pt's initial evaluation in 11/2023, pt stated she had previously been diagnosed with bipolar disorder. Per pt, when she is angry, she typically breaks things. At one point, she has broken every television in the house. Pt will throw items, slam doors, yell, and scream. Pt admitted to putting her hands on her . Pt denied knowing where the anger comes from. Pt reported emotional instability where her mood changes frequently during the course of a day. Pt admitted to an excessive amount of debt and impulsively spends money even after filing for bankruptcy. However, she denied all other symptoms of bipolar disorder. Pt currently has a  "MDD diagnosis from this APRN, however, bipolar disorder and BPD are rule out diagnoses. This APRN feels pt needs a higher level of care for more definitive diagnoses/addiction medicine as pt recently discontinued the Campral prescribed while inpatient at [facility] and has started drinking ETOH again. Pt has an appointment with psychiatrist, Dr. Isha Vickers, on 8/13/24. The patient denies any new medical problems since last appointment with this facility. The patient rates their depression on average in the past week at a 7/10 on a 0-10 scale, with 10 being the worst. The patient rates their irritability on average in the past week at a 7/10 on a 0-10 scale, with 10 being the worst.The patient rates their anxiety on average the past week at a 10/10 on a 0-10 scale, with 10 being the worst. The patient would like to increase their medications at this visit. The patient denies any suicidal or homicidal ideations, plans, or intent at today's encounter and is convincing. The patient denies any auditory hallucinations or visual hallucinations. The patient does not endorse any significant symptoms consistent with rosamaria or psychosis at today's encounter.     *If the patient has any concerns or needs assistance, they may call the [facility] at [phone]*        Prior Psychiatric Medications:  Wellbutrin XL - helped her quit smoking, initially thought it was making her angry  Abilify - insomnia  Atarax - helps with sleep onset  Elavil - diaphoresis, RLS  Seroquel - diaphoresis, gaining weight, prescribed for sleep  Effexor - diaphoresis  Cymbalta - diaphoresis  Celexa - some benefit; was on it for six months and stopped taking it because it made her \"numb\"  Ambien - \"crazy stuff\" at night time  Trazodone  Campral - GI side effects, dry mouth  Buspar - did not notice a difference on 10 mg PO BID  Vraylar - discontinued after a week due to possible tremors      The following " portions of the patient's history were reviewed and updated as appropriate: allergies, current medications, past family history, past medical history, past social history, past surgical history and problem list.          Past Medical History:  Past Medical History:   Diagnosis Date    Acute recurrent frontal sinusitis 2022    Anemia     Anxiety     Arthritis     Asthma     Back pain     Bipolar 1 disorder     COPD (chronic obstructive pulmonary disease)     Depression     Dyspnea     Elevated liver enzymes     GERD (gastroesophageal reflux disease)     H. pylori infection     treated 2016    Hepatitis C 2015    harvoni completed; attributed to tatoo    Hirsutism     History of migraine headaches     with scotomata    HPV in female 2021    HPV in female 2014    HPV in female 10/2015    Hypertension     IBS (irritable bowel syndrome)     Insomnia     LGSIL on Pap smear of cervix 2016    cx biopsy confoirmed LGSIL    Obesity     PCOS (polycystic ovarian syndrome)     PCR positive for herpes simplex virus type 1 (HSV-1) DNA 2018    Preop cardiovascular exam 10/10/2014    Sleep apnea     Steatosis of liver     liver biopsy; moderate; stage 3, grade 0    Vitamin D deficiency        Social History:  Social History     Socioeconomic History    Marital status:    Tobacco Use    Smoking status: Former     Current packs/day: 0.00     Types: Cigarettes     Quit date:      Years since quittin.5     Passive exposure: Never    Smokeless tobacco: Never   Vaping Use    Vaping status: Never Used   Substance and Sexual Activity    Alcohol use: Yes     Comment: occassionally    Drug use: Not Currently     Types: Marijuana    Sexual activity: Yes     Partners: Male, Female       Family History:  Family History   Problem Relation Age of Onset    Hypertension Mother     Obesity Mother     Diabetes Mother     Sleep apnea Mother     Bipolar disorder Father     Diabetes Father     Hypertension Father      Migraines Father     Alcohol abuse Father     Stroke Maternal Grandmother     Breast cancer Maternal Grandmother 72    Diabetes Maternal Grandmother     Hypertension Maternal Grandmother     Stroke Maternal Grandfather     Cancer Maternal Grandfather     Stroke Paternal Grandmother     Breast cancer Paternal Grandmother     Hypertension Paternal Grandmother     Pancreatic cancer Paternal Grandmother     Schizophrenia Nephew     Bipolar disorder Nephew        Past Surgical History:  Past Surgical History:   Procedure Laterality Date    APPENDECTOMY  07/19/2010    [facility]    BREAST SURGERY      I & D of abcess X3    D & C HYSTEROSCOPY ENDOMETRIAL ABLATION  12/16/2010    menorrhagia; pathology benign    LAPAROSCOPIC GASTRIC BANDING  03/27/2017    Dr. Prieto [facility]    LIVER BIOPSY      PILONIDAL CYST / SINUS EXCISION  09/2008    POLYPECTOMY      TOOTH EXTRACTION  2016    WISDOM TOOTH EXTRACTION  2012       Problem List:  Patient Active Problem List   Diagnosis    Abscess of axilla, left    Lumbago    Acute hepatitis C without hepatic coma    Anxiety and depression    Asthma    Atopic contact dermatitis    Benign hypertension    Breast abscess of female    Cervical high risk HPV (human papillomavirus) test positive    Chondromalacia of right patella    Chronic pain of right knee    Gastroesophageal reflux disease    Hamstring tightness of right lower extremity    Gastric banding status    History of smoking    HSV-2 seropositive    Intractable chronic migraine without aura and without status migrainosus    LGSIL (low grade squamous intraepithelial dysplasia)    Morbid obesity    Morbid obesity with body mass index of 50.0-59.9 in adult    Patellofemoral syndrome of both knees    Pes anserinus bursitis of right knee    Primary osteoarthritis of both knees    Pruritic rash    Sepsis    Vitamin D deficiency    Osteoarthritis of multiple joints    Acute cystitis with hematuria    Generalized abdominal pain    KENDRA on CPAP     Bipolar 1 disorder, depressed, moderate    Alcohol use disorder, moderate, in early remission       Allergy:   Allergies   Allergen Reactions    Cephalexin Hives    Penicillins Anaphylaxis and Hives    Prunus Persica Anaphylaxis    Adhesive Tape Other (See Comments)     Pulls skin off    Cefaclor Hives    Codeine Hives    Morphine Hives and Itching    Latex Hives and Rash     Hives and anaphlaxic          Current Medications:   Current Outpatient Medications   Medication Sig Dispense Refill    traZODone (DESYREL) 100 MG tablet Take 1-2 tablets by mouth At Night As Needed for sleep (Patient taking differently: Take 1 tablet PO QHS PRN for sleep) 60 tablet 1    albuterol sulfate  (90 Base) MCG/ACT inhaler Inhale 2 puffs Every 4 (Four) Hours As Needed for Wheezing or Shortness of Air. 18 g 0    cloNIDine (Catapres) 0.1 MG tablet Take 1 tablet by mouth 2 (Two) Times a Day. 180 tablet 0    hydrOXYzine pamoate (Vistaril) 50 MG capsule Take 1-2 capsules by mouth 3 times a day as needed for sleep/anxiety 90 capsule 1    sertraline (Zoloft) 100 MG tablet Take 2 tablets PO Daily 60 tablet 1    valACYclovir (Valtrex) 500 MG tablet Take 1 tablet by mouth 2 (Two) Times a Day. 90 tablet 2     No current facility-administered medications for this visit.       Review of Symptoms:    Review of Systems   Constitutional:  Positive for fatigue.   Psychiatric/Behavioral:  Positive for agitation, decreased concentration, dysphoric mood, sleep disturbance, depressed mood and stress. The patient is nervous/anxious.          Physical Exam:   Due to the remote nature of this encounter (virtual encounter), vitals were unable to be obtained.  Height stated at 64 inches.  Weight stated at 270 pounds.      Physical Exam  Neurological:      Mental Status: She is alert.   Psychiatric:         Attention and Perception: Attention and perception normal. She does not perceive auditory or visual hallucinations.         Mood and Affect: Affect  normal. Mood is depressed.         Speech: Speech normal.         Behavior: Behavior normal. Behavior is cooperative.         Thought Content: Thought content is not paranoid or delusional. Thought content does not include homicidal or suicidal ideation. Thought content does not include homicidal or suicidal plan.         Cognition and Memory: Cognition and memory normal.         Judgment: Judgment is impulsive.           Mental Status Exam:   Hygiene:   good  Cooperation:  Cooperative  Eye Contact:  Fair  Psychomotor Behavior:  Appropriate  Affect:  Appropriate  Mood: sad  Speech:  Normal  Thought Process:  Goal directed  Thought Content:  Mood congruent  Suicidal:  None  Homicidal:  None  Hallucinations:  None  Delusion:  None  Memory:  Intact  Orientation:  Person, Place, Time, and Situation  Reliability:  fair  Insight:  Fair  Judgement:  Fair  Impulse Control:  Fair        Patient Health Questionnaire-9 (PHQ-9) (Depression Screening Tool)  Little interest or pleasure in doing things? (P) 2-->more than half the days   Feeling down, depressed, or hopeless? (P) 2-->more than half the days   Trouble falling or staying asleep, or sleeping too much? (P) 2-->more than half the days   Feeling tired or having little energy? (P) 2-->more than half the days   Poor appetite or overeating? (P) 1-->several days   Feeling bad about yourself - or that you are a failure or have let yourself or your family down? (P) 0-->not at all   Trouble concentrating on things, such as reading the newspaper or watching television? (P) 3-->nearly every day   Moving or speaking so slowly that other people could have noticed? Or the opposite - being so fidgety or restless that you have been moving around a lot more than usual? (P) 1-->several days   Thoughts that you would be better off dead, or of hurting yourself in some way? (P) 0-->not at all   PHQ-9 Total Score (P) 13   If you checked off any problems, how difficult have these problems  made it for you to do your work, take care of things at home, or get along with other people? (P) very difficult     PHQ-9 Total Score: (P) 13      Generalized Anxiety Disorder 7-Item (ALYX-7) Screening Tool  Feeling nervous, anxious or on edge: (P) Nearly every day  Not being able to stop or control worrying: (P) More than half the days  Worrying too much about different things: (P) Nearly every day  Trouble Relaxing: (P) More than half the days  Being so restless that it is hard to sit still: (P) Several days  Feeling afraid as if something awful might happen: (P) More than half the days  Becoming easily annoyed or irritable: (P) Nearly every day  ALYX 7 Total Score: (P) 16  If you checked any problems, how difficult have these problems made it for you to do your work, take care of things at home, or get along with other people: (P) Extremely difficult      Previous Provider notes and available records reviewed by this APRN at today's encounter.         Lab Results:   No visits with results within 1 Month(s) from this visit.   Latest known visit with results is:   Office Visit on 02/15/2024   Component Date Value Ref Range Status    Glucose 02/15/2024 95  65 - 99 mg/dL Final    BUN 02/15/2024 17  6 - 20 mg/dL Final    Creatinine 02/15/2024 0.84  0.57 - 1.00 mg/dL Final    EGFR Result 02/15/2024 89.7  >60.0 mL/min/1.73 Final    Comment: GFR Normal >60  Chronic Kidney Disease <60  Kidney Failure <15      BUN/Creatinine Ratio 02/15/2024 20.2  7.0 - 25.0 Final    Sodium 02/15/2024 138  136 - 145 mmol/L Final    Potassium 02/15/2024 4.2  3.5 - 5.2 mmol/L Final    Chloride 02/15/2024 101  98 - 107 mmol/L Final    Total CO2 02/15/2024 23.2  22.0 - 29.0 mmol/L Final    Calcium 02/15/2024 9.1  8.6 - 10.5 mg/dL Final    Total Protein 02/15/2024 6.3  6.0 - 8.5 g/dL Final    Albumin 02/15/2024 3.9  3.5 - 5.2 g/dL Final    Globulin 02/15/2024 2.4  gm/dL Final    A/G Ratio 02/15/2024 1.6  g/dL Final    Total Bilirubin 02/15/2024  <0.2  0.0 - 1.2 mg/dL Final    Alkaline Phosphatase 02/15/2024 53  39 - 117 U/L Final    AST (SGOT) 02/15/2024 27  1 - 32 U/L Final    ALT (SGPT) 02/15/2024 14  1 - 33 U/L Final    Hemoglobin A1C 02/15/2024 4.90  4.80 - 5.60 % Final    Comment: Hemoglobin A1C Ranges:  Increased Risk for Diabetes  5.7% to 6.4%  Diabetes                     >= 6.5%  Diabetic Goal                < 7.0%      TSH 02/15/2024 1.200  0.270 - 4.200 uIU/mL Final    Free T4 02/15/2024 1.03  0.93 - 1.70 ng/dL Final    Results may be falsely increased if patient taking Biotin.    Thyroid Peroxidase Antibody 02/15/2024 <9  0 - 34 IU/mL Final         Assessment & Plan   Problems Addressed this Visit    None  Visit Diagnoses       Generalized anxiety disorder  (Chronic)   -  Primary    Relevant Medications    sertraline (Zoloft) 100 MG tablet    hydrOXYzine pamoate (Vistaril) 50 MG capsule    Major depressive disorder, recurrent episode, moderate  (Chronic)       Relevant Medications    sertraline (Zoloft) 100 MG tablet    hydrOXYzine pamoate (Vistaril) 50 MG capsule    Moderate alcohol use disorder  (Chronic)       Sleep difficulties  (Chronic)       Relevant Medications    hydrOXYzine pamoate (Vistaril) 50 MG capsule          Diagnoses         Codes Comments    Generalized anxiety disorder    -  Primary ICD-10-CM: F41.1  ICD-9-CM: 300.02     Major depressive disorder, recurrent episode, moderate     ICD-10-CM: F33.1  ICD-9-CM: 296.32     Moderate alcohol use disorder     ICD-10-CM: F10.20  ICD-9-CM: 303.90     Sleep difficulties     ICD-10-CM: G47.9  ICD-9-CM: 780.50             Visit Diagnoses:    ICD-10-CM ICD-9-CM   1. Generalized anxiety disorder  F41.1 300.02   2. Major depressive disorder, recurrent episode, moderate  F33.1 296.32   3. Moderate alcohol use disorder  F10.20 303.90   4. Sleep difficulties  G47.9 780.50           Rule out bipolar disorder, PTSD, and BPD    GOALS:  Short Term Goals: Patient will be compliant with medication,  and patient will have no significant medication related side effects.  Patient will be engaged in psychotherapy as indicated. Patient will report subjective improvement of symptoms.  Long term goals: To stabilize mood and treat/improve subjective symptoms, the patient will stay out of the hospital, the patient will be at an optimal level of functioning, and the patient will take all medications as prescribed.  The patient verbalized understanding and agreement with goals that were mutually set.      TREATMENT PLAN: Continue supportive psychotherapy efforts and medications as indicated.  Medication and treatment options, both pharmacological and non-pharmacological treatment options, discussed during today's visit, including any off label use of medication. Patient acknowledged and verbally consented with current treatment plan and was educated on the importance of compliance with treatment and follow-up appointments.      -Decrease Trazodone to 100 mg PO QHS PRN for sleep (use current supply)  -Increase Zoloft to 200 mg PO Daily for anxiety and depression  -Start Vistaril  mg PO TID PRN for anxiety/sleep  -Discontinue Vraylar (this occurred on 6/20/24)  -Recommended pt restart therapy; pt intends on calling the [facility] to schedule with the therapist she had been seeing  -Abstain from alcohol use. Discussed with pt that combining ETOH with prescribed antidepressants can worsen depression in addition to causing drowsiness, nausea, dizziness, impaired motor control, and increasing risk of cardiovascular events. The combination has the potential to be fatal. Therefore, it is highly recommended that the patient avoid ETOH use while on psychotropic medications. Pt declines IOP.  -This APRN feels pt needs a higher level of care for more definitive diagnoses/addiction medicine as pt recently discontinued the Campral prescribed while inpatient at [facility] and has started drinking again. Pt has an  appointment with psychiatrist, Dr. Isha Vickers, on 8/13/24 and will follow up with her for psychiatric treatment.       MEDICATION ISSUES:  Discussed treatment plan and medication options of prescribed medication as well as the risks, benefits, any black box warnings, and side effects including potential falls, possible impaired driving, and metabolic adversities among others, including any off label use of medication. Patient is agreeable to call the office with any worsening of symptoms or onset of side effects, or if any concerns or questions arise.  The contact information for the office is made available to the patient. Patient is agreeable to call 911 or go to the nearest ER should they begin having any SI/HI, or if any urgent concerns arise.       VERBAL INFORMED CONSENT FOR MEDICATION:  The patient was educated that their proposed/prescribed psychotropic medication(s) has potential risks, side effects, adverse effects, and black box warnings; and these have been discussed with the patient.  The patient has been informed that their treatment and medication dosage is to be individualized, and may even be above or below the recommended range/dosage due to patient individualization and response, but medication is prescribed using a shared decision making approach, and no medication or dosage will be prescribed without the patient's verbal consent.  The reason for the use of the medication including any off label use and alternative modes of treatment other than or in addition to medication has been considered and discussed, the probable consequences of not receiving the proposed treatment have been discussed, and any treatment side effects, black box warnings, and cautions associated with treatment have been discussed with the patient.  The patient is allowed ample time to openly discuss and ask questions regarding the proposed medication(s) and treatment plan and the patient verbalizes understanding the  reasons for the use of the medication, its potential risks and benefits, other alternative treatment(s), and the probable consequences that may occur if the proposed medication is not given.  The patient has been given ample time to ask questions and study the information and find the information to be specific, accurate, and complete.  The patient gives verbal consent for the medication(s) proposed/prescribed, they verbalized understanding that they can refuse and withdraw consent at any time with the assistance of this APRN, and the patient has verbally confirmed that they are aware, and are willing, to take the prescribed medication and follow the treatment plan with the known possible risks, side effect, black box warnings, and any potential medication interactions, and the patient reports they will be worse off without this medication and treatment plan.  The patient is advised to contact this APRN/this office if any questions or concerns arise at any time (at [phone]), or call 911/go to the closest emergency department if needed or outside of office hours.      SUICIDE RISK ASSESSMENT AND SAFETY PLAN: Unalterable demographics and a history of mental health intervention indicate this patient is in a high risk category compared to the general population. At present, the patient denies active SI/HI, intentions, or plans at this time and agrees to seek immediate care should such thoughts develop. The patient verbalizes understanding of how to access emergency care if needed and agrees to do so. Consideration of suicide risk and protective factors such as history, current presentation, individual strengths and weaknesses, psychosocial and environmental stressors and variables, psychiatric illness and symptoms, medical conditions and pain, took place in this interview. Based on those considerations, the patient is determined: within individual baseline and presenting no imminent risk for suicide or homicide.  Other recommendations: The patient does not meet the criteria for inpatient admission and is not a safety risk to self or others at today's visit. Inpatient treatment offers no significant advantages over outpatient treatment for this patient at today's visit.  The patient was given ample time for questions and fully participated in treatment planning.  The patient was encouraged to call the clinic with any questions or concerns.  The patient was informed of access to emergency care. If patient were to develop any significant symptomatology, suicidal ideation, homicidal ideation, any concerns, or feel unsafe at any time they are to call the clinic and if unable to get immediate assistance should immediately call 911 or go to the nearest emergency room.  Patient contracted verbally for the following: If you are experiencing an emotional crisis or have thoughts of harming yourself or others, please go to your nearest local emergency room or call 911. Will continue to re-assess medication response and side effects frequently to establish efficacy and ensure safety. Risks, any black box warnings, side effects, off label usage, and benefits of medication and treatment discussed with patient, along with potential adverse side effects of current and/or newly prescribed medication, alternative treatment options, and OTC medications.  Patient verbalized understanding of potential risks, any off label use of medication, any black box warnings, and any side effects in their own words. The patient verbalized understanding and agreed to comply with the safety plan discussed in their own words.  Patient given the number to the office. Number also discussed of the 24- hour suicide hotline.       MEDS ORDERED DURING VISIT:  New Medications Ordered This Visit   Medications    sertraline (Zoloft) 100 MG tablet     Sig: Take 2 tablets PO Daily     Dispense:  60 tablet     Refill:  1    hydrOXYzine pamoate (Vistaril) 50 MG capsule      Sig: Take 1-2 capsules by mouth 3 times a day as needed for sleep/anxiety     Dispense:  90 capsule     Refill:  1       No follow-ups on file. Pt has an appointment with Dr. Isha Vickers and will follow-up with her for psychiatric treatment     Treatment plan completed: 11/14/23    Progress toward goal: Not at goal    Functional Status: Moderate impairment     Prognosis: Fair with Ongoing Treatment         This document has been electronically signed by CHRIS Gama  July 25, 2024 16:35 EDT    Visit Time (face to face direct patient care):  In/Start Time: 3:23 PM.  Out/Stop: 4:14 PM.  51 minutes of face to face direct patient care with the patient spent in coordination of care and counseling the patient regarding diagnoses and treatment planning. Answered any questions patient had with medication and treatment plan.       Total Time spent by this APRN for today's encounter:  92 total minutes were spent by this APRN on charting/documentation, review of past medical records, direct face to face patient care, coordination of care    Some of the data in this electronic note has been brought forward from a previous encounter, any necessary changes have been made, it has been reviewed by this APRN, and it is accurate.    Please note that portions of this note were completed with a voice recognition program. Efforts were made to edit dictation, but occasionally words are mistranscribed.

## 2024-08-01 DIAGNOSIS — I10 PRIMARY HYPERTENSION: ICD-10-CM

## 2024-08-01 DIAGNOSIS — F41.9 ANXIETY: ICD-10-CM

## 2024-08-06 RX ORDER — CLONIDINE HYDROCHLORIDE 0.1 MG/1
0.1 TABLET ORAL 2 TIMES DAILY
Qty: 180 TABLET | Refills: 0 | Status: SHIPPED | OUTPATIENT
Start: 2024-08-06

## 2024-08-06 RX ORDER — VALACYCLOVIR HYDROCHLORIDE 500 MG/1
500 TABLET, FILM COATED ORAL 2 TIMES DAILY
Qty: 90 TABLET | Refills: 3 | Status: SHIPPED | OUTPATIENT
Start: 2024-08-06

## 2024-08-13 ENCOUNTER — OFFICE VISIT (OUTPATIENT)
Dept: PSYCHIATRY | Facility: CLINIC | Age: 44
End: 2024-08-13
Payer: COMMERCIAL

## 2024-08-13 DIAGNOSIS — F31.32 BIPOLAR 1 DISORDER, DEPRESSED, MODERATE: Primary | Chronic | ICD-10-CM

## 2024-08-13 DIAGNOSIS — F41.1 GENERALIZED ANXIETY DISORDER: Chronic | ICD-10-CM

## 2024-08-13 PROCEDURE — 90792 PSYCH DIAG EVAL W/MED SRVCS: CPT | Performed by: PSYCHIATRY & NEUROLOGY

## 2024-08-13 RX ORDER — METOPROLOL TARTRATE 50 MG/1
50 TABLET, FILM COATED ORAL 2 TIMES DAILY
COMMUNITY

## 2024-08-13 RX ORDER — GABAPENTIN 100 MG/1
100 CAPSULE ORAL 3 TIMES DAILY
Qty: 90 CAPSULE | Refills: 2 | Status: SHIPPED | OUTPATIENT
Start: 2024-08-13 | End: 2025-08-13

## 2024-08-13 NOTE — PROGRESS NOTES
"Subjective   Brittany Sheppard is a 44 y.o. 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   Finished HS in regular classes  The pt was disruptive in school, daydreaming, talking to other students, could not sit still, was always fidgety, moving, procrastination, missing deadlines, \"last minute person\"    The pt experienced similar issues at home, interrupting people, could not wait for her turn to talk   The pt was dsd with ADHD as a child, she was on meds but mother did not like her behavior on meds     In [id number] - the pt noticed mood swings, getting angry, argumentative with co-workers   At that time she did IOP at the [facility]     Last manic episodes - 2 weeks ago (impulsive spending, missing work, multiple projects, feeling invincible, pressured speech) that lasted 6-7 days     Depression 7/10, every day, all day long , associated with decreased E level., poor sleep, poor motivations, guilt feelings, denied AVH/Si/HI, no elicited delusions  Anxiety - intense and persistent   Few panic attacks     Alc 2-3 times per week       The following portions of the patient's history were reviewed and updated as appropriate: allergies, current medications, past family history, past medical history, past social history, past surgical history and problem list.    PAST PSYCHIATRIC HISTORY  [facility]  No inpt  + IOP at [facility] in 2010 2ry to anger issues  and was dsd with bipolar d/o   No SI,no attempts   Axis II  Defer     PAST OUTPATIENT TREATMENT  Diagnosis treated:  Affective Disorder, Anxiety/Panic Disorder  Treatment Type:  Medication Management  Dr Khan - psych on [facility] and worked with therapist there   Prior Psychiatric Medications:  Vraylar - tremor  Wellbutrin XL - " "helped her quit smoking, initially thought it was making her angry  Abilify - insomnia  Atarax - helps with sleep onset  Elavil - diaphoresis, RLS  Seroquel - diaphoresis, gaining weight, prescribed for sleep  Effexor - diaphoresis  Cymbalta - diaphoresis  Celexa - some benefit; was on it for six months and stopped taking it because it made her \"numb\"  Ambien - \"crazy stuff\" at night time  Trazodone - not effective   Campral - GI side effects, dry mouth  Buspar - did not notice a difference on 10 mg PO BID      Support Groups:  Alcoholica Anonymous (AA)  The pt has a sponsor   Sequelae Of Mental Disorder:  job disruption, social isolation, emotional distress      Interval History  Deteriorated    Side Effects  Vraylar - tremor       Past Medical History:  Past Medical History:   Diagnosis Date    Acute recurrent frontal sinusitis 9/1/2022    Anemia     Anxiety     Arthritis     Asthma     Back pain     Bipolar 1 disorder     COPD (chronic obstructive pulmonary disease)     Depression     Dyspnea     Elevated liver enzymes     GERD (gastroesophageal reflux disease)     H. pylori infection     treated 8/2016    Hepatitis C 03/2015    harvoni completed; attributed to tatoo    Hirsutism     History of migraine headaches     with scotomata    HPV in female 04/23/2021    HPV in female 03/2014    HPV in female 10/2015    Hypertension     IBS (irritable bowel syndrome)     Insomnia     LGSIL on Pap smear of cervix 01/2016    cx biopsy confoirmed LGSIL    Obesity     PCOS (polycystic ovarian syndrome)     PCR positive for herpes simplex virus type 1 (HSV-1) DNA 2018    Preop cardiovascular exam 10/10/2014    Sleep apnea     Steatosis of liver     liver biopsy; moderate; stage 3, grade 0    Vitamin D deficiency        Social History:  Social History     Socioeconomic History    Marital status:    Tobacco Use    Smoking status: Former     Current packs/day: 0.00     Types: Cigarettes     Quit date: 2005     Years since " quittin.6     Passive exposure: Never    Smokeless tobacco: Never   Vaping Use    Vaping status: Never Used   Substance and Sexual Activity    Alcohol use: Yes     Comment: occassionally    Drug use: Not Currently     Types: Marijuana    Sexual activity: Yes     Partners: Male, Female     Alc -up to 1 pint of cognac 2-3 times per week   No DUI/PI  Few blackouts  Unable to control amount of consumed alc   The pt just came out of detox program for 8 days at [facility]   No hx of DT, no SZ   Some cravings         Family History:  Family History   Problem Relation Age of Onset    Hypertension Mother     Obesity Mother     Diabetes Mother     Sleep apnea Mother     Bipolar disorder Father     Diabetes Father     Hypertension Father     Migraines Father     Alcohol abuse Father     Stroke Maternal Grandmother     Breast cancer Maternal Grandmother 72    Diabetes Maternal Grandmother     Hypertension Maternal Grandmother     Stroke Maternal Grandfather     Cancer Maternal Grandfather     Stroke Paternal Grandmother     Breast cancer Paternal Grandmother     Hypertension Paternal Grandmother     Pancreatic cancer Paternal Grandmother     Schizophrenia Nephew     Bipolar disorder Nephew        Past Surgical History:  Past Surgical History:   Procedure Laterality Date    APPENDECTOMY  2010    [facility]    BREAST SURGERY      [facility]    D & C HYSTEROSCOPY ENDOMETRIAL ABLATION  2010    menorrhagia; pathology benign    LAPAROSCOPIC GASTRIC BANDING  2017    Dr. Prieto [facility]    LIVER BIOPSY      PILONIDAL CYST / SINUS EXCISION  2008    POLYPECTOMY      TOOTH EXTRACTION  2016    WISDOM TOOTH EXTRACTION         Problem List:  Patient Active Problem List   Diagnosis    Abscess of axilla, left    Lumbago    Acute hepatitis C without hepatic coma    Generalized anxiety disorder    Asthma    Atopic contact dermatitis    Benign hypertension    Breast abscess of female    Cervical high risk HPV (human  papillomavirus) test positive    Chondromalacia of right patella    Chronic pain of right knee    Gastroesophageal reflux disease    Hamstring tightness of right lower extremity    Gastric banding status    History of smoking    HSV-2 seropositive    Intractable chronic migraine without aura and without status migrainosus    LGSIL (low grade squamous intraepithelial dysplasia)    Morbid obesity    Morbid obesity with body mass index of 50.0-59.9 in adult    Patellofemoral syndrome of both knees    Pes anserinus bursitis of right knee    Primary osteoarthritis of both knees    Pruritic rash    Sepsis    Vitamin D deficiency    Osteoarthritis of multiple joints    Acute cystitis with hematuria    Generalized abdominal pain    KENDRA on CPAP    Bipolar 1 disorder, depressed, moderate    Alcohol use disorder, moderate, in early remission       Allergy:   Allergies   Allergen Reactions    Cephalexin Hives    Penicillins Anaphylaxis and Hives    Prunus Persica Anaphylaxis    Adhesive Tape Other (See Comments)     Pulls skin off    Cefaclor Hives    Codeine Hives    Morphine Hives and Itching    Latex Hives and Rash     Hives and anaphlaxic          Discontinued Medications:  There are no discontinued medications.    Current Medications:   Current Outpatient Medications   Medication Sig Dispense Refill    albuterol sulfate  (90 Base) MCG/ACT inhaler Inhale 2 puffs Every 4 (Four) Hours As Needed for Wheezing or Shortness of Air. 18 g 0    cloNIDine (Catapres) 0.1 MG tablet Take 1 tablet by mouth 2 (Two) Times a Day. 180 tablet 0    gabapentin (Neurontin) 100 MG capsule Take 1 capsule by mouth 3 (Three) Times a Day. 90 capsule 2    hydrOXYzine pamoate (Vistaril) 50 MG capsule Take 1-2 capsules by mouth 3 (Three) Times a Day As Needed for Sleep/Anxiety 90 capsule 1    metoprolol tartrate (LOPRESSOR) 50 MG tablet Take 1 tablet by mouth 2 (Two) Times a Day.      sertraline (Zoloft) 100 MG tablet Take 2 tablets by mouth  Daily. 60 tablet 1    traZODone (DESYREL) 100 MG tablet Take 1-2 tablets by mouth At Night As Needed for sleep (Patient taking differently: Take 1 tablet PO QHS PRN for sleep) 60 tablet 1    valACYclovir (Valtrex) 500 MG tablet Take 1 tablet by mouth 2 (Two) Times a Day. 90 tablet 3     No current facility-administered medications for this visit.         Psychological ROS: positive for - anxiety, concentration difficulties, depression, irritability, and mood swings  negative for - hallucinations or suicidal ideation      Physical Exam:   not currently breastfeeding.    Mental Status Exam:   Hygiene:   good  Cooperation:  Cooperative  Eye Contact:  Good  Psychomotor Behavior:  Appropriate  Affect:  Appropriate  Mood: sad, depressed, anxious, irritable, and fluctates  Hopelessness: Denies  Speech:  Normal  Thought Process:  Goal directed and Linear  Thought Content:  Mood congruent  Suicidal:  None  Homicidal:  None  Hallucinations:  None  Delusion:  None  Memory:  Intact  Orientation:  Person, Place, Time, and Situation  Reliability:  fair  Insight:  Fair  Judgement:  Good  Impulse Control:  Fair  Physical/Medical Issues:  Yes          PHQ-9 Depression Screening    Little interest or pleasure in doing things? 2-->more than half the days   Feeling down, depressed, or hopeless? 2-->more than half the days   Trouble falling or staying asleep, or sleeping too much? 1-->several days   Feeling tired or having little energy? 1-->several days   Poor appetite or overeating? 0-->not at all   Feeling bad about yourself - or that you are a failure or have let yourself or your family down? 2-->more than half the days   Trouble concentrating on things, such as reading the newspaper or watching television? 3-->nearly every day   Moving or speaking so slowly that other people could have noticed? Or the opposite - being so fidgety or restless that you have been moving around a lot more than usual? 0-->not at all   Thoughts that you  would be better off dead, or of hurting yourself in some way? 0-->not at all   PHQ-9 Total Score 11   If you checked off any problems, how difficult have these problems made it for you to do your work, take care of things at home, or get along with other people? very difficult      Over the last two weeks, how often have you been bothered by the following problems?  Feeling nervous, anxious or on edge: Nearly every day  Not being able to stop or control worrying: More than half the days  Worrying too much about different things: More than half the days  Trouble Relaxing: More than half the days  Being so restless that it is hard to sit still: More than half the days  Becoming easily annoyed or irritable: Several days  Feeling afraid as if something awful might happen: Nearly every day  ALYX 7 Total Score: 15  If you checked any problems, how difficult have these problems made it for you to do your work, take care of things at home, or get along with other people: Extremely difficult      Former smoker    I advised Brittany of the risks of tobacco use.     Lab Results:   No visits with results within 3 Month(s) from this visit.   Latest known visit with results is:   Office Visit on 02/15/2024   Component Date Value Ref Range Status    Glucose 02/15/2024 95  65 - 99 mg/dL Final    BUN 02/15/2024 17  6 - 20 mg/dL Final    Creatinine 02/15/2024 0.84  0.57 - 1.00 mg/dL Final    EGFR Result 02/15/2024 89.7  >60.0 mL/min/1.73 Final    Comment: GFR Normal >60  Chronic Kidney Disease <60  Kidney Failure <15      BUN/Creatinine Ratio 02/15/2024 20.2  7.0 - 25.0 Final    Sodium 02/15/2024 138  136 - 145 mmol/L Final    Potassium 02/15/2024 4.2  3.5 - 5.2 mmol/L Final    Chloride 02/15/2024 101  98 - 107 mmol/L Final    Total CO2 02/15/2024 23.2  22.0 - 29.0 mmol/L Final    Calcium 02/15/2024 9.1  8.6 - 10.5 mg/dL Final    Total Protein 02/15/2024 6.3  6.0 - 8.5 g/dL Final    Albumin 02/15/2024 3.9  3.5 - 5.2 g/dL Final     Globulin 02/15/2024 2.4  gm/dL Final    A/G Ratio 02/15/2024 1.6  g/dL Final    Total Bilirubin 02/15/2024 <0.2  0.0 - 1.2 mg/dL Final    Alkaline Phosphatase 02/15/2024 53  39 - 117 U/L Final    AST (SGOT) 02/15/2024 27  1 - 32 U/L Final    ALT (SGPT) 02/15/2024 14  1 - 33 U/L Final    Hemoglobin A1C 02/15/2024 4.90  4.80 - 5.60 % Final    Comment: Hemoglobin A1C Ranges:  Increased Risk for Diabetes  5.7% to 6.4%  Diabetes                     >= 6.5%  Diabetic Goal                < 7.0%      TSH 02/15/2024 1.200  0.270 - 4.200 uIU/mL Final    Free T4 02/15/2024 1.03  0.93 - 1.70 ng/dL Final    Results may be falsely increased if patient taking Biotin.    Thyroid Peroxidase Antibody 02/15/2024 <9  0 - 34 IU/mL Final       Assessment & Plan   Problems Addressed this Visit       Generalized anxiety disorder (Chronic)    Relevant Medications    gabapentin (Neurontin) 100 MG capsule    Other Relevant Orders    [facility] - Swab,    Bipolar 1 disorder, depressed, moderate - Primary    Relevant Medications    gabapentin (Neurontin) 100 MG capsule    Other Relevant Orders    [facility] - Swab,     Diagnoses         Codes Comments    Bipolar 1 disorder, depressed, moderate    -  Primary ICD-10-CM: F31.32  ICD-9-CM: 296.52     Generalized anxiety disorder     ICD-10-CM: F41.1  ICD-9-CM: 300.02             Visit Diagnoses:    ICD-10-CM ICD-9-CM   1. Bipolar 1 disorder, depressed, moderate  F31.32 296.52   2. Generalized anxiety disorder  F41.1 300.02       TREATMENT PLAN/GOALS: Continue supportive psychotherapy efforts and medications as indicated. Treatment and medication options discussed during today's visit. Patient ackowledged and verbally consented to continue with current treatment plan and was educated on the importance of compliance with treatment and follow-up appointments.    MEDICATION ISSUES:  INSPECT reviewed as expected - 12/26/23 hydrocodone # 18 for 3 days   PHQ scored 11 - moderate depression   ALYX 7 scored  15  MDQ scored 13 (bipolar likely)  Patient screened positive for depression based on a PHQ-9 score of 11 on 8/13/2024. Follow-up recommendations include:  mood stabilizers .     Bipolar d/o, MRE depressed , moderate - the pt is currently on sertraline and trazodone, did not tolerate vraylar, and failed numerous med trials , will benefit from genesight   ALYX - add gabapentin 100 mg po TID for anxiety and cravings   ADHD  combined  -consider strattera or qelbree when mood is stabilized       Short term goals - to establish therapeutic rapport, to stay compliant with meds   Long term goals - to improve sxs , to stay at optimal level of functioning       Discussed medication options and treatment plan of prescribed medication as well as the risks, benefits, and side effects including potential falls, possible impaired driving and metabolic adversities among others. Patient is agreeable to call the office with any worsening of symptoms or onset of side effects. Patient is agreeable to call 911 or go to the nearest ER should he/she begin having SI/HI. No medication side effects or related complaints today.     MEDS ORDERED DURING VISIT:  New Medications Ordered This Visit   Medications    gabapentin (Neurontin) 100 MG capsule     Sig: Take 1 capsule by mouth 3 (Three) Times a Day.     Dispense:  90 capsule     Refill:  2       Return in about 3 weeks (around 9/3/2024).           This document has been electronically signed by Isha Vickers MD  August 13, 2024 11:37 EDT    Part of this note may be an electronic transcription/translation of spoken language to printed text using the Dragon Dictation System.

## 2024-08-21 NOTE — PROGRESS NOTES
"    I N T E R N A L  M E D I C I N E  Jane Buenrostro, APRN    ENCOUNTER DATE:  08/22/2024    Brittany Sheppard / 44 y.o. / female      CHIEF COMPLAINT / REASON FOR OFFICE VISIT     Hypertension      ASSESSMENT & PLAN     Diagnoses and all orders for this visit:    1. Primary hypertension (Primary)  -     Basic metabolic panel  -     metoprolol succinate XL (Toprol XL) 25 MG 24 hr tablet; Take 1 tablet by mouth Daily.  Dispense: 30 tablet; Refill: 0         SUMMARY/DISCUSSION  BP is no longer well controlled.  We discussed that since she has only been taking metoprolol tartrate once daily, this will likely contribute to her BP readings being more labile because this medication is normally dosed BID.  Change to metoprolol succinate XL 25 mg daily.  She will send me daily BP readings via [employer] for review in a few days.  May need to increase to 50 mg daily.  Encourage low sodium diet, reduced stress, weight loss.  Follow up with psychiatry as scheduled.      Next Appointment with me: Visit date not found    Return in about 3 months (around 11/22/2024) for Annual physical.      VITAL SIGNS     Visit Vitals  /84   Pulse 94   Temp 97.5 °F (36.4 °C)   Ht 162.6 cm (64.02\")   Wt 130 kg (286 lb)   LMP 07/01/2024 (Exact Date)   SpO2 98%   BMI 49.06 kg/m²             Wt Readings from Last 3 Encounters:   08/22/24 130 kg (286 lb)   06/28/24 124 kg (274 lb 6.4 oz)   06/20/24 127 kg (279 lb)     Body mass index is 49.06 kg/m².        MEDICATIONS AT THE TIME OF OFFICE VISIT     Current Outpatient Medications on File Prior to Visit   Medication Sig Dispense Refill    albuterol sulfate  (90 Base) MCG/ACT inhaler Inhale 2 puffs Every 4 (Four) Hours As Needed for Wheezing or Shortness of Air. 18 g 0    gabapentin (Neurontin) 100 MG capsule Take 1 capsule by mouth 3 (Three) Times a Day. 90 capsule 2    hydrOXYzine pamoate (Vistaril) 50 MG capsule Take 1-2 capsules by mouth 3 (Three) Times a Day As Needed for Sleep/Anxiety 90 " capsule 1    sertraline (Zoloft) 100 MG tablet Take 2 tablets by mouth Daily. 60 tablet 1    traZODone (DESYREL) 100 MG tablet Take 1-2 tablets by mouth At Night As Needed for sleep (Patient taking differently: Take 1 tablet PO QHS PRN for sleep) 60 tablet 1    valACYclovir (Valtrex) 500 MG tablet Take 1 tablet by mouth 2 (Two) Times a Day. 90 tablet 3     No current facility-administered medications on file prior to visit.        HISTORY OF PRESENT ILLNESS     HTN: Today's BP is elevated, 154/84.  Stopped taking clonidine 0.1 mg BID about three weeks ago by her own decision due to perceived lack of effectiveness.  On Metoprolol tartrate 50 mg BID, but usually only taking once daily.  She tells me her BP is labile, averaging 150-140/80s.      Bipolar 1 with current depression:  Now seeing psychiatry, Isha Mcneill MD.  Next appointment is September 6, 2024.  On sertraline 200 mg daily, hydroxyzine  mg up to TID (couple times weekly), trazodone 100-150 mg nightly PRN for benefit of sleep, gabapentin 100 mg TID.  Mood is stable overall, no SI/ HI.  Completed [facility] testing and is awaiting test results.       She still has plans to re establish with rheumatology office to discuss possible prior RA diagnosis.      Patient Care Team:  Jane Buenrostro APRN as PCP - General (Family Medicine)  Lilia Joshua PA-C as Physician Assistant (Colon and Rectal Surgery)  Erika Craig APRN as Nurse Practitioner (Behavioral Health)    REVIEW OF SYSTEMS     Review of Systems   Constitutional:  Negative for chills, fever and unexpected weight change.   Respiratory:  Negative for cough, chest tightness and shortness of breath.    Cardiovascular:  Negative for chest pain, palpitations and leg swelling.   Neurological:  Negative for dizziness, weakness, light-headedness and headaches.   Psychiatric/Behavioral:  Positive for dysphoric mood. Negative for self-injury and suicidal ideas. The patient is nervous/anxious.            PHYSICAL EXAMINATION     Physical Exam  Vitals reviewed.   Constitutional:       General: She is not in acute distress.     Appearance: Normal appearance. She is not ill-appearing, toxic-appearing or diaphoretic.   HENT:      Head: Normocephalic and atraumatic.   Cardiovascular:      Rate and Rhythm: Normal rate and regular rhythm.      Heart sounds: Normal heart sounds.   Pulmonary:      Effort: Pulmonary effort is normal.      Breath sounds: Normal breath sounds.   Musculoskeletal:      Right lower leg: No edema.      Left lower leg: No edema.   Neurological:      Mental Status: She is alert and oriented to person, place, and time. Mental status is at baseline.   Psychiatric:         Mood and Affect: Mood normal.         Behavior: Behavior normal.         Thought Content: Thought content normal.         Judgment: Judgment normal.           REVIEWED DATA     Labs:           Imaging:            Medical Tests:           Summary of old records / correspondence / consultant report:           Request outside records:

## 2024-08-22 ENCOUNTER — OFFICE VISIT (OUTPATIENT)
Dept: INTERNAL MEDICINE | Age: 44
End: 2024-08-22
Payer: COMMERCIAL

## 2024-08-22 VITALS
TEMPERATURE: 97.5 F | DIASTOLIC BLOOD PRESSURE: 84 MMHG | SYSTOLIC BLOOD PRESSURE: 154 MMHG | HEIGHT: 64 IN | OXYGEN SATURATION: 98 % | WEIGHT: 286 LBS | BODY MASS INDEX: 48.83 KG/M2 | HEART RATE: 94 BPM

## 2024-08-22 DIAGNOSIS — I10 PRIMARY HYPERTENSION: Primary | ICD-10-CM

## 2024-08-22 PROCEDURE — 99214 OFFICE O/P EST MOD 30 MIN: CPT

## 2024-08-22 RX ORDER — METOPROLOL SUCCINATE 25 MG/1
25 TABLET, EXTENDED RELEASE ORAL DAILY
Qty: 30 TABLET | Refills: 0 | Status: SHIPPED | OUTPATIENT
Start: 2024-08-22

## 2024-08-26 ENCOUNTER — TELEPHONE (OUTPATIENT)
Dept: FAMILY MEDICINE CLINIC | Facility: CLINIC | Age: 44
End: 2024-08-26
Payer: COMMERCIAL

## 2024-08-26 LAB
BUN SERPL-MCNC: 12 MG/DL (ref 6–20)
BUN/CREAT SERPL: 13.2 (ref 7–25)
CALCIUM SERPL-MCNC: 9 MG/DL (ref 8.6–10.5)
CHLORIDE SERPL-SCNC: 98 MMOL/L (ref 98–107)
CO2 SERPL-SCNC: 27.9 MMOL/L (ref 22–29)
CREAT SERPL-MCNC: 0.91 MG/DL (ref 0.57–1)
EGFRCR SERPLBLD CKD-EPI 2021: 80.9 ML/MIN/1.73
GLUCOSE SERPL-MCNC: 101 MG/DL (ref 65–99)
POTASSIUM SERPL-SCNC: 4 MMOL/L (ref 3.5–5.2)
SODIUM SERPL-SCNC: 136 MMOL/L (ref 136–145)

## 2024-09-04 RX ORDER — METOPROLOL SUCCINATE 50 MG/1
50 TABLET, EXTENDED RELEASE ORAL DAILY
Qty: 30 TABLET | Refills: 0 | Status: SHIPPED | OUTPATIENT
Start: 2024-09-04

## 2024-09-06 ENCOUNTER — OFFICE VISIT (OUTPATIENT)
Dept: PSYCHIATRY | Facility: CLINIC | Age: 44
End: 2024-09-06
Payer: COMMERCIAL

## 2024-09-06 DIAGNOSIS — F31.32 BIPOLAR 1 DISORDER, DEPRESSED, MODERATE: Primary | Chronic | ICD-10-CM

## 2024-09-06 DIAGNOSIS — F41.1 GENERALIZED ANXIETY DISORDER: Chronic | ICD-10-CM

## 2024-09-06 DIAGNOSIS — F10.21 ALCOHOL USE DISORDER, MODERATE, IN EARLY REMISSION: Chronic | ICD-10-CM

## 2024-09-06 RX ORDER — SERTRALINE HYDROCHLORIDE 100 MG/1
200 TABLET, FILM COATED ORAL DAILY
Qty: 60 TABLET | Refills: 1 | Status: SHIPPED | OUTPATIENT
Start: 2024-09-06

## 2024-09-06 RX ORDER — GABAPENTIN 100 MG/1
100 CAPSULE ORAL 3 TIMES DAILY
Qty: 90 CAPSULE | Refills: 2 | Status: SHIPPED | OUTPATIENT
Start: 2024-09-06 | End: 2025-09-06

## 2024-09-06 NOTE — PROGRESS NOTES
"Subjective   Brittany Sheppard is a 44 y.o. 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   Finished HS in regular classes  The pt was disruptive in school, daydreaming, talking to other students, could not sit still, was always fidgety, moving, procrastination, missing deadlines, \"last minute person\"    The pt experienced similar issues at home, interrupting people, could not wait for her turn to talk   The pt was dsd with ADHD as a child, she was on meds but mother did not like her behavior on meds   In [id number] - the pt noticed mood swings, getting angry, argumentative with co-workers   At that time she did IOP at the [facility]   Last manic episodes - 2 weeks ago (impulsive spending, missing work, multiple projects, feeling invincible, pressured speech) that lasted 6-7 days   Depression 7/10, every day, all day long , associated with decreased E level., poor sleep, poor motivations, guilt feelings, denied AVH/Si/HI, no elicited delusions  Anxiety - intense and persistent   Few panic attacks   Alc 1 pint 2-3 times per week     Last visit the pt was started on gabapentin and she noticed decreased irritability, less cravings     Today the pt presented with her spouse to discuss genesight results  The pt gave permission to discuss sensitive information in his presence     The pt remains depressed, 6-7/10, denied feeling hopeless/helpless, denied AVH/Si/HI  Sleep - fragmented, taking trazodone PRN only          The following portions of the patient's history were reviewed and updated as appropriate: allergies, current medications, past family history, past medical history, past social history, past surgical history and problem list.    PAST PSYCHIATRIC " "HISTORY  [place]  No inpt  + IOP at the [facility] in 2010 2ry to anger issues  and was dsd with bipolar d/o   No SI,no attempts   Axis II  Defer     PAST OUTPATIENT TREATMENT  Diagnosis treated:  Affective Disorder, Anxiety/Panic Disorder  Treatment Type:  Medication Management  Dr Khan - psych on [address] and worked with therapist there   Prior Psychiatric Medications:  Vraylar - tremor  Wellbutrin XL - helped her quit smoking, initially thought it was making her angry  Abilify - insomnia  Atarax - helps with sleep onset  Elavil - diaphoresis, RLS  Seroquel - diaphoresis, gaining weight, prescribed for sleep  Effexor - diaphoresis  Cymbalta - diaphoresis  Celexa - some benefit; was on it for six months and stopped taking it because it made her \"numb\"  Ambien - \"crazy stuff\" at night time  Trazodone - not effective   Campral - GI side effects, dry mouth  Buspar - did not notice a difference on 10 mg PO BID    LABORATORY - SCAN - [facility] LAB RESULTS, [facility], 08/13/2024 (08/13/2024)       Support Groups:  Alcoholica Anonymous (AA)  The pt has a sponsor   Sequelae Of Mental Disorder:  job disruption, social isolation, emotional distress      Interval History  No changes     Side Effects  Denied       Past Medical History:  Past Medical History:   Diagnosis Date    Acute recurrent frontal sinusitis 9/1/2022    Anemia     Anxiety     Arthritis     Asthma     Back pain     Bipolar 1 disorder     COPD (chronic obstructive pulmonary disease)     Depression     Dyspnea     Elevated liver enzymes     GERD (gastroesophageal reflux disease)     H. pylori infection     treated 8/2016    Hepatitis C 03/2015    harvoni completed; attributed to tatoo    Hirsutism     History of migraine headaches     with scotomata    HPV in female 04/23/2021    HPV in female 03/2014    HPV in female 10/2015    Hypertension     IBS (irritable bowel syndrome)     Insomnia     LGSIL on Pap smear of cervix 01/2016    cx biopsy confoirmed LGSIL    " Obesity     PCOS (polycystic ovarian syndrome)     PCR positive for herpes simplex virus type 1 (HSV-1) DNA 2018    Preop cardiovascular exam 10/10/2014    Sleep apnea     Steatosis of liver     liver biopsy; moderate; stage 3, grade 0    Vitamin D deficiency        Social History:  Social History     Socioeconomic History    Marital status:    Tobacco Use    Smoking status: Former     Current packs/day: 0.00     Types: Cigarettes     Quit date:      Years since quittin.6     Passive exposure: Never    Smokeless tobacco: Never   Vaping Use    Vaping status: Never Used   Substance and Sexual Activity    Alcohol use: Yes     Comment: occassionally    Drug use: Not Currently     Types: Marijuana    Sexual activity: Yes     Partners: Male, Female     Alc -up to 1 pint of cognac 2-3 times per week   No DUI/PI  Few blackouts  Unable to control amount of consumed alc   The pt just came out of detox program for 8 days at [facility]   No hx of DT, no SZ   Some cravings         Family History:  Family History   Problem Relation Age of Onset    Hypertension Mother     Obesity Mother     Diabetes Mother     Sleep apnea Mother     Bipolar disorder Father     Diabetes Father     Hypertension Father     Migraines Father     Alcohol abuse Father     Stroke Maternal Grandmother     Breast cancer Maternal Grandmother 72    Diabetes Maternal Grandmother     Hypertension Maternal Grandmother     Stroke Maternal Grandfather     Cancer Maternal Grandfather     Stroke Paternal Grandmother     Breast cancer Paternal Grandmother     Hypertension Paternal Grandmother     Pancreatic cancer Paternal Grandmother     Schizophrenia Nephew     Bipolar disorder Nephew        Past Surgical History:  Past Surgical History:   Procedure Laterality Date    APPENDECTOMY  2010    [facility]    BREAST SURGERY      [facility]    D & C HYSTEROSCOPY ENDOMETRIAL ABLATION  2010    menorrhagia; pathology benign    LAPAROSCOPIC GASTRIC  BANDING  03/27/2017    Dr. Prieto [facility]    LIVER BIOPSY      PILONIDAL CYST / SINUS EXCISION  09/2008    POLYPECTOMY      TOOTH EXTRACTION  2016    WISDOM TOOTH EXTRACTION  2012       Problem List:  Patient Active Problem List   Diagnosis    Abscess of axilla, left    Lumbago    Acute hepatitis C without hepatic coma    Generalized anxiety disorder    Asthma    Atopic contact dermatitis    Benign hypertension    Breast abscess of female    Cervical high risk HPV (human papillomavirus) test positive    Chondromalacia of right patella    Chronic pain of right knee    Gastroesophageal reflux disease    Hamstring tightness of right lower extremity    Gastric banding status    History of smoking    HSV-2 seropositive    Intractable chronic migraine without aura and without status migrainosus    LGSIL (low grade squamous intraepithelial dysplasia)    Morbid obesity    Morbid obesity with body mass index of 50.0-59.9 in adult    Patellofemoral syndrome of both knees    Pes anserinus bursitis of right knee    Primary osteoarthritis of both knees    Pruritic rash    Sepsis    Vitamin D deficiency    Osteoarthritis of multiple joints    Acute cystitis with hematuria    Generalized abdominal pain    KENDRA on CPAP    Bipolar 1 disorder, depressed, moderate    Alcohol use disorder, moderate, in early remission       Allergy:   Allergies   Allergen Reactions    Cephalexin Hives    Penicillins Anaphylaxis and Hives    Prunus Persica Anaphylaxis    Adhesive Tape Other (See Comments)     Pulls skin off    Cefaclor Hives    Codeine Hives    Morphine Hives and Itching    Latex Hives and Rash     Hives and anaphlaxic          Discontinued Medications:  Medications Discontinued During This Encounter   Medication Reason    sertraline (Zoloft) 100 MG tablet Reorder    gabapentin (Neurontin) 100 MG capsule Reorder       Current Medications:   Current Outpatient Medications   Medication Sig Dispense Refill    gabapentin (Neurontin) 100 MG  capsule Take 1 capsule by mouth 3 (Three) Times a Day. 90 capsule 2    sertraline (Zoloft) 100 MG tablet Take 2 tablets by mouth Daily. 60 tablet 1    albuterol sulfate  (90 Base) MCG/ACT inhaler Inhale 2 puffs Every 4 (Four) Hours As Needed for Wheezing or Shortness of Air. 18 g 0    hydrOXYzine pamoate (Vistaril) 50 MG capsule Take 1-2 capsules by mouth 3 (Three) Times a Day As Needed for Sleep/Anxiety 90 capsule 1    Lumateperone Tosylate 10.5 MG capsule Take 1 capsule by mouth Every Night. 21 capsule 0    metoprolol succinate XL (TOPROL-XL) 50 MG 24 hr tablet Take 1 tablet by mouth Daily. 30 tablet 0    traZODone (DESYREL) 100 MG tablet Take 1-2 tablets by mouth At Night As Needed for sleep (Patient taking differently: Take 1 tablet PO QHS PRN for sleep) 60 tablet 1    valACYclovir (Valtrex) 500 MG tablet Take 1 tablet by mouth 2 (Two) Times a Day. 90 tablet 3     No current facility-administered medications for this visit.         Psychological ROS: positive for - anxiety, concentration difficulties, depression, irritability, and mood swings  negative for - hallucinations or suicidal ideation      Physical Exam:   Last menstrual period 07/01/2024, not currently breastfeeding.    Mental Status Exam:   Hygiene:   good  Cooperation:  Cooperative  Eye Contact:  Good  Psychomotor Behavior:  Appropriate  Affect:  Appropriate  Mood: sad, anxious, and fluctates  Hopelessness: Denies  Speech:  Normal  Thought Process:  Goal directed and Linear  Thought Content:  Mood congruent  Suicidal:  None  Homicidal:  None  Hallucinations:  None  Delusion:  None  Memory:  Intact  Orientation:  Person, Place, Time, and Situation  Reliability:  fair  Insight:  Fair  Judgement:  Good  Impulse Control:  Fair  Physical/Medical Issues:  Yes        MSE from 8/13/24 reviewed and accepted with changes     PHQ-9 Depression Screening    Little interest or pleasure in doing things? 3-->nearly every day   Feeling down, depressed, or  hopeless? 2-->more than half the days   Trouble falling or staying asleep, or sleeping too much? 2-->more than half the days   Feeling tired or having little energy? 2-->more than half the days   Poor appetite or overeating? 0-->not at all   Feeling bad about yourself - or that you are a failure or have let yourself or your family down? 2-->more than half the days   Trouble concentrating on things, such as reading the newspaper or watching television? 3-->nearly every day   Moving or speaking so slowly that other people could have noticed? Or the opposite - being so fidgety or restless that you have been moving around a lot more than usual? 0-->not at all   Thoughts that you would be better off dead, or of hurting yourself in some way? 0-->not at all   PHQ-9 Total Score 14   If you checked off any problems, how difficult have these problems made it for you to do your work, take care of things at home, or get along with other people? very difficult      Over the last two weeks, how often have you been bothered by the following problems?  Feeling nervous, anxious or on edge: More than half the days  Not being able to stop or control worrying: More than half the days  Worrying too much about different things: More than half the days  Trouble Relaxing: More than half the days  Being so restless that it is hard to sit still: Several days  Becoming easily annoyed or irritable: More than half the days  Feeling afraid as if something awful might happen: More than half the days  ALYX 7 Total Score: 13  If you checked any problems, how difficult have these problems made it for you to do your work, take care of things at home, or get along with other people: Very difficult      Former smoker    I advised Brittany of the risks of tobacco use.     Lab Results:   Office Visit on 08/22/2024   Component Date Value Ref Range Status    Glucose 08/26/2024 101 (H)  65 - 99 mg/dL Final    BUN 08/26/2024 12  6 - 20 mg/dL Final     Creatinine 08/26/2024 0.91  0.57 - 1.00 mg/dL Final    EGFR Result 08/26/2024 80.9  >60.0 mL/min/1.73 Final    Comment: GFR Normal >60  Chronic Kidney Disease <60  Kidney Failure <15      BUN/Creatinine Ratio 08/26/2024 13.2  7.0 - 25.0 Final    Sodium 08/26/2024 136  136 - 145 mmol/L Final    Potassium 08/26/2024 4.0  3.5 - 5.2 mmol/L Final    Chloride 08/26/2024 98  98 - 107 mmol/L Final    Total CO2 08/26/2024 27.9  22.0 - 29.0 mmol/L Final    Calcium 08/26/2024 9.0  8.6 - 10.5 mg/dL Final       Assessment & Plan   Problems Addressed this Visit       Generalized anxiety disorder (Chronic)    Relevant Medications    Lumateperone Tosylate 10.5 MG capsule    gabapentin (Neurontin) 100 MG capsule    sertraline (Zoloft) 100 MG tablet    Bipolar 1 disorder, depressed, moderate - Primary    Relevant Medications    Lumateperone Tosylate 10.5 MG capsule    gabapentin (Neurontin) 100 MG capsule    sertraline (Zoloft) 100 MG tablet    Alcohol use disorder, moderate, in early remission (Chronic)     Diagnoses         Codes Comments    Bipolar 1 disorder, depressed, moderate    -  Primary ICD-10-CM: F31.32  ICD-9-CM: 296.52     Generalized anxiety disorder     ICD-10-CM: F41.1  ICD-9-CM: 300.02     Alcohol use disorder, moderate, in early remission     ICD-10-CM: F10.21  ICD-9-CM: 303.93             Visit Diagnoses:    ICD-10-CM ICD-9-CM   1. Bipolar 1 disorder, depressed, moderate  F31.32 296.52   2. Generalized anxiety disorder  F41.1 300.02   3. Alcohol use disorder, moderate, in early remission  F10.21 303.93         TREATMENT PLAN/GOALS: Continue supportive psychotherapy efforts and medications as indicated. Treatment and medication options discussed during today's visit. Patient ackowledged and verbally consented to continue with current treatment plan and was educated on the importance of compliance with treatment and follow-up appointments.    MEDICATION ISSUES:  INSPECT reviewed as expected - 12/26/23 hydrocodone #  18 for 3 days,   8/13/24 gabapentin    PHQ scored 14 - moderate depression   ALYX 7 scored 13  MDQ scored 13 (bipolar likely)  Patient screened positive for depression based on a PHQ-9 score of 14 on 9/6/2024. Follow-up recommendations include:  mood stabilizers .     Bipolar d/o, MRE depressed , moderate - the pt is currently on sertraline and trazodone, did not tolerate vraylar, and failed numerous med trials , start caplyta 10.5 mg po QHS , it has benign metabolic side effect profile   ALYX - cont gabapentin 100 mg po TID for anxiety and cravings   ADHD  combined  -consider strattera or qelbree when mood is stabilized     Genesight discussed - N folic acid conversion, caplyta,sertraline  - use as directed      Short term goals - to establish therapeutic rapport, to stay compliant with meds   Long term goals - to improve sxs , to stay at optimal level of functioning       Discussed medication options and treatment plan of prescribed medication as well as the risks, benefits, and side effects including potential falls, possible impaired driving and metabolic adversities among others. Patient is agreeable to call the office with any worsening of symptoms or onset of side effects. Patient is agreeable to call 911 or go to the nearest ER should he/she begin having SI/HI. No medication side effects or related complaints today.     MEDS ORDERED DURING VISIT:  New Medications Ordered This Visit   Medications    Lumateperone Tosylate 10.5 MG capsule     Sig: Take 1 capsule by mouth Every Night.     Dispense:  21 capsule     Refill:  0     Order Specific Question:   Lot Number?     Answer:   [id number]     Order Specific Question:   Expiration Date?     Answer:   12/31/2025     Order Specific Question:   Quantity     Answer:   14    gabapentin (Neurontin) 100 MG capsule     Sig: Take 1 capsule by mouth 3 (Three) Times a Day.     Dispense:  90 capsule     Refill:  2    sertraline (Zoloft) 100 MG tablet     Sig: Take 2 tablets by  mouth Daily.     Dispense:  60 tablet     Refill:  1       Return in about 4 weeks (around 10/4/2024).           This document has been electronically signed by Isha Vickers MD  September 6, 2024 09:14 EDT    Part of this note may be an electronic transcription/translation of spoken language to printed text using the Dragon Dictation System.

## 2024-09-17 ENCOUNTER — APPOINTMENT (OUTPATIENT)
Dept: ULTRASOUND IMAGING | Facility: HOSPITAL | Age: 44
End: 2024-09-17
Payer: COMMERCIAL

## 2024-09-17 ENCOUNTER — HOSPITAL ENCOUNTER (EMERGENCY)
Facility: HOSPITAL | Age: 44
Discharge: HOME | End: 2024-09-17
Attending: EMERGENCY MEDICINE | Admitting: EMERGENCY MEDICINE
Payer: COMMERCIAL

## 2024-09-17 ENCOUNTER — APPOINTMENT (OUTPATIENT)
Dept: GENERAL RADIOLOGY | Facility: HOSPITAL | Age: 44
End: 2024-09-17
Payer: COMMERCIAL

## 2024-09-17 VITALS
RESPIRATION RATE: 18 BRPM | HEART RATE: 87 BPM | TEMPERATURE: 97.5 F | SYSTOLIC BLOOD PRESSURE: 155 MMHG | WEIGHT: 270 LBS | DIASTOLIC BLOOD PRESSURE: 94 MMHG | HEIGHT: 64 IN | OXYGEN SATURATION: 98 % | BODY MASS INDEX: 46.1 KG/M2

## 2024-09-17 DIAGNOSIS — M25.562 ACUTE PAIN OF LEFT KNEE: Primary | ICD-10-CM

## 2024-09-17 PROCEDURE — 73560 X-RAY EXAM OF KNEE 1 OR 2: CPT

## 2024-09-17 PROCEDURE — 99284 EMERGENCY DEPT VISIT MOD MDM: CPT

## 2024-09-17 PROCEDURE — 99283 EMERGENCY DEPT VISIT LOW MDM: CPT | Performed by: PHYSICIAN ASSISTANT

## 2024-09-17 PROCEDURE — 93971 EXTREMITY STUDY: CPT

## 2024-09-17 RX ORDER — HYDROCODONE BITARTRATE AND ACETAMINOPHEN 7.5; 325 MG/1; MG/1
1 TABLET ORAL ONCE
Status: COMPLETED | OUTPATIENT
Start: 2024-09-17 | End: 2024-09-17

## 2024-09-17 RX ORDER — METHOCARBAMOL 750 MG/1
750 TABLET, FILM COATED ORAL 3 TIMES DAILY
Qty: 21 TABLET | Refills: 0 | Status: SHIPPED | OUTPATIENT
Start: 2024-09-17

## 2024-09-17 RX ADMIN — HYDROCODONE BITARTRATE AND ACETAMINOPHEN 1 TABLET: 7.5; 325 TABLET ORAL at 15:21

## 2024-09-18 ENCOUNTER — OFFICE VISIT (OUTPATIENT)
Dept: ORTHOPEDIC SURGERY | Facility: CLINIC | Age: 44
End: 2024-09-18
Payer: COMMERCIAL

## 2024-09-18 VITALS — TEMPERATURE: 97.5 F | BODY MASS INDEX: 46.98 KG/M2 | WEIGHT: 282 LBS | HEIGHT: 65 IN

## 2024-09-18 DIAGNOSIS — S86.112A STRAIN OF GASTROCNEMIUS MUSCLE OF LEFT LOWER EXTREMITY, INITIAL ENCOUNTER: Primary | ICD-10-CM

## 2024-09-18 DIAGNOSIS — M17.12 PRIMARY OSTEOARTHRITIS OF LEFT KNEE: ICD-10-CM

## 2024-09-18 DIAGNOSIS — R52 PAIN: ICD-10-CM

## 2024-09-20 DIAGNOSIS — M17.12 PRIMARY OSTEOARTHRITIS OF LEFT KNEE: Primary | ICD-10-CM

## 2024-09-20 DIAGNOSIS — S86.112A STRAIN OF GASTROCNEMIUS MUSCLE OF LEFT LOWER EXTREMITY, INITIAL ENCOUNTER: ICD-10-CM

## 2024-09-24 ENCOUNTER — CONSULT (OUTPATIENT)
Dept: BARIATRICS/WEIGHT MGMT | Facility: CLINIC | Age: 44
End: 2024-09-24
Payer: COMMERCIAL

## 2024-09-24 VITALS
HEIGHT: 65 IN | HEART RATE: 84 BPM | DIASTOLIC BLOOD PRESSURE: 88 MMHG | TEMPERATURE: 96.7 F | WEIGHT: 279 LBS | BODY MASS INDEX: 46.48 KG/M2 | SYSTOLIC BLOOD PRESSURE: 118 MMHG

## 2024-09-24 DIAGNOSIS — G47.33 OSA ON CPAP: ICD-10-CM

## 2024-09-24 DIAGNOSIS — K21.9 GASTROESOPHAGEAL REFLUX DISEASE, UNSPECIFIED WHETHER ESOPHAGITIS PRESENT: ICD-10-CM

## 2024-09-24 DIAGNOSIS — Z01.818 PRE-OP EVALUATION: ICD-10-CM

## 2024-09-24 DIAGNOSIS — I10 BENIGN HYPERTENSION: ICD-10-CM

## 2024-09-24 DIAGNOSIS — E28.2 PCOS (POLYCYSTIC OVARIAN SYNDROME): ICD-10-CM

## 2024-09-24 DIAGNOSIS — E66.01 OBESITY, CLASS III, BMI 40-49.9 (MORBID OBESITY): Primary | ICD-10-CM

## 2024-09-24 PROBLEM — Z71.3 DIETARY COUNSELING: Status: ACTIVE | Noted: 2024-09-24

## 2024-09-24 PROBLEM — E66.813 OBESITY, CLASS III, BMI 40-49.9 (MORBID OBESITY): Status: ACTIVE | Noted: 2024-09-24

## 2024-09-24 PROBLEM — M79.7 FIBROMYALGIA: Status: ACTIVE | Noted: 2024-09-24

## 2024-09-24 PROBLEM — K76.0 FATTY LIVER: Status: ACTIVE | Noted: 2024-09-24

## 2024-09-24 PROCEDURE — 99215 OFFICE O/P EST HI 40 MIN: CPT | Performed by: NURSE PRACTITIONER

## 2024-09-24 PROCEDURE — 99417 PROLNG OP E/M EACH 15 MIN: CPT | Performed by: NURSE PRACTITIONER

## 2024-09-24 RX ORDER — CALCIUM CARBONATE 500 MG/1
1 TABLET, CHEWABLE ORAL AS NEEDED
COMMUNITY

## 2024-09-24 RX ORDER — SODIUM CHLORIDE, SODIUM LACTATE, POTASSIUM CHLORIDE, CALCIUM CHLORIDE 600; 310; 30; 20 MG/100ML; MG/100ML; MG/100ML; MG/100ML
30 INJECTION, SOLUTION INTRAVENOUS CONTINUOUS
OUTPATIENT
Start: 2024-09-24

## 2024-09-24 RX ORDER — PHENOL 1.4 %
1 AEROSOL, SPRAY (ML) MUCOUS MEMBRANE NIGHTLY
COMMUNITY

## 2024-09-26 ENCOUNTER — TELEPHONE (OUTPATIENT)
Dept: PSYCHIATRY | Facility: CLINIC | Age: 44
End: 2024-09-26
Payer: COMMERCIAL

## 2024-09-26 DIAGNOSIS — F31.32 BIPOLAR 1 DISORDER, DEPRESSED, MODERATE: Chronic | ICD-10-CM

## 2024-09-26 RX ORDER — METOPROLOL SUCCINATE 50 MG/1
50 TABLET, EXTENDED RELEASE ORAL DAILY
Qty: 30 TABLET | Refills: 5 | Status: CANCELLED | OUTPATIENT
Start: 2024-09-26

## 2024-09-27 ENCOUNTER — APPOINTMENT (OUTPATIENT)
Dept: MRI IMAGING | Facility: HOSPITAL | Age: 44
End: 2024-09-27
Payer: COMMERCIAL

## 2024-09-27 ENCOUNTER — PRIOR AUTHORIZATION (OUTPATIENT)
Dept: PSYCHIATRY | Facility: CLINIC | Age: 44
End: 2024-09-27
Payer: COMMERCIAL

## 2024-09-27 ENCOUNTER — HOSPITAL ENCOUNTER (OUTPATIENT)
Facility: HOSPITAL | Age: 44
Setting detail: OBSERVATION
Discharge: HOME | End: 2024-09-28
Attending: EMERGENCY MEDICINE | Admitting: EMERGENCY MEDICINE
Payer: COMMERCIAL

## 2024-09-27 ENCOUNTER — APPOINTMENT (OUTPATIENT)
Dept: GENERAL RADIOLOGY | Facility: HOSPITAL | Age: 44
End: 2024-09-27
Payer: COMMERCIAL

## 2024-09-27 ENCOUNTER — PATIENT ROUNDING (BHMG ONLY) (OUTPATIENT)
Dept: BARIATRICS/WEIGHT MGMT | Facility: CLINIC | Age: 44
End: 2024-09-27
Payer: COMMERCIAL

## 2024-09-27 DIAGNOSIS — S89.92XA INJURY OF LEFT KNEE, INITIAL ENCOUNTER: Primary | ICD-10-CM

## 2024-09-27 PROBLEM — M25.562 LEFT KNEE PAIN: Status: ACTIVE | Noted: 2024-09-27

## 2024-09-27 LAB
ALBUMIN SERPL-MCNC: 4 G/DL (ref 3.5–5.2)
ALBUMIN/GLOB SERPL: 1.3 G/DL
ALP SERPL-CCNC: 65 U/L (ref 39–117)
ALT SERPL W P-5'-P-CCNC: 19 U/L (ref 1–33)
ANION GAP SERPL CALCULATED.3IONS-SCNC: 10.5 MMOL/L (ref 5–15)
AST SERPL-CCNC: 25 U/L (ref 1–32)
BASOPHILS # BLD AUTO: 0.03 10*3/MM3 (ref 0–0.2)
BASOPHILS NFR BLD AUTO: 0.4 % (ref 0–1.5)
BILIRUB SERPL-MCNC: 0.2 MG/DL (ref 0–1.2)
BUN SERPL-MCNC: 8 MG/DL (ref 6–20)
BUN/CREAT SERPL: 10.4 (ref 7–25)
CALCIUM SPEC-SCNC: 8.9 MG/DL (ref 8.6–10.5)
CHLORIDE SERPL-SCNC: 105 MMOL/L (ref 98–107)
CO2 SERPL-SCNC: 23.5 MMOL/L (ref 22–29)
CREAT SERPL-MCNC: 0.77 MG/DL (ref 0.57–1)
DEPRECATED RDW RBC AUTO: 46.8 FL (ref 37–54)
EGFRCR SERPLBLD CKD-EPI 2021: 98.9 ML/MIN/1.73
EOSINOPHIL # BLD AUTO: 0.07 10*3/MM3 (ref 0–0.4)
EOSINOPHIL NFR BLD AUTO: 0.9 % (ref 0.3–6.2)
ERYTHROCYTE [DISTWIDTH] IN BLOOD BY AUTOMATED COUNT: 12.9 % (ref 12.3–15.4)
GLOBULIN UR ELPH-MCNC: 3.1 GM/DL
GLUCOSE SERPL-MCNC: 99 MG/DL (ref 65–99)
HCT VFR BLD AUTO: 37.8 % (ref 34–46.6)
HGB BLD-MCNC: 13.1 G/DL (ref 12–15.9)
IMM GRANULOCYTES # BLD AUTO: 0.03 10*3/MM3 (ref 0–0.05)
IMM GRANULOCYTES NFR BLD AUTO: 0.4 % (ref 0–0.5)
LYMPHOCYTES # BLD AUTO: 2.7 10*3/MM3 (ref 0.7–3.1)
LYMPHOCYTES NFR BLD AUTO: 33.8 % (ref 19.6–45.3)
MCH RBC QN AUTO: 34.6 PG (ref 26.6–33)
MCHC RBC AUTO-ENTMCNC: 34.7 G/DL (ref 31.5–35.7)
MCV RBC AUTO: 99.7 FL (ref 79–97)
MONOCYTES # BLD AUTO: 0.54 10*3/MM3 (ref 0.1–0.9)
MONOCYTES NFR BLD AUTO: 6.8 % (ref 5–12)
NEUTROPHILS NFR BLD AUTO: 4.63 10*3/MM3 (ref 1.7–7)
NEUTROPHILS NFR BLD AUTO: 57.7 % (ref 42.7–76)
NRBC BLD AUTO-RTO: 0 /100 WBC (ref 0–0.2)
PLATELET # BLD AUTO: 294 10*3/MM3 (ref 140–450)
PMV BLD AUTO: 10.2 FL (ref 6–12)
POTASSIUM SERPL-SCNC: 4.1 MMOL/L (ref 3.5–5.2)
PROT SERPL-MCNC: 7.1 G/DL (ref 6–8.5)
RBC # BLD AUTO: 3.79 10*6/MM3 (ref 3.77–5.28)
SODIUM SERPL-SCNC: 139 MMOL/L (ref 136–145)
WBC NRBC COR # BLD AUTO: 8 10*3/MM3 (ref 3.4–10.8)

## 2024-09-27 PROCEDURE — 96375 TX/PRO/DX INJ NEW DRUG ADDON: CPT

## 2024-09-27 PROCEDURE — 99285 EMERGENCY DEPT VISIT HI MDM: CPT

## 2024-09-27 PROCEDURE — G0378 HOSPITAL OBSERVATION PER HR: HCPCS

## 2024-09-27 PROCEDURE — 25010000002 ONDANSETRON PER 1 MG: Performed by: PHYSICIAN ASSISTANT

## 2024-09-27 PROCEDURE — 80053 COMPREHEN METABOLIC PANEL: CPT | Performed by: PHYSICIAN ASSISTANT

## 2024-09-27 PROCEDURE — 96376 TX/PRO/DX INJ SAME DRUG ADON: CPT

## 2024-09-27 PROCEDURE — 25010000002 ONDANSETRON PER 1 MG: Performed by: NURSE PRACTITIONER

## 2024-09-27 PROCEDURE — 73721 MRI JNT OF LWR EXTRE W/O DYE: CPT

## 2024-09-27 PROCEDURE — 25010000002 KETOROLAC TROMETHAMINE PER 15 MG

## 2024-09-27 PROCEDURE — 25010000002 HYDROMORPHONE PER 4 MG: Performed by: EMERGENCY MEDICINE

## 2024-09-27 PROCEDURE — 73560 X-RAY EXAM OF KNEE 1 OR 2: CPT

## 2024-09-27 PROCEDURE — 85025 COMPLETE CBC W/AUTO DIFF WBC: CPT | Performed by: PHYSICIAN ASSISTANT

## 2024-09-27 PROCEDURE — 96374 THER/PROPH/DIAG INJ IV PUSH: CPT

## 2024-09-27 RX ORDER — ONDANSETRON 2 MG/ML
4 INJECTION INTRAMUSCULAR; INTRAVENOUS EVERY 6 HOURS PRN
Status: DISCONTINUED | OUTPATIENT
Start: 2024-09-27 | End: 2024-09-28 | Stop reason: HOSPADM

## 2024-09-27 RX ORDER — GABAPENTIN 100 MG/1
100 CAPSULE ORAL EVERY 8 HOURS SCHEDULED
Status: DISCONTINUED | OUTPATIENT
Start: 2024-09-27 | End: 2024-09-28 | Stop reason: HOSPADM

## 2024-09-27 RX ORDER — SODIUM CHLORIDE 0.9 % (FLUSH) 0.9 %
10 SYRINGE (ML) INJECTION EVERY 12 HOURS SCHEDULED
Status: DISCONTINUED | OUTPATIENT
Start: 2024-09-27 | End: 2024-09-28 | Stop reason: HOSPADM

## 2024-09-27 RX ORDER — HYDROCODONE BITARTRATE AND ACETAMINOPHEN 10; 325 MG/1; MG/1
1 TABLET ORAL ONCE
Status: COMPLETED | OUTPATIENT
Start: 2024-09-27 | End: 2024-09-27

## 2024-09-27 RX ORDER — METOPROLOL SUCCINATE 50 MG/1
50 TABLET, EXTENDED RELEASE ORAL DAILY
Status: DISCONTINUED | OUTPATIENT
Start: 2024-09-28 | End: 2024-09-28 | Stop reason: HOSPADM

## 2024-09-27 RX ORDER — ONDANSETRON 2 MG/ML
4 INJECTION INTRAMUSCULAR; INTRAVENOUS ONCE
Status: COMPLETED | OUTPATIENT
Start: 2024-09-27 | End: 2024-09-27

## 2024-09-27 RX ORDER — HYDROCODONE BITARTRATE AND ACETAMINOPHEN 5; 325 MG/1; MG/1
1 TABLET ORAL EVERY 6 HOURS PRN
Qty: 12 TABLET | Refills: 0 | Status: SHIPPED | OUTPATIENT
Start: 2024-09-27

## 2024-09-27 RX ORDER — METHOCARBAMOL 750 MG/1
750 TABLET, FILM COATED ORAL 3 TIMES DAILY
Status: DISCONTINUED | OUTPATIENT
Start: 2024-09-27 | End: 2024-09-28 | Stop reason: HOSPADM

## 2024-09-27 RX ORDER — SODIUM CHLORIDE 0.9 % (FLUSH) 0.9 %
10 SYRINGE (ML) INJECTION AS NEEDED
Status: DISCONTINUED | OUTPATIENT
Start: 2024-09-27 | End: 2024-09-28 | Stop reason: HOSPADM

## 2024-09-27 RX ORDER — SODIUM CHLORIDE 9 MG/ML
40 INJECTION, SOLUTION INTRAVENOUS AS NEEDED
Status: DISCONTINUED | OUTPATIENT
Start: 2024-09-27 | End: 2024-09-28 | Stop reason: HOSPADM

## 2024-09-27 RX ORDER — HYDROMORPHONE HYDROCHLORIDE 1 MG/ML
0.5 INJECTION, SOLUTION INTRAMUSCULAR; INTRAVENOUS; SUBCUTANEOUS ONCE
Status: COMPLETED | OUTPATIENT
Start: 2024-09-27 | End: 2024-09-27

## 2024-09-27 RX ORDER — KETOROLAC TROMETHAMINE 30 MG/ML
30 INJECTION, SOLUTION INTRAMUSCULAR; INTRAVENOUS ONCE
Status: COMPLETED | OUTPATIENT
Start: 2024-09-27 | End: 2024-09-27

## 2024-09-27 RX ADMIN — GABAPENTIN 100 MG: 100 CAPSULE ORAL at 17:19

## 2024-09-27 RX ADMIN — HYDROCODONE BITARTRATE AND ACETAMINOPHEN 1 TABLET: 10; 325 TABLET ORAL at 13:25

## 2024-09-27 RX ADMIN — HYDROMORPHONE HYDROCHLORIDE 0.5 MG: 1 INJECTION, SOLUTION INTRAMUSCULAR; INTRAVENOUS; SUBCUTANEOUS at 08:59

## 2024-09-27 RX ADMIN — HYDROCODONE BITARTRATE AND ACETAMINOPHEN 1 TABLET: 10; 325 TABLET ORAL at 07:51

## 2024-09-27 RX ADMIN — Medication 10 MG: at 22:23

## 2024-09-27 RX ADMIN — Medication 10 ML: at 09:01

## 2024-09-27 RX ADMIN — ONDANSETRON 4 MG: 2 INJECTION, SOLUTION INTRAMUSCULAR; INTRAVENOUS at 08:59

## 2024-09-27 RX ADMIN — METHOCARBAMOL TABLETS 750 MG: 750 TABLET, COATED ORAL at 16:12

## 2024-09-27 RX ADMIN — KETOROLAC TROMETHAMINE 30 MG: 30 INJECTION, SOLUTION INTRAMUSCULAR at 22:22

## 2024-09-27 RX ADMIN — Medication 10 ML: at 20:01

## 2024-09-27 RX ADMIN — ONDANSETRON 4 MG: 2 INJECTION, SOLUTION INTRAMUSCULAR; INTRAVENOUS at 18:31

## 2024-09-27 RX ADMIN — HYDROMORPHONE HYDROCHLORIDE 0.5 MG: 1 INJECTION, SOLUTION INTRAMUSCULAR; INTRAVENOUS; SUBCUTANEOUS at 10:32

## 2024-09-27 RX ADMIN — METHOCARBAMOL TABLETS 750 MG: 750 TABLET, COATED ORAL at 21:53

## 2024-09-27 NOTE — H&P
[facility]   HISTORY AND PHYSICAL    Patient Name: Brittany Sheppard  : 1980  MRN: [id number]  Primary Care Physician:  Jane Buenrostro APRN  Date of admission: 2024    Subjective   Subjective     Chief Complaint: Left knee pain    HPI:    Brittany Sheppard is a 44 y.o. female with PMH of COPD, bipolar, IBS, history of hepatitis C, and sleep apnea presents to [facility] with left knee pain.  Patient reports that she injured her left knee a month ago while she was trying to keep patient from falling.  States that she was evaluated by a and knee immobilizer, crutches and boot was recommended however states that it makes her knee pain worse therefore has not worn it.  This morning patient reports that she slipped and fell down the stairs landing on her left knee which caused her a great deal of pain.  Patient denies being anticoagulated.  Denies striking her head.  Patient reports that she is unable to bear any weight secondary to severe pain.  Denies history of surgeries on left knee.        Laboratory evaluation notably unremarkable.  Plain films of left knee shows mild knee effusion otherwise no evidence of acute fracture or erosion.    Review of Systems   All systems were reviewed and negative except for: That mentioned above in HPI    Personal History     Past Medical History:   Diagnosis Date    Acute recurrent frontal sinusitis 2022    Anemia     Anxiety     Arthritis     Asthma     Back pain     Bipolar 1 disorder     COPD (chronic obstructive pulmonary disease)     Depression     Dyspnea     Elevated liver enzymes     GERD (gastroesophageal reflux disease)     H. pylori infection     treated 2016    Hepatitis C 2015    harvoni completed; attributed to tatoo    Hirsutism     History of migraine headaches     with scotomata    HPV in female 2021    HPV in female 2014    HPV in female 10/2015    Hypertension     IBS (irritable bowel syndrome)     Insomnia     LGSIL  on Pap smear of cervix 01/2016    cx biopsy confoirmed LGSIL    Morbid obesity with body mass index of 50.0-59.9 in adult 06/24/2014    Obesity     PCOS (polycystic ovarian syndrome)     PCR positive for herpes simplex virus type 1 (HSV-1) DNA 2018    Preop cardiovascular exam 10/10/2014    Sleep apnea     Steatosis of liver     liver biopsy; moderate; stage 3, grade 0    Vitamin D deficiency        Past Surgical History:   Procedure Laterality Date    APPENDECTOMY  07/19/2010    [facility]    BREAST SURGERY      I & D of abcess X3    CYST REMOVAL  09/2008    D & C HYSTEROSCOPY ENDOMETRIAL ABLATION  12/16/2010    menorrhagia; pathology benign    LAPAROSCOPIC GASTRIC BANDING  03/27/2017    Dr. Prieto Colon [facility]    LIVER BIOPSY      POLYPECTOMY      TOOTH EXTRACTION  2016    WISDOM TOOTH EXTRACTION  2012       Family History: family history includes Alcohol abuse in her father; Bipolar disorder in her father and nephew; Breast cancer in her paternal grandmother; Breast cancer (age of onset: 72) in her maternal grandmother; Cancer in her maternal grandfather; Diabetes in her father, maternal grandmother, and mother; Hypertension in her father, maternal grandmother, mother, and paternal grandmother; Migraines in her father; Obesity in her mother; Pancreatic cancer in her paternal grandmother; Schizophrenia in her nephew; Sleep apnea in her mother; Stroke in her maternal grandfather, maternal grandmother, and paternal grandmother. Otherwise pertinent FHx was reviewed and not pertinent to current issue.    Social History:  reports that she quit smoking about 20 months ago. Her smoking use included cigarettes. She has never been exposed to tobacco smoke. She has never used smokeless tobacco. She reports current alcohol use. She reports that she does not currently use drugs after having used the following drugs: Marijuana.    Home Medications:  HYDROcodone-acetaminophen, Lumateperone Tosylate, Melatonin, albuterol sulfate HFA,  calcium carbonate, diclofenac, gabapentin, hydrOXYzine pamoate, methocarbamol, metoprolol succinate XL, sertraline, and valACYclovir    Allergies:  Allergies   Allergen Reactions    Cephalexin Hives    Penicillins Anaphylaxis and Hives    Prunus Persica Anaphylaxis    Adhesive Tape Other (See Comments)     Pulls skin off    Cefaclor Hives    Codeine Hives    Morphine Hives and Itching    Latex Hives and Rash     Hives and anaphlaxic         Objective   Objective     Vitals:   Temp:  [98 °F (36.7 °C)] 98 °F (36.7 °C)  Heart Rate:  [] 88  Resp:  [18] 18  BP: (142)/(97) 142/97  Flow (L/min):  [2] 2  Physical Exam    Constitutional: Awake, alert   Eyes: PERRLA, sclerae anicteric, no conjunctival injection   HENT: NCAT, mucous membranes moist   Neck: Supple, no thyromegaly, no lymphadenopathy, trachea midline   Respiratory: Clear to auscultation bilaterally, nonlabored respirations    Cardiovascular: RRR, no murmurs, rubs, or gallops, palpable pedal pulses bilaterally   Gastrointestinal: Positive bowel sounds, soft, nontender, nondistended   Musculoskeletal: No bilateral ankle edema, no clubbing or cyanosis to extremities, the lower aspect of left knee, diffuse tenderness with mild palpitation.  Limited range of motion secondary to pain   Psychiatric: Appropriate affect, cooperative   Neurologic: Oriented x 3, strength symmetric in all extremities, Cranial Nerves grossly intact to confrontation, speech clear   Skin: No rashes     Result Review    Result Review:  I have personally reviewed the results from the time of this admission to 9/27/2024 09:46 EDT and agree with these findings:  [x]  Laboratory list / accordion  []  Microbiology  [x]  Radiology  []  EKG/Telemetry   []  Cardiology/Vascular   []  Pathology  []  Old records  []  Other:        Assessment & Plan   Assessment / Plan     Brief Patient Summary:  Brittany Sheppard is a 44 y.o. female who has been admitted to observation secondary to left knee  pain    Active Hospital Problems:  Active Hospital Problems    Diagnosis     **Left knee pain      Plan:   Left knee pain  -MRI left knee shows large joint effusion with diffuse sign of a thickening, but partial-thickness curvilinear radial tear through the medial meniscus posterior horn and her free margin.  Nonaggressive appearing bone lesion in the posterior intercondylar distal femur most likely benign.  -Discussed MRI findings with Ortho and patient to follow-up in the office on Monday for joint aspiration  -Analgesic as needed  -PT and OT to evaluate      Bipolar   -Continue home regimen    VTE Prophylaxis:  Mechanical VTE prophylaxis orders are present.      CODE STATUS:    Level Of Support Discussed With: Patient  Code Status (Patient has no pulse and is not breathing): CPR (Attempt to Resuscitate)  Medical Interventions (Patient has pulse or is breathing): Full Support    Admission Status:  I believe this patient meets observation status.    During patient visit, I utilized appropriate personal protective equipment including gloves. Appropriate PPE was worn during the entire visit.  Hand hygiene was completed before and after    60 minutes has been spent by [facility] providers in the care of this patient while under observation status    Electronically signed by CHRIS Ludiwg, 09/27/24, 9:46 AM EDT.

## 2024-09-27 NOTE — PLAN OF CARE
Goal Outcome Evaluation: pt admitted for left knee aching, weakness, and edema. Pt states to have fallen down the last three stairs of her house, when leaving for work this morning. See mar for pain interventions. MRI completed. Pt is A/O x4, is agreeable to the plan of care and verbalizes understanding.       Problem: Adult Inpatient Plan of Care  Goal: Plan of Care Review  Outcome: Progressing  Goal: Patient-Specific Goal (Individualized)  Outcome: Progressing  Goal: Absence of Hospital-Acquired Illness or Injury  Outcome: Progressing  Intervention: Identify and Manage Fall Risk  Recent Flowsheet Documentation  Taken 9/27/2024 1816 by Maya Cam RN  Safety Promotion/Fall Prevention:   room organization consistent   safety round/check completed  Taken 9/27/2024 1325 by Maya Cam RN  Safety Promotion/Fall Prevention:   room organization consistent   safety round/check completed  Intervention: Prevent Skin Injury  Recent Flowsheet Documentation  Taken 9/27/2024 1816 by Maya Cam RN  Body Position: position changed independently  Taken 9/27/2024 1325 by Maya Cam RN  Body Position: position changed independently  Intervention: Prevent and Manage VTE (Venous Thromboembolism) Risk  Recent Flowsheet Documentation  Taken 9/27/2024 1325 by Maya Cam RN  Activity Management: activity encouraged  VTE Prevention/Management: sequential compression devices off  Range of Motion: active ROM (range of motion) encouraged  Intervention: Prevent Infection  Recent Flowsheet Documentation  Taken 9/27/2024 1816 by Maya Cam RN  Infection Prevention: single patient room provided  Taken 9/27/2024 1325 by Maya Cam RN  Infection Prevention: single patient room provided  Goal: Optimal Comfort and Wellbeing  Outcome: Progressing  Intervention: Monitor Pain and Promote Comfort  Recent Flowsheet Documentation  Taken 9/27/2024 1325 by Maya Cam RN  Pain Management Interventions:    cold applied   see MAR  Intervention: Provide Person-Centered Care  Recent Flowsheet Documentation  Taken 9/27/2024 1325 by Maya Cam, RN  Trust Relationship/Rapport:   care explained   choices provided  Goal: Readiness for Transition of Care  Outcome: Progressing     Problem: Pain Acute  Goal: Acceptable Pain Control and Functional Ability  Outcome: Progressing  Intervention: Develop Pain Management Plan  Recent Flowsheet Documentation  Taken 9/27/2024 1325 by Maya Cam, RN  Pain Management Interventions:   cold applied   see MAR     Problem: Skin Injury Risk Increased  Goal: Skin Health and Integrity  Outcome: Progressing  Intervention: Optimize Skin Protection  Recent Flowsheet Documentation  Taken 9/27/2024 1816 by Maya Cam, RN  Head of Bed (HOB) Positioning: HOB elevated  Taken 9/27/2024 1325 by Maya Cam, RN  Head of Bed (HOB) Positioning: HOB elevated

## 2024-09-27 NOTE — LETTER
September 28, 2024     Patient: Brittany Sheppard   YOB: 1980   Date of Visit: 9/27/2024       To Whom It May Concern:    It is my medical opinion that Brittany Sheppard may return to work on Tuesday 10/1/2024 .           Sincerely,        CHRIS Arvizu

## 2024-09-27 NOTE — ED NOTES
"Nursing report ED to floor  Brittany Sheppard  44 y.o.  female    HPI :  HPI (Adult)  Stated Reason for Visit: L knee injury  History Obtained From: patient    Chief Complaint  Chief Complaint   Patient presents with    Knee Injury       Admitting doctor:   Jamison Dc MD    Admitting diagnosis:   The encounter diagnosis was Injury of left knee, initial encounter.    Code status:   Current Code Status       Date Active Code Status Order ID Comments User Context       9/27/2024 0855 CPR (Attempt to Resuscitate) [id number]  Ubaldo Burkett APRN ED        Question Answer    Code Status (Patient has no pulse and is not breathing) CPR (Attempt to Resuscitate)    Medical Interventions (Patient has pulse or is breathing) Full Support    Level Of Support Discussed With Patient                    Allergies:   Cephalexin, Penicillins, Prunus persica, Adhesive tape, Cefaclor, Codeine, Morphine, and Latex    Isolation:   No active isolations    Intake and Output  No intake or output data in the 24 hours ending 09/27/24 0907    Weight:       09/27/24  0734   Weight: 122 kg (270 lb)       Most recent vitals:   Vitals:    09/27/24 0734 09/27/24 0737 09/27/24 0739 09/27/24 0740   BP:  142/97 142/97    Pulse:    100   Resp:       Temp:       TempSrc:       SpO2:    98%   Weight: 122 kg (270 lb)      Height: 165.1 cm (65\")          Active LDAs/IV Access:   Lines, Drains & Airways       Active LDAs       Name Placement date Placement time Site Days    Peripheral IV 09/27/24 0855 Right Antecubital 09/27/24  0855  Antecubital  less than 1                    Labs (abnormal labs have a star):   Labs Reviewed   COMPREHENSIVE METABOLIC PANEL   CBC WITH AUTO DIFFERENTIAL   CBC AND DIFFERENTIAL    Narrative:     The following orders were created for panel order CBC & Differential.  Procedure                               Abnormality         Status                     ---------                               -----------         ------       "               CBC Auto Differential[[id number]]                                                         Please view results for these tests on the individual orders.       EKG:   No orders to display       Meds given in ED:   Medications   sodium chloride 0.9 % flush 10 mL (has no administration in time range)   sodium chloride 0.9 % flush 10 mL (10 mL Intravenous Given 24 0901)   sodium chloride 0.9 % flush 10 mL (has no administration in time range)   sodium chloride 0.9 % infusion 40 mL (has no administration in time range)   HYDROcodone-acetaminophen (NORCO)  MG per tablet 1 tablet (1 tablet Oral Given 24 0751)   ondansetron (ZOFRAN) injection 4 mg (4 mg Intravenous Given 24 0859)   HYDROmorphone (DILAUDID) injection 0.5 mg (0.5 mg Intravenous Given 24 0859)       Imaging results:  XR Knee 1 or 2 View Left    Result Date: 2024   As described.    This report was finalized on 2024 8:17 AM by Dr. Linden Yang M.D on Workstation: [facility]       Ambulatory status:   - bed rest    Social issues:   Social History     Socioeconomic History    Marital status:     Number of children: 0   Tobacco Use    Smoking status: Former     Current packs/day: 0.00     Types: Cigarettes     Quit date:      Years since quittin.7     Passive exposure: Never    Smokeless tobacco: Never   Vaping Use    Vaping status: Never Used   Substance and Sexual Activity    Alcohol use: Yes     Comment: off and on- won't drink for weeks and then will have a week where she drinks several days    Drug use: Not Currently     Types: Marijuana    Sexual activity: Defer     Partners: Male, Female       Peripheral Neurovascular  Peripheral Neurovascular (Adult)  Peripheral Neurovascular WDL: WDL  LLE Neurovascular Assessment  Temperature LLE: warm  Sensation LLE: tenderness present, no numbness, no tingling    Neuro Cognitive  Neuro Cognitive (Adult)  Cognitive/Neuro/Behavioral WDL:  WDL    Learning  Learning Assessment (Adult)  Learning Readiness and Ability: no barriers identified    Respiratory  Respiratory WDL  Respiratory WDL: WDL    Abdominal Pain       Pain Assessments  Pain (Adult)  (0-10) Pain Rating: Rest: 8  (0-10) Pain Rating: Activity: 8    NIH Stroke Scale       Liz Sequeira RN  09/27/24 09:07 EDT

## 2024-09-27 NOTE — PROGRESS NOTES
ETHEL FRANK Attestation Note     I supervised care provided by the midlevel provider. We have discussed this patient's history, physical exam, and treatment plan. I have reviewed the midlevel provider's note and I agree with the midlevel provider's findings and plan of care.   SHARED VISIT: This visit was performed by BOTH a physician and an APC. The substantive portion of the medical decision making was performed by this attesting physician who made or approved the management plan and takes responsibility for patient management. All studies in the APC note (if performed) were independently interpreted by me.   I have personally had a face to face encounter with the patient.   My personal findings are documented below:      Subjective  Pt is a 44 y.o. female admitted from Emergency Department for evaluation and treatment of left knee pain.  Patient injured the knee about a month ago and has been seen by orthopedics.  She slipped today and landed on her left knee with increased pain.  She was unable to get up and walk despite knee immobilizer in the ED.  Denies other injury or back pain.  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    Physical Exam  GENERAL: Alert and in NAD, Vitals reviewed-blood pressure 142/97, pulse 88.  Temperature and O2 sats benign  HENT: nares patent  EYES: no scleral icterus  CV: regular rhythm, regular rate-no murmur  RESPIRATORY: normal effort, clear to auscultation bilaterally  ABDOMEN: soft, morbid obesity  MUSCULOSKELETAL: Upper extremities-atraumatic  Lower extremities-examination of the left knee reveals mild to moderate effusion, there is diffuse tenderness to palpation without noted bony deformity.  Range of motion limited secondary to pain.  Distal strength sensation and pulses are grossly intact.  NEURO: Strength sensation and coordination are grossly intact.  Speech and mentation are unremarkable  SKIN: warm, dry    Assessment/Plan  I discussed tx and evaluation of  this patient with CHRIS Burkett.  I did independently interpret images of the left knee and I see no obvious fracture.  Small effusion is noted.  Plan admission to the observation unit with MRI of knee, orthopedic and PT consultation.

## 2024-09-27 NOTE — PROGRESS NOTES
Clinical Pharmacy Services: Medication History    Brittany Sheppard is a 44 y.o. female presenting to [facility] for   Chief Complaint   Patient presents with    Knee Injury       She  has a past medical history of Acute recurrent frontal sinusitis (09/01/2022), Anemia, Anxiety, Arthritis, Asthma, Back pain, Bipolar 1 disorder, COPD (chronic obstructive pulmonary disease), Depression, Dyspnea, Elevated liver enzymes, GERD (gastroesophageal reflux disease), H. 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    Allergies as of 09/27/2024 - Reviewed 09/27/2024   Allergen Reaction Noted    Cephalexin Hives 05/02/2014    Penicillins Anaphylaxis and Hives 08/27/2013    Prunus persica Anaphylaxis 09/07/2017    Adhesive tape Other (See Comments) 12/13/2023    Cefaclor Hives 05/02/2014    Codeine Hives 10/13/2015    Morphine Hives and Itching 10/13/2015    Latex Hives and Rash 03/17/2015       Medication information was obtained from: Patient   Pharmacy and Phone Number:     Prior to Admission Medications       Prescriptions Last Dose Informant Patient Reported? Taking?    albuterol sulfate  (90 Base) MCG/ACT inhaler  Self No Yes    Inhale 2 puffs Every 4 (Four) Hours As Needed for Wheezing or Shortness of Air.    calcium carbonate (TUMS) 500 MG chewable tablet   Yes Yes    Chew 1 tablet As Needed for Indigestion or Heartburn.    diclofenac (VOLTAREN) 50 MG EC tablet 9/26/2024 Self No Yes    Take 1 tablet by mouth 3 (Three)  Times a Day.    gabapentin (Neurontin) 100 MG capsule 9/26/2024 Pharmacy, Self No Yes    Take 1 capsule by mouth 3 (Three) Times a Day.    hydrOXYzine pamoate (Vistaril) 50 MG capsule  Self No Yes    Take 1-2 capsules by mouth 3 (Three) Times a Day As Needed for Sleep/Anxiety    Lumateperone Tosylate 10.5 MG capsule 9/26/2024 Self No Yes    Take 1 capsule by mouth Every Night.    Melatonin 10 MG tablet 9/26/2024 Self Yes Yes    Take 1 tablet by mouth Every Night.    methocarbamol (ROBAXIN) 750 MG tablet 9/26/2024 Self No Yes    Take 1 tablet by mouth 3 (Three) Times a Day.    metoprolol succinate XL (TOPROL-XL) 50 MG 24 hr tablet 9/27/2024 Pharmacy, Self No Yes    Take 1 tablet by mouth Daily.    sertraline (Zoloft) 100 MG tablet 9/26/2024 Pharmacy No Yes    Take 2 tablets by mouth Daily.    valACYclovir (Valtrex) 500 MG tablet 9/26/2024 Pharmacy, Self No Yes    Take 1 tablet by mouth 2 (Two) Times a Day.              Medication notes:     This medication list is complete to the best of my knowledge as of 9/27/2024    Please call if questions.    Amaury Christine  Medication History Technician  [phone]    9/27/2024 09:06 EDT

## 2024-09-27 NOTE — ED PROVIDER NOTES
MD ATTESTATION NOTE    The RAISA and I have discussed this patient's history, physical exam, MDM, and treatment plan.  I have reviewed the documentation and personally had a face to face interaction with the patient. I affirm the documentation and agree with the treatment and plan.  The attached note describes my personal findings.      I provided a substantive portion of the medical decision making.        Brief HPI: Patient presents with complaint of acute left knee pain.  She has been dealing with pain in her left knee and left calf for a couple of weeks now and has been seen by orthopedic surgery.  She has an MRI of her knee scheduled for October 11.  Today she fell down some steps because she felt like her knee buckled.  She denies head trauma or loss of consciousness.    PHYSICAL EXAM  ED Triage Vitals   Temp Heart Rate Resp BP SpO2   09/27/24 0732 09/27/24 0732 09/27/24 0732 09/27/24 0737 09/27/24 0732   98 °F (36.7 °C) 103 18 142/97 100 %      Temp src Heart Rate Source Patient Position BP Location FiO2 (%)   09/27/24 0732 09/27/24 0732 -- -- --   Tympanic Monitor            GENERAL: Awake and alert, no acute distress  HENT: nares patent  EYES: no scleral icterus  CV: regular rhythm, normal rate  RESPIRATORY: normal effort  MUSCULOSKELETAL: no deformity, mild swelling of the left knee, no patellar crepitus.  Mild point tenderness over the patellar tendon however knee extensor mechanism is intact.  2+ left DP and PT pulses.  There is mild left calf tenderness without appreciable swelling of the left lower leg.  There is pain with varus and valgus stress on the left knee without significant instability.  Lachman's deferred due to patient's discomfort.  NEURO: alert, moves all extremities, follows commands, normal sensation to light touch throughout left lower extremity, normal strength with plantarflexion and dorsiflexion left foot  PSYCH:  calm, cooperative  SKIN: warm, dry    Vital signs and nursing notes  reviewed.        Medical Decision Making:  ED Course as of 09/27/24 0833   Fri Sep 27, 2024   0816 X-ray of left knee independently interpreted by me as no fracture [MP]   0822 No acute fracture noted on x-ray.  Will place patient in knee immobilizer and have her continue to follow-up with Dr. Iglesias [MP]   0832 I reviewed orthopedics progress note from 9/18/2024.  Patient was seen for left knee and calf pain. [JR]      ED Course User Index  [JR] Ori Maria MD  [MP] Jen Rainey PA-C       Patient with acute on subacute left knee pain after a fall today.  She has no acute fracture on x-ray.  She has a knee immobilizer at home but apparently was not wearing it today when this injury occurred.  We will place a knee immobilizer and have her try to ambulate with crutches.  I do not think that an emergent MRI would  as I do suspect a ligamentous or meniscus injury.  I have reviewed her [place] report and I have sent a short course of Norco as needed for pain to her pharmacy.         Ori Maria MD  09/27/24 0802

## 2024-09-28 ENCOUNTER — READMISSION MANAGEMENT (OUTPATIENT)
Dept: CALL CENTER | Facility: HOSPITAL | Age: 44
End: 2024-09-28
Payer: COMMERCIAL

## 2024-09-28 VITALS
RESPIRATION RATE: 18 BRPM | WEIGHT: 270 LBS | BODY MASS INDEX: 44.98 KG/M2 | HEIGHT: 65 IN | SYSTOLIC BLOOD PRESSURE: 164 MMHG | DIASTOLIC BLOOD PRESSURE: 97 MMHG | OXYGEN SATURATION: 100 % | TEMPERATURE: 97.5 F | HEART RATE: 78 BPM

## 2024-09-28 PROCEDURE — 97161 PT EVAL LOW COMPLEX 20 MIN: CPT

## 2024-09-28 PROCEDURE — 97165 OT EVAL LOW COMPLEX 30 MIN: CPT

## 2024-09-28 PROCEDURE — G0378 HOSPITAL OBSERVATION PER HR: HCPCS

## 2024-09-28 RX ORDER — HYDROCODONE BITARTRATE AND ACETAMINOPHEN 5; 325 MG/1; MG/1
1 TABLET ORAL EVERY 6 HOURS PRN
Status: DISCONTINUED | OUTPATIENT
Start: 2024-09-28 | End: 2024-09-28 | Stop reason: HOSPADM

## 2024-09-28 RX ADMIN — METOPROLOL SUCCINATE 50 MG: 50 TABLET, FILM COATED, EXTENDED RELEASE ORAL at 08:49

## 2024-09-28 RX ADMIN — SERTRALINE 200 MG: 50 TABLET, FILM COATED ORAL at 08:49

## 2024-09-28 RX ADMIN — GABAPENTIN 100 MG: 100 CAPSULE ORAL at 00:08

## 2024-09-28 RX ADMIN — HYDROCODONE BITARTRATE AND ACETAMINOPHEN 1 TABLET: 5; 325 TABLET ORAL at 08:50

## 2024-09-28 RX ADMIN — METHOCARBAMOL TABLETS 750 MG: 750 TABLET, COATED ORAL at 08:49

## 2024-09-28 RX ADMIN — Medication 10 ML: at 08:51

## 2024-09-28 RX ADMIN — GABAPENTIN 100 MG: 100 CAPSULE ORAL at 08:49

## 2024-09-28 NOTE — THERAPY DISCHARGE NOTE
Acute Care - Occupational Therapy Discharge  [facility]    Patient Name: Brittany Sheppard  : 1980    MRN: [id number]                              Today's Date: 2024       Admit Date: 2024    Visit Dx:     ICD-10-CM ICD-9-CM   1. Injury of left knee, initial encounter  S89.92XA 959.7     Patient Active Problem List   Diagnosis    Abscess of axilla, left    Lumbago    Acute hepatitis C without hepatic coma    Generalized anxiety disorder    Asthma    Atopic contact dermatitis    Benign hypertension    Breast abscess of female    Cervical high risk HPV (human papillomavirus) test positive    Chondromalacia of right patella    Chronic pain of right knee    Gastroesophageal reflux disease    Hamstring tightness of right lower extremity    Gastric banding status    History of smoking    HSV-2 seropositive    Intractable chronic migraine without aura and without status migrainosus    LGSIL (low grade squamous intraepithelial dysplasia)    Patellofemoral syndrome of both knees    Pes anserinus bursitis of right knee    Primary osteoarthritis of both knees    Pruritic rash    Sepsis    Vitamin D deficiency    Osteoarthritis of multiple joints    Acute cystitis with hematuria    Generalized abdominal pain    KENDRA on CPAP    Bipolar 1 disorder, depressed, moderate    Alcohol use disorder, moderate, in early remission    Obesity, Class III, BMI 40-49.9 (morbid obesity)    Dietary counseling    Pre-op evaluation    Fatty liver    PCOS (polycystic ovarian syndrome)    Fibromyalgia    Left knee pain     Past Medical History:   Diagnosis Date    Acute recurrent frontal sinusitis 2022    Anemia     Anxiety     Arthritis     Asthma     Back pain     Bipolar 1 disorder     COPD (chronic obstructive pulmonary disease)     Depression     Dyspnea     Elevated liver enzymes     GERD (gastroesophageal reflux disease)     H. pylori infection     treated 2016    Hepatitis C 2015    harvoni completed; attributed  to tatoo    Hirsutism     History of migraine headaches     with scotomata    HPV in female 04/23/2021    HPV in female 03/2014    HPV in female 10/2015    Hypertension     IBS (irritable bowel syndrome)     Insomnia     LGSIL on Pap smear of cervix 01/2016    cx biopsy confoirmed LGSIL    Morbid obesity with body mass index of 50.0-59.9 in adult 06/24/2014    Obesity     PCOS (polycystic ovarian syndrome)     PCR positive for herpes simplex virus type 1 (HSV-1) DNA 2018    Preop cardiovascular exam 10/10/2014    Sleep apnea     Steatosis of liver     liver biopsy; moderate; stage 3, grade 0    Vitamin D deficiency      Past Surgical History:   Procedure Laterality Date    APPENDECTOMY  07/19/2010    [facility]    BREAST SURGERY      I & D of abcess X3    CYST REMOVAL  09/2008    D & C HYSTEROSCOPY ENDOMETRIAL ABLATION  12/16/2010    menorrhagia; pathology benign    LAPAROSCOPIC GASTRIC BANDING  03/27/2017    Dr. Prieto Colon [facility]    LIVER BIOPSY      POLYPECTOMY      TOOTH EXTRACTION  2016    WISDOM TOOTH EXTRACTION  2012      General Information       Row Name 09/28/24 1142 09/28/24 1047       OT Time and Intention    Document Type discharge evaluation/summary  -KR evaluation  -KR    Mode of Treatment occupational therapy  -KR occupational therapy  -KR      Row Name 09/28/24 1142 09/28/24 1047       General Information    Patient Profile Reviewed yes  -KR yes  -KR    Prior Level of Function independent:  -KR independent:  -KR    Barriers to Rehab none identified  -KR --      Row Name 09/28/24 1142          Living Environment    People in Home spouse  -KR       Row Name 09/28/24 1142          Cognition    Orientation Status (Cognition) oriented x 4  -KR               User Key  (r) = Recorded By, (t) = Taken By, (c) = Cosigned By      Initials Name Provider Type    KR Ann Marie Zelaya, OT Occupational Therapist                   Mobility/ADL's       Row Name 09/28/24 1246          Bed Mobility    Supine-Sit [place]  (Bed Mobility) independent  -KR     Sit-Supine [place] (Bed Mobility) independent  -KR       Row Name 09/28/24 1246          Sit-Stand Transfer    Sit-Stand [place] (Transfers) independent  -KR       Row Name 09/28/24 1246          Functional Mobility    Functional Mobility- Ind. Level independent  -KR       Row Name 09/28/24 1246          Activities of Daily Living    BADL Assessment/Intervention --  Discussed LBD techniques  -KR               User Key  (r) = Recorded By, (t) = Taken By, (c) = Cosigned By      Initials Name Provider Type    Ann Marie Dick OT Occupational Therapist                   Obj/Interventions       Row Name 09/28/24 1248          Range of Motion Comprehensive    General Range of Motion bilateral upper extremity ROM WFL  -KR       Row Name 09/28/24 1248          Balance    Comment, Balance no LOB with mobility  -KR               User Key  (r) = Recorded By, (t) = Taken By, (c) = Cosigned By      Initials Name Provider Type    Ann Marie Dick OT Occupational Therapist                   Goals/Plan       Row Name 09/28/24 1249          Transfer Goal 1 (OT)    Activity/Assistive Device (Transfer Goal 1, OT) transfers, all  -KR     [place] Level/Cues Needed (Transfer Goal 1, OT) independent  -KR     Progress/Outcome (Transfer Goal 1, OT) goal met  -KR               User Key  (r) = Recorded By, (t) = Taken By, (c) = Cosigned By      Initials Name Provider Type    Ann Marie Dick OT Occupational Therapist                   Clinical Impression       Row Name 09/28/24 1248          Pain Assessment    Pretreatment Pain Rating 6/10  -KR     Posttreatment Pain Rating 6/10  -KR     Pain Location - Side/Orientation Left  -KR     Pain Location - knee  -KR     Pain Intervention(s) Repositioned  -KR       Row Name 09/28/24 1248          Plan of Care Review    Plan of Care Reviewed With patient  -KR     Outcome Evaluation Pt seen for OT kylah this AM. Admitted with L knee pain found to  have a large joint effusion and medal meniscus tear. She was able to perform all mobility with no assist from therapist this morning. Discussed LBD dressing strategy and pt demonstrates understanding. Pt to follow up with orthopedic office on Monday per chart. Encouraged pt to continue mobilizing to and from the bathroom while in acute. No OT needs at this time.  -KR       Row Name 09/28/24 1248          Therapy Assessment/Plan (OT)    Therapy Frequency (OT) evaluation only  -KR       Row Name 09/28/24 1248          Therapy Plan Review/Discharge Plan (OT)    Anticipated Discharge Disposition (OT) home;home with assist  -KR       Row Name 09/28/24 1248          Vital Signs    Pre Patient Position Supine  -KR     Intra Patient Position Standing  -KR     Post Patient Position Supine  -KR       Row Name 09/28/24 1248          Positioning and Restraints    Pre-Treatment Position in bed  -KR     Post Treatment Position bed  -KR     In Bed notified nsg;supine;call light within reach;encouraged to call for assist  -KR               User Key  (r) = Recorded By, (t) = Taken By, (c) = Cosigned By      Initials Name Provider Type    Ann Marie Dick, OT Occupational Therapist                   Outcome Measures       Row Name 09/28/24 1250          How much help from another is currently needed...    Putting on and taking off regular lower body clothing? 3  -KR     Bathing (including washing, rinsing, and drying) 4  -KR     Toileting (which includes using toilet bed pan or urinal) 4  -KR     Putting on and taking off regular upper body clothing 4  -KR     Taking care of personal grooming (such as brushing teeth) 4  -KR     Eating meals 4  -KR     AM-PAC 6 Clicks Score (OT) 23  -KR       Row Name 09/28/24 1204 09/28/24 0849       How much help from another person do you currently need...    Turning from your back to your side while in flat bed without using bedrails? 4  -MS 4  -LJ    Moving from lying on back to sitting on the  side of a flat bed without bedrails? 4  -MS 4  -LJ    Moving to and from a bed to a chair (including a wheelchair)? 4  -MS 4  -LJ    Standing up from a chair using your arms (e.g., wheelchair, bedside chair)? 4  -MS 4  -LJ    Climbing 3-5 steps with a railing? 4  -MS 3  -LJ    To walk in hospital room? 4  -MS 2  -LJ    AM-PAC 6 Clicks Score (PT) 24  -MS 21  -LJ    Highest Level of Mobility Goal 8 --> Walked 250 feet or more  -MS 6 --> Walk 10 steps or more  -LJ      Row Name 09/28/24 1250 09/28/24 1204       Functional Assessment    Outcome Measure Options AM-PAC 6 Clicks Daily Activity (OT)  -KR AM-PAC 6 Clicks Basic Mobility (PT)  -MS              User Key  (r) = Recorded By, (t) = Taken By, (c) = Cosigned By      Initials Name Provider Type    Manuel Jacobsen, PT Physical Therapist    Ann Marie Dick, OT Occupational Therapist    Maya Duenas, RN Registered Nurse                    OT Recommendation and Plan  Therapy Frequency (OT): evaluation only  Plan of Care Review  Plan of Care Reviewed With: patient  Outcome Evaluation: Pt seen for OT eval this AM. Admitted with L knee pain found to have a large joint effusion and medal meniscus tear. She was able to perform all mobility with no assist from therapist this morning. Discussed LBD dressing strategy and pt demonstrates understanding. Pt to follow up with orthopedic office on Monday per chart. Encouraged pt to continue mobilizing to and from the bathroom while in acute. No OT needs at this time.  Plan of Care Reviewed With: patient  Outcome Evaluation: Pt seen for OT eval this AM. Admitted with L knee pain found to have a large joint effusion and medal meniscus tear. She was able to perform all mobility with no assist from therapist this morning. Discussed LBD dressing strategy and pt demonstrates understanding. Pt to follow up with orthopedic office on Monday per chart. Encouraged pt to continue mobilizing to and from the bathroom while in acute.  No OT needs at this time.     Time Calculation:         Time Calculation- OT       Row Name 09/28/24 1250             Time Calculation- OT    OT Start Time 0900  -KR      OT Stop Time 0910  -KR      OT Time Calculation (min) 10 min  -KR      OT Received On 09/28/24  -KR         Untimed Charges    OT Eval/Re-eval Minutes 10  -KR         Total Minutes    Untimed Charges Total Minutes 10  -KR       Total Minutes 10  -KR                User Key  (r) = Recorded By, (t) = Taken By, (c) = Cosigned By      Initials Name Provider Type    Ann Marie Dick OT Occupational Therapist                  Therapy Charges for Today       Code Description Service Date Service Provider Modifiers Qty    66631373882 HC OT EVAL LOW COMPLEXITY 2 9/28/2024 Ann Marie Zelaya OT GO 1               OT Discharge Summary  Anticipated Discharge Disposition (OT): home, home with assist    Ann Marie Zelaya OT  9/28/2024

## 2024-09-28 NOTE — PLAN OF CARE
Goal Outcome Evaluation:  Plan of Care Reviewed With: patient         Pt. is currently independent with functional mobility and has no further questions/concerns regarding functional mobility or home safety.  Encouraged pt. to continue ambulation with nursing staff while here in the hospital.  Otherwise, P.T. will sign off.          Anticipated Discharge Disposition (PT): home (Possible follow up with outpt. P.T. if pain does not resolve or pt. has an increase in functional deficits.)

## 2024-09-28 NOTE — PLAN OF CARE
Goal Outcome Evaluation:  Plan of Care Reviewed With: patient           Outcome Evaluation: Pt seen for OT eval this AM. Admitted with L knee pain found to have a large joint effusion and medal meniscus tear. She was able to perform all mobility with no assist from therapist this morning. Discussed LBD dressing strategy and pt demonstrates understanding. Pt to follow up with orthopedic office on Monday per chart. Encouraged pt to continue mobilizing to and from the bathroom while in acute. No OT needs at this time.      Anticipated Discharge Disposition (OT): home, home with assist

## 2024-09-28 NOTE — THERAPY DISCHARGE NOTE
Patient Name: Brittany Sheppard  : 1980    MRN: [id number]                              Today's Date: 2024       Admit Date: 2024    Visit Dx:     ICD-10-CM ICD-9-CM   1. Injury of left knee, initial encounter  S89.92XA 959.7     Patient Active Problem List   Diagnosis    Abscess of axilla, left    Lumbago    Acute hepatitis C without hepatic coma    Generalized anxiety disorder    Asthma    Atopic contact dermatitis    Benign hypertension    Breast abscess of female    Cervical high risk HPV (human papillomavirus) test positive    Chondromalacia of right patella    Chronic pain of right knee    Gastroesophageal reflux disease    Hamstring tightness of right lower extremity    Gastric banding status    History of smoking    HSV-2 seropositive    Intractable chronic migraine without aura and without status migrainosus    LGSIL (low grade squamous intraepithelial dysplasia)    Patellofemoral syndrome of both knees    Pes anserinus bursitis of right knee    Primary osteoarthritis of both knees    Pruritic rash    Sepsis    Vitamin D deficiency    Osteoarthritis of multiple joints    Acute cystitis with hematuria    Generalized abdominal pain    KENDRA on CPAP    Bipolar 1 disorder, depressed, moderate    Alcohol use disorder, moderate, in early remission    Obesity, Class III, BMI 40-49.9 (morbid obesity)    Dietary counseling    Pre-op evaluation    Fatty liver    PCOS (polycystic ovarian syndrome)    Fibromyalgia    Left knee pain     Past Medical History:   Diagnosis Date    Acute recurrent frontal sinusitis 2022    Anemia     Anxiety     Arthritis     Asthma     Back pain     Bipolar 1 disorder     COPD (chronic obstructive pulmonary disease)     Depression     Dyspnea     Elevated liver enzymes     GERD (gastroesophageal reflux disease)     H. pylori infection     treated 2016    Hepatitis C 2015    harvoni completed; attributed to tatoo    Hirsutism     History of migraine headaches      with scotomata    HPV in female 04/23/2021    HPV in female 03/2014    HPV in female 10/2015    Hypertension     IBS (irritable bowel syndrome)     Insomnia     LGSIL on Pap smear of cervix 01/2016    cx biopsy confoirmed LGSIL    Morbid obesity with body mass index of 50.0-59.9 in adult 06/24/2014    Obesity     PCOS (polycystic ovarian syndrome)     PCR positive for herpes simplex virus type 1 (HSV-1) DNA 2018    Preop cardiovascular exam 10/10/2014    Sleep apnea     Steatosis of liver     liver biopsy; moderate; stage 3, grade 0    Vitamin D deficiency      Past Surgical History:   Procedure Laterality Date    APPENDECTOMY  07/19/2010    [facility]    BREAST SURGERY      I & D of abcess X3    CYST REMOVAL  09/2008    D & C HYSTEROSCOPY ENDOMETRIAL ABLATION  12/16/2010    menorrhagia; pathology benign    LAPAROSCOPIC GASTRIC BANDING  03/27/2017    Dr. Prieto Colon [facility]    LIVER BIOPSY      POLYPECTOMY      TOOTH EXTRACTION  2016    WISDOM TOOTH EXTRACTION  2012      General Information       Row Name 09/28/24 1202          Physical Therapy Time and Intention    Document Type discharge evaluation/summary  -MS     Mode of Treatment physical therapy;individual therapy  -MS       Row Name 09/28/24 1202          General Information    Patient Profile Reviewed yes  -MS     Prior Level of Function independent:  -MS     Barriers to Rehab none identified  -MS       Row Name 09/28/24 1202          Cognition    Orientation Status (Cognition) oriented x 3  -MS               User Key  (r) = Recorded By, (t) = Taken By, (c) = Cosigned By      Initials Name Provider Type    Mnauel Jacobsen, PT Physical Therapist                   Mobility       Row Name 09/28/24 1202          Bed Mobility    Bed Mobility supine-sit;sit-supine  -MS     Supine-Sit [place] (Bed Mobility) independent  -MS     Sit-Supine [place] (Bed Mobility) independent  -MS       Row Name 09/28/24 1202          Sit-Stand Transfer    Sit-Stand  [place] (Transfers) independent  -MS       Row Name 09/28/24 1202          Gait/Stairs (Locomotion)    [place] Level (Gait) independent  -MS     Distance in Feet (Gait) 80  -MS     Deviations/Abnormal Patterns (Gait) antalgic  -MS     Comment, (Gait/Stairs) No balance deficits noted.  Very mild limp due to Left knee pain.  -MS               User Key  (r) = Recorded By, (t) = Taken By, (c) = Cosigned By      Initials Name Provider Type    MS Petrona Manuel GARCIA, PT Physical Therapist                   Obj/Interventions       Row Name 09/28/24 1203          Range of Motion Comprehensive    Comment, General Range of Motion BUE/LE (WFL's)  -MS       Row Name 09/28/24 1203          Strength Comprehensive (MMT)    Comment, General Manual Muscle Testing (MMT) Assessment BUE/RLE (4/5); LLE (3/5) due to pain  -MS               User Key  (r) = Recorded By, (t) = Taken By, (c) = Cosigned By      Initials Name Provider Type    MS RussManuel, PT Physical Therapist                   Goals/Plan    No documentation.                  Clinical Impression       [facility] Name 09/28/24 1203          Pain    Pretreatment Pain Rating 6/10  -MS     Posttreatment Pain Rating 6/10  -MS     Pain Location - Side/Orientation Left  -MS     Pain Location - knee  -MS     Pre/Posttreatment Pain Comment Pain has improved with medicine, rest, and ice application per pt. report  -MS     Pain Intervention(s) Medication (See MAR);Elevated;Nursing Notified;Repositioned  -MS       Row Name 09/28/24 1203          Plan of Care Review    Plan of Care Reviewed With patient  -MS       [facility] Name 09/28/24 1203          Therapy Assessment/Plan (PT)    Criteria for Skilled Interventions Met (PT) no problems identified which require skilled intervention  -MS       Row Name 09/28/24 1203          Positioning and Restraints    Pre-Treatment Position in bed  -MS     Post Treatment Position bed  -MS     In Bed notified nsg;supine;call light within  reach;encouraged to call for assist  -MS               User Key  (r) = Recorded By, (t) = Taken By, (c) = Cosigned By      Initials Name Provider Type    Manuel Jacobsen, PT Physical Therapist                   Outcome Measures       Row Name 09/28/24 1204 09/28/24 0849       How much help from another person do you currently need...    Turning from your back to your side while in flat bed without using bedrails? 4  -MS 4  -LJ    Moving from lying on back to sitting on the side of a flat bed without bedrails? 4  -MS 4  -LJ    Moving to and from a bed to a chair (including a wheelchair)? 4  -MS 4  -LJ    Standing up from a chair using your arms (e.g., wheelchair, bedside chair)? 4  -MS 4  -LJ    Climbing 3-5 steps with a railing? 4  -MS 3  -LJ    To walk in hospital room? 4  -MS 2  -LJ    AM-PAC 6 Clicks Score (PT) 24  -MS 21  -LJ    Highest Level of Mobility Goal 8 --> Walked 250 feet or more  -MS 6 --> Walk 10 steps or more  -LJ      Row Name 09/28/24 1204          Functional Assessment    Outcome Measure Options AM-PAC 6 Clicks Basic Mobility (PT)  -MS               User Key  (r) = Recorded By, (t) = Taken By, (c) = Cosigned By      Initials Name Provider Type    Manuel Jacobsen, PT Physical Therapist    Maya Duenas, RN Registered Nurse                  Physical Therapy Education       Title: PT OT SLP Therapies (Resolved)       Topic: Physical Therapy (Resolved)       Point: Mobility training (Resolved)       Learning Progress Summary             Patient Acceptance, E,D, VU,DU by MS at 9/28/2024 1204                         Point: Body mechanics (Resolved)       Learning Progress Summary             Patient Acceptance, E,D, VU,DU by MS at 9/28/2024 1204                         Point: Precautions (Resolved)       Learning Progress Summary             Patient Acceptance, E,D, VU,DU by MS at 9/28/2024 1204                                         User Key       Initials Effective Dates Name Provider  Type Discipline    MS 06/16/21 -  Manuel Russ, PT Physical Therapist PT                  PT Recommendation and Plan     Plan of Care Reviewed With: patient     Time Calculation:         PT Charges       Row Name 09/28/24 1205             Time Calculation    Start Time 0900  -MS      Stop Time 0910  -MS      Time Calculation (min) 10 min  -MS      PT Received On 09/28/24  -MS         Time Calculation- PT    Total Timed Code Minutes- PT 10 minute(s)  -MS                User Key  (r) = Recorded By, (t) = Taken By, (c) = Cosigned By      Initials Name Provider Type    MS Manuel Russ, PT Physical Therapist                  Therapy Charges for Today       Code Description Service Date Service Provider Modifiers Qty    13500883653 HC PT EVAL LOW COMPLEXITY 2 9/28/2024 Manuel Russ, PT GP 1            PT G-Codes  Outcome Measure Options: AM-PAC 6 Clicks Basic Mobility (PT)  AM-PAC 6 Clicks Score (PT): 24    PT Discharge Summary  Anticipated Discharge Disposition (PT): home  Reason for Discharge: Independent  Discharge Destination: Home    Manuel Russ PT  9/28/2024

## 2024-09-28 NOTE — PLAN OF CARE
Goal Outcome Evaluation:  Plan of Care Reviewed With: patient        Progress: no change  Outcome Evaluation: Assumed care of patient. Patient is alert and oriented x4, on supplemental oxygen at 2L INO2 overnight for KENDRA. Patient refused to wear her knee support and stated that she does not have any weight bearing restrictions and is able to slowly move her Left leg. Patient c/o intermittent aching pain on her left knee-provider was notified and pain meds were given per order.  stayed at bedside with patient. As per provider, no need for repeat labs to be taken in the morning for the patient. Await Ortho/PT/OT review in the morning. Encouraged symptoms reporting. Plan of care ongoing.

## 2024-09-28 NOTE — DISCHARGE SUMMARY
ED OBSERVATION PROGRESS/DISCHARGE SUMMARY    Date of Admission: 9/27/2024   LOS: 0 days   PCP: Jane Buenrostro APRN    Final Diagnosis: Left knee pain    Hospital Outcome:     44-year-old female admitted to the observation unit for further management due to left knee pain.  Initial lab work in the emergency department was unremarkable.  X-ray of the left knee shows a mild knee effusion is present.  No acute fracture, erosion, or dislocation is identified.  Mild degenerative spurring is seen.      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 Correlate with laboratory values and possible joint fluid analysis if clinically indicated. 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 Full-thickness chondral defect at the posterior medial femoral condyle and full-thickness chondral fissuring at the posterior lateral femoral condyle. Partial-thickness curvilinear radial tear through the medial meniscus posterior horn inner free margin. No displaced meniscal fragment. Mild edema adjacent to the otherwise intact proximal MCL could reflect mild grade 1 sprain. Nonaggressive appearing bone lesion in the posterior intercondylar distal femur, possible benign intraosseous ganglion or enchondroma.    Orthopedic surgery was consulted.  They will follow-up with patient in the office on Monday for joint aspiration.  Physical therapy has been consulted to evaluate and treat the patient in the AM.  She was able to ambulate.  Hydrocodone prescribed as needed.    ROS:  General: no fevers, chills  Respiratory: no cough, dyspnea  Cardiovascular: no chest pain, palpitations  Abdomen: No abdominal pain, nausea, vomiting, or diarrhea  Neurologic: No focal weakness  MS: + Left knee pain    Objective   Physical Exam:  I have reviewed the vital signs.  Temp:  [97.1 °F (36.2 °C)-98.8 °F (37.1 °C)] 98.5 °F (36.9  °C)  Heart Rate:  [73-85] 74  Resp:  [17-18] 18  BP: (140-153)/(81-95) 146/95  General Appearance:    Alert, cooperative, no distress  Head:    Normocephalic, atraumatic  Eyes:    Sclerae anicteric  Neck:   Supple, no mass  Lungs: Clear to auscultation bilaterally, respirations unlabored  Heart: Regular rate and rhythm, S1 and S2 normal, no murmur, rub or gallop  Abdomen:  Soft, nontender, bowel sounds active, nondistended  Extremities: No clubbing, cyanosis, or edema to lower extremities  Pulses:  2+ and symmetric in distal lower extremities  Skin: No rashes   Neurologic: Oriented x3, Normal strength to extremities    Results Review:    I have reviewed the labs, radiology results and diagnostic studies.    Results from last 7 days   Lab Units 09/27/24  0857   WBC 10*3/mm3 8.00   HEMOGLOBIN g/dL 13.1   HEMATOCRIT % 37.8   PLATELETS 10*3/mm3 294     Results from last 7 days   Lab Units 09/27/24  0857   SODIUM mmol/L 139   POTASSIUM mmol/L 4.1   CHLORIDE mmol/L 105   CO2 mmol/L 23.5   BUN mg/dL 8   CREATININE mg/dL 0.77   CALCIUM mg/dL 8.9   BILIRUBIN mg/dL 0.2   ALK PHOS U/L 65   ALT (SGPT) U/L 19   AST (SGOT) U/L 25   GLUCOSE mg/dL 99     Imaging Results (Last 24 Hours)       Procedure Component Value Units Date/Time    MRI Knee Left Without Contrast [[id number]] Collected: 09/27/24 1324     Updated: 09/27/24 1343    Narrative:      MRI KNEE LEFT  WO CONTRAST-     Date of Exam: 9/27/2024 10:25 AM     Indication: Left knee pain status post fall yesterday. Initial injury on  8/22/2024 when her a pop while turning a corner running.     Comparison: Radiographs 9/27/2024 and 9/17/2024.     Technique: Multiplanar, multisequence MR imaging of the knee was  performed without contrast.     FINDINGS:  Evaluation is mild limited by the wide field of view utilized.     Soft tissues: Large joint effusion with diffuse synovial thickening and  intra-articular debris. Mild frond-like fatty proliferation in the  suprapatellar joint  space could reflect lipomatosis arborescens. Fluid  between the semimembranosus tendon and the medial head gastrocnemius  muscle likely reflect a ruptured popliteal cyst. Patchy anterior soft  tissue edema. No solid soft tissue mass.     Menisci: Partial-thickness curvilinear radial tear through the medial  meniscus posterior horn. The lateral meniscus is intact. No displaced  meniscal fragment.     Cruciate Ligaments: The ACL is intact.  The PCL is intact.     Collateral ligaments: Soft tissue edema adjacent to the otherwise intact  proximal MCL could reflect mild grade 1 sprain. The LCL complex is  intact.     Extensor Mechanism: The quadriceps tendon is intact.  The patellar  tendon is intact.     Articular cartilage: Diffuse grade II-III chondromalacia of the patella  with multifocal full-thickness and/or near full-thickness chondral  fissuring at the patellar ridge and lateral patellar facet. Focal  full-thickness 10 mm x 7 mm chondral defect at the posterior medial  femoral condyle. Focal full-thickness chondral fissuring at the  posterior lateral femoral condyle.     Bones: Intermediate T1/high T2 signal bone lesion in the posterior  intercondylar distal femur, measuring 1.3 cm in diameter and  demonstrating a thin peripheral low signal rim and foci of low signal  internally, probably benign intraosseous ganglion or possibly an  enchondroma. Tiny subchondral cystic changes at the patella, likely  related to overlying chondromalacia. Tiny subcortical cystic changes in  the posterior medial proximal tibia, also likely benign intraosseous  ganglion formation. No acute fracture. No concerning bone marrow lesions  or marrow replacing process.       Impression:         1. 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 Correlate with laboratory values and possible joint  fluid analysis if clinically indicated.  2.  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  3. Full-thickness chondral defect at the posterior medial femoral  condyle and full-thickness chondral fissuring at the posterior lateral  femoral condyle.  4. Partial-thickness curvilinear radial tear through the medial meniscus  posterior horn inner free margin. No displaced meniscal fragment.  5. Mild edema adjacent to the otherwise intact proximal MCL could  reflect mild grade 1 sprain.  6. Nonaggressive appearing bone lesion in the posterior intercondylar  distal femur, possible benign intraosseous ganglion or enchondroma.  Could consider correlating with CT the evaluate for chondroid matrix or  follow-up MRI in 3 to 6 months if clinically indicated.           This report was finalized on 9/27/2024 1:40 PM by Godfrey Vasquez MD on  Workstation: [id number]               I have reviewed the medications.  ---------------------------------------------------------------------------------------------  Assessment & Plan   Assessment/Problem List    Left knee pain    Plan:    Left knee pain  -MRI left knee shows large joint effusion with diffuse sign of a thickening, but partial-thickness curvilinear radial tear through the medial meniscus posterior horn and her free margin.  Nonaggressive appearing bone lesion in the posterior intercondylar distal femur most likely benign.  -Discussed MRI findings with Ortho, patient to follow-up in the office on Monday for joint aspiration  -PT and OT has evaluated patient  -Hydrocodone as needed     Bipolar   -Continue home regimen    Disposition: Home    Follow-up after Discharge: Primary care, orthopedic surgery    This note will serve as a discharge summary    Nani Alcantar, APRN 09/28/24 10:19 EDT    I have worn appropriate PPE during this patient encounter, sanitized my hands both with entering and exiting patient's room.    35 minutes has been spent by [facility] providers in the care of this  patient while under observation status

## 2024-09-30 ENCOUNTER — TELEPHONE (OUTPATIENT)
Dept: PSYCHIATRY | Facility: CLINIC | Age: 44
End: 2024-09-30
Payer: COMMERCIAL

## 2024-09-30 ENCOUNTER — TRANSITIONAL CARE MANAGEMENT TELEPHONE ENCOUNTER (OUTPATIENT)
Dept: CALL CENTER | Facility: HOSPITAL | Age: 44
End: 2024-09-30
Payer: COMMERCIAL

## 2024-09-30 ENCOUNTER — TELEPHONE (OUTPATIENT)
Dept: ORTHOPEDIC SURGERY | Facility: CLINIC | Age: 44
End: 2024-09-30

## 2024-09-30 NOTE — TELEPHONE ENCOUNTER
"Last Friday, [facility] said Cap[employer] would be $100 with PA approval.  Today, I Called the pt to let her know she can text the word \"Caplyta\" to [id number] to get a copay card.  It says pay as little as $0 for the first 2 fills and $15 for subsequent fills, since the free voucher the drug rep gave us didn't work.      We might have to cancel her samples and put them back on the shelf.  I printed the copay card option and put the stack of papers with the samples.  "

## 2024-09-30 NOTE — TELEPHONE ENCOUNTER
Caller: Brittany Sheppard     Relationship: SELF     Best call back number: [phone]    What is your medical concern? LEFT KNEE PAIN    How long has this issue been going on? ONGOING    Is your provider already aware of this issue? YES    Have you been treated for this issue? YES    PT HAS WENT TO THE ER OVER THE WEEKEND FOR THE LEFT KNEE.. THE ER HAS COMPLETED AND MRI AND XRAY ON 9/27/24 BOTH IN [place].. PT IS WANTING TO BE SEEN ASAP.. PLEASE ADVISE..

## 2024-09-30 NOTE — OUTREACH NOTE
Call Center TCM Note      Flowsheet Row Responses   [facility], [place] by [facility] patient discharged from? [facility]   Does the patient have one of the following disease processes/diagnoses(primary or secondary)? Other   TCM attempt successful? Yes   Call start time 0854   Call end time 0858   Discharge diagnosis Left knee pain   Meds reviewed with patient/caregiver? Yes   Is the patient having any side effects they believe may be caused by any medication additions or changes? No   Does the patient have all medications ordered at discharge? Yes   Is the patient taking all medications as directed (includes completed medication regime)? Yes   Comments Patient will shcedule PCP follow up when she get Ortho  appt scheduled.   Does the patient have an appointment with their PCP within 7-14 days of discharge? No   Nursing Interventions Patient declined scheduling/rescheduling appointment at this time, Routed TCM call to PCP office   Has home health visited the patient within 72 hours of discharge? N/A   Psychosocial issues? No   Did the patient receive a copy of their discharge instructions? Yes   Nursing interventions Reviewed instructions with patient   What is the patient's perception of their health status since discharge? Same   Is the patient/caregiver able to teach back signs and symptoms related to disease process for when to call PCP? Yes   Is the patient/caregiver able to teach back signs and symptoms related to disease process for when to call 911? Yes   Is the patient/caregiver able to teach back the hierarchy of who to call/visit for symptoms/problems? PCP, Specialist, Home health nurse, Urgent Care, ED, 911 Yes   If the patient is a current smoker, are they able to teach back resources for cessation? Not a smoker   TCM call completed? Yes   Wrap up additional comments Knee pain has not improved since d/c, patient has call into Ortho office for appt.   Call end time 0858   Would this patient benefit from a Referral to  Amb Social Work? No   Is the patient interested in additional calls from an ambulatory ? No            Lara Rocha RN    9/30/2024, 09:00 EDT

## 2024-10-02 RX ORDER — METOPROLOL SUCCINATE 50 MG/1
50 TABLET, EXTENDED RELEASE ORAL DAILY
Qty: 30 TABLET | Refills: 0 | Status: SHIPPED | OUTPATIENT
Start: 2024-10-02

## 2024-10-03 ENCOUNTER — OFFICE VISIT (OUTPATIENT)
Dept: ORTHOPEDIC SURGERY | Facility: CLINIC | Age: 44
End: 2024-10-03
Payer: COMMERCIAL

## 2024-10-03 VITALS — BODY MASS INDEX: 46.15 KG/M2 | TEMPERATURE: 97.7 F | WEIGHT: 277 LBS | HEIGHT: 65 IN

## 2024-10-03 DIAGNOSIS — M17.12 PRIMARY OSTEOARTHRITIS OF LEFT KNEE: Primary | ICD-10-CM

## 2024-10-03 DIAGNOSIS — M25.562 PAIN AND SWELLING OF LEFT KNEE: ICD-10-CM

## 2024-10-03 DIAGNOSIS — S83.8X2A INJURY OF MENISCUS OF LEFT KNEE, INITIAL ENCOUNTER: ICD-10-CM

## 2024-10-03 DIAGNOSIS — M25.462 PAIN AND SWELLING OF LEFT KNEE: ICD-10-CM

## 2024-10-03 RX ADMIN — METHYLPREDNISOLONE ACETATE 80 MG: 80 INJECTION, SUSPENSION INTRA-ARTICULAR; INTRALESIONAL; INTRAMUSCULAR; SOFT TISSUE at 16:12

## 2024-10-03 RX ADMIN — LIDOCAINE HYDROCHLORIDE 2 ML: 10 INJECTION, SOLUTION EPIDURAL; INFILTRATION; INTRACAUDAL; PERINEURAL at 16:12

## 2024-10-08 RX ORDER — METHYLPREDNISOLONE ACETATE 80 MG/ML
80 INJECTION, SUSPENSION INTRA-ARTICULAR; INTRALESIONAL; INTRAMUSCULAR; SOFT TISSUE
Status: COMPLETED | OUTPATIENT
Start: 2024-10-03 | End: 2024-10-03

## 2024-10-08 RX ORDER — LIDOCAINE HYDROCHLORIDE 10 MG/ML
2 INJECTION, SOLUTION EPIDURAL; INFILTRATION; INTRACAUDAL; PERINEURAL
Status: COMPLETED | OUTPATIENT
Start: 2024-10-03 | End: 2024-10-03

## 2024-10-17 ENCOUNTER — OFFICE VISIT (OUTPATIENT)
Dept: OBSTETRICS AND GYNECOLOGY | Facility: CLINIC | Age: 44
End: 2024-10-17
Payer: COMMERCIAL

## 2024-10-17 ENCOUNTER — OFFICE VISIT (OUTPATIENT)
Dept: ORTHOPEDIC SURGERY | Facility: CLINIC | Age: 44
End: 2024-10-17
Payer: COMMERCIAL

## 2024-10-17 VITALS
HEART RATE: 82 BPM | SYSTOLIC BLOOD PRESSURE: 151 MMHG | BODY MASS INDEX: 46.45 KG/M2 | WEIGHT: 278.8 LBS | HEIGHT: 65 IN | DIASTOLIC BLOOD PRESSURE: 96 MMHG

## 2024-10-17 VITALS — TEMPERATURE: 98.4 F | WEIGHT: 278 LBS | HEIGHT: 65 IN | BODY MASS INDEX: 46.32 KG/M2

## 2024-10-17 DIAGNOSIS — Z01.419 ENCOUNTER FOR WELL WOMAN EXAM: Primary | ICD-10-CM

## 2024-10-17 DIAGNOSIS — M17.12 PRIMARY OSTEOARTHRITIS OF LEFT KNEE: Primary | ICD-10-CM

## 2024-10-17 DIAGNOSIS — Z12.4 CERVICAL CANCER SCREENING: ICD-10-CM

## 2024-10-17 DIAGNOSIS — Z12.31 BREAST CANCER SCREENING BY MAMMOGRAM: ICD-10-CM

## 2024-10-17 DIAGNOSIS — M25.562 PAIN AND SWELLING OF LEFT KNEE: ICD-10-CM

## 2024-10-17 DIAGNOSIS — Z11.3 SCREENING EXAMINATION FOR STD (SEXUALLY TRANSMITTED DISEASE): ICD-10-CM

## 2024-10-17 DIAGNOSIS — M25.462 PAIN AND SWELLING OF LEFT KNEE: ICD-10-CM

## 2024-10-17 DIAGNOSIS — Z87.42 HISTORY OF ABNORMAL CERVICAL PAP SMEAR: ICD-10-CM

## 2024-10-17 NOTE — PROGRESS NOTES
GYN Annual Exam     CC- Here for annual exam.     Brittany Sheppard is a 44 y.o. female who presents for annual well woman exam. Periods are irregular - she will skip a month every now and then, lasting 6 days with clots. Dysmenorrhea:moderate, occurring throughout menses. Cyclic symptoms include none. No intermenstrual bleeding, spotting, or discharge.    OB History          0    Para   0    Term   0       0    AB   0    Living   0         SAB   0    IAB   0    Ectopic   0    Molar   0    Multiple   0    Live Births   0                Current contraception: none  History of abnormal Pap smear:  yes - 24- LSIL, ASC-H, HR other HPV positive--> 10/19/22- colposcopy showed NATHANAEL 1 at 5:00, NATHANAEL 2 at 7:00, ECC benign   Family history of uterine, colon or ovarian cancer: no  History of abnormal mammogram:  n/a  Family history of breast cancer: yes - maternal and paternal grandmothers had breast cancer.   Last Pap : 10/12/23- NILM, negative HPV   Last Completed Pap Smear            PAP SMEAR (Every 3 Years) Next due on 10/12/2026      10/12/2023  IGP,CtNgTv,Apt HPV,rfx 16 / 18,45    2022  IGP, Apt HPV,rfx 16 / 18,45                   Last mammogram: n/a   Last Completed Mammogram       This patient has no relevant Health Maintenance data.             Past Medical History:   Diagnosis Date    Acute recurrent frontal sinusitis 2022    Anemia     Anxiety     Arthritis     Asthma     Back pain     Bipolar 1 disorder     COPD (chronic obstructive pulmonary disease)     Depression     Dyspnea     Elevated liver enzymes     GERD (gastroesophageal reflux disease)     H. pylori infection     treated 2016    Hepatitis C 2015    harvoni completed; attributed to tatoo    Hirsutism     History of migraine headaches     with scotomata    HPV in female 2021    HPV in female 2014    HPV in female 10/2015    Hypertension     IBS (irritable bowel syndrome)     Insomnia     LGSIL on Pap smear of  cervix 01/2016    cx biopsy confoirmed LGSIL    Morbid obesity with body mass index of 50.0-59.9 in adult 06/24/2014    Obesity     PCOS (polycystic ovarian syndrome)     PCR positive for herpes simplex virus type 1 (HSV-1) DNA 2018    Preop cardiovascular exam 10/10/2014    Sleep apnea     Steatosis of liver     liver biopsy; moderate; stage 3, grade 0    Vitamin D deficiency        Past Surgical History:   Procedure Laterality Date    APPENDECTOMY  07/19/2010    [facility] [place]    BREAST SURGERY      I & D of abcess X3    CYST REMOVAL  09/2008    D & C HYSTEROSCOPY ENDOMETRIAL ABLATION  12/16/2010    menorrhagia; pathology benign    LAPAROSCOPIC GASTRIC BANDING  03/27/2017    Dr. Prieto Colon [facility]    LIVER BIOPSY      POLYPECTOMY      TOOTH EXTRACTION  2016    WISDOM TOOTH EXTRACTION  2012         Current Outpatient Medications:     albuterol sulfate  (90 Base) MCG/ACT inhaler, Inhale 2 puffs Every 4 (Four) Hours As Needed for Wheezing or Shortness of Air., Disp: 18 g, Rfl: 0    calcium carbonate (TUMS) 500 MG chewable tablet, Chew 1 tablet As Needed for Indigestion or Heartburn., Disp: , Rfl:     gabapentin (Neurontin) 100 MG capsule, Take 1 capsule by mouth 3 (Three) Times a Day., Disp: 90 capsule, Rfl: 2    hydrOXYzine pamoate (Vistaril) 50 MG capsule, Take 1-2 capsules by mouth 3 (Three) Times a Day As Needed for Sleep/Anxiety, Disp: 90 capsule, Rfl: 1    Lumateperone Tosylate 10.5 MG capsule, Take 1 capsule by mouth Every Night., Disp: 30 capsule, Rfl: 0    Melatonin 10 MG tablet, Take 1 tablet by mouth Every Night., Disp: , Rfl:     metoprolol succinate XL (TOPROL-XL) 50 MG 24 hr tablet, Take 1 tablet by mouth Daily., Disp: 30 tablet, Rfl: 0    sertraline (Zoloft) 100 MG tablet, Take 2 tablets by mouth Daily., Disp: 60 tablet, Rfl: 1    valACYclovir (Valtrex) 500 MG tablet, Take 1 tablet by mouth 2 (Two) Times a Day., Disp: 90 tablet, Rfl: 3    diclofenac (VOLTAREN) 50 MG EC tablet, Take 1 tablet by mouth 3  (Three) Times a Day. (Patient not taking: Reported on 10/3/2024), Disp: 21 tablet, Rfl: 0    HYDROcodone-acetaminophen (NORCO) 5-325 MG per tablet, Take 1 tablet by mouth Every 6 (Six) Hours As Needed for Moderate Pain. (Patient not taking: Reported on 10/17/2024), Disp: 12 tablet, Rfl: 0    methocarbamol (ROBAXIN) 750 MG tablet, Take 1 tablet by mouth 3 (Three) Times a Day. (Patient not taking: Reported on 10/3/2024), Disp: 21 tablet, Rfl: 0    Allergies   Allergen Reactions    Cephalexin Hives    Penicillins Anaphylaxis and Hives    Prunus Persica Anaphylaxis    Adhesive Tape Other (See Comments)     Pulls skin off    Cefaclor Hives    Codeine Hives    Morphine Hives and Itching    Latex Hives and Rash     Hives and anaphlaxic         Social History     Tobacco Use    Smoking status: Former     Current packs/day: 0.00     Types: Cigarettes     Quit date:      Years since quittin.7     Passive exposure: Never    Smokeless tobacco: Never   Vaping Use    Vaping status: Never Used   Substance Use Topics    Alcohol use: Yes     Comment: off and on- won't drink for weeks and then will have a week where she drinks several days    Drug use: Not Currently     Types: Marijuana       Family History   Problem Relation Age of Onset    Hypertension Mother     Obesity Mother     Diabetes Mother     Sleep apnea Mother     Bipolar disorder Father     Diabetes Father     Hypertension Father     Migraines Father     Alcohol abuse Father     Stroke Maternal Grandmother     Breast cancer Maternal Grandmother 72    Diabetes Maternal Grandmother     Hypertension Maternal Grandmother     Stroke Maternal Grandfather     Cancer Maternal Grandfather     Stroke Paternal Grandmother     Breast cancer Paternal Grandmother     Hypertension Paternal Grandmother     Pancreatic cancer Paternal Grandmother     Schizophrenia Nephew     Bipolar disorder Nephew        Review of Systems   All other systems reviewed and are negative.      BP  "151/96   Pulse 82   Ht 165.1 cm (65\")   Wt 126 kg (278 lb 12.8 oz)   LMP 09/06/2024 (Exact Date)   BMI 46.39 kg/m²     Physical Exam  Vitals reviewed. Exam conducted with a chaperone present.   Constitutional:       General: She is not in acute distress.  HENT:      Head: Normocephalic and atraumatic.      Right Ear: External ear normal.      Left Ear: External ear normal.   Eyes:      Extraocular Movements: Extraocular movements intact.      Pupils: Pupils are equal, round, and reactive to light.   Cardiovascular:      Rate and Rhythm: Normal rate.   Pulmonary:      Effort: Pulmonary effort is normal. No respiratory distress.   Chest:   Breasts:     Right: No swelling, bleeding, inverted nipple, mass, nipple discharge, skin change or tenderness.      Left: No swelling, bleeding, inverted nipple, mass, nipple discharge, skin change or tenderness.   Abdominal:      General: There is no distension.      Palpations: Abdomen is soft.      Tenderness: There is no abdominal tenderness. There is no guarding or rebound.   Genitourinary:     General: Normal vulva.      Exam position: Lithotomy position.      Labia:         Right: No rash, tenderness, lesion or injury.         Left: No rash, tenderness, lesion or injury.       Urethra: No prolapse or urethral swelling.      Vagina: No vaginal discharge, erythema, tenderness, bleeding or lesions.      Cervix: Normal.      Uterus: Not enlarged, not fixed and not tender.       Adnexa:         Right: No mass, tenderness or fullness.          Left: No mass, tenderness or fullness.     Musculoskeletal:         General: No deformity. Normal range of motion.      Cervical back: Normal range of motion and neck supple.   Lymphadenopathy:      Upper Body:      Right upper body: No supraclavicular or axillary adenopathy.      Left upper body: No supraclavicular or axillary adenopathy.      Lower Body: No right inguinal adenopathy. No left inguinal adenopathy.   Skin:     General: " Skin is warm and dry.   Neurological:      General: No focal deficit present.      Mental Status: She is alert and oriented to person, place, and time.   Psychiatric:         Mood and Affect: Mood normal.         Behavior: Behavior normal.            Assessment     1) GYN annual well woman exam.   2) Cervical cancer screening  3) History of abnormal pap smear   4) Breast cancer screening   5) STD screening      Plan     1) Breast Health - Clinical breast exam & mammogram yearly, Self breast awareness monthly. Mammogram ordered for breast cancer screening.   2) Pap - Collected pap smear with HPV for cervical cancer screening today.   3) Smoking status- former smoker.   4) Contraception- none  5) Activity recommends - Adult 150-300 min/week of multi-component physical activities that include balance training, aerobic and physical strengthening.    6) Irregular periods- Discussed with the patient that once she does not have a period for a year, she will be considered menopausal. We discussed average age of menopause is 51.   7) STD screening- Collected GC/CT/Trich probe today on pap smear for STD screening.   8) Follow up prn and one year      Stefany Wilburn MD

## 2024-10-20 NOTE — PROGRESS NOTES
Patient: Brittany Sheppard  YOB: 1980 44 y.o. female  Medical Record Number: [id number]    Chief Complaints:   Chief Complaint   Patient presents with    Left Knee - Follow-up, Pain       History of Present Illness:Brittany Sheppard is a 44 y.o. female who presents for follow-up of left knee pain.  Patient she is injection upon last visit states things are improving still wearing the brace some.  Does not feel she is at baseline but positively progressing.    Allergies:   Allergies   Allergen Reactions    Cephalexin Hives    Penicillins Anaphylaxis and Hives    Prunus Persica Anaphylaxis    Adhesive Tape Other (See Comments)     Pulls skin off    Cefaclor Hives    Codeine Hives    Morphine Hives and Itching    Latex Hives and Rash     Hives and anaphlaxic         Medications:   Current Outpatient Medications   Medication Sig Dispense Refill    albuterol sulfate  (90 Base) MCG/ACT inhaler Inhale 2 puffs Every 4 (Four) Hours As Needed for Wheezing or Shortness of Air. 18 g 0    calcium carbonate (TUMS) 500 MG chewable tablet Chew 1 tablet As Needed for Indigestion or Heartburn.      gabapentin (Neurontin) 100 MG capsule Take 1 capsule by mouth 3 (Three) Times a Day. 90 capsule 2    hydrOXYzine pamoate (Vistaril) 50 MG capsule Take 1-2 capsules by mouth 3 (Three) Times a Day As Needed for Sleep/Anxiety 90 capsule 1    Lumateperone Tosylate 10.5 MG capsule Take 1 capsule by mouth Every Night. 30 capsule 0    Melatonin 10 MG tablet Take 1 tablet by mouth Every Night.      metoprolol succinate XL (TOPROL-XL) 50 MG 24 hr tablet Take 1 tablet by mouth Daily. 30 tablet 0    sertraline (Zoloft) 100 MG tablet Take 2 tablets by mouth Daily. 60 tablet 1    valACYclovir (Valtrex) 500 MG tablet Take 1 tablet by mouth 2 (Two) Times a Day. 90 tablet 3    diclofenac (VOLTAREN) 50 MG EC tablet Take 1 tablet by mouth 3 (Three) Times a Day. (Patient not taking: Reported on 10/3/2024) 21 tablet 0     "HYDROcodone-acetaminophen (NORCO) 5-325 MG per tablet Take 1 tablet by mouth Every 6 (Six) Hours As Needed for Moderate Pain. (Patient not taking: Reported on 10/3/2024) 12 tablet 0    methocarbamol (ROBAXIN) 750 MG tablet Take 1 tablet by mouth 3 (Three) Times a Day. (Patient not taking: Reported on 10/3/2024) 21 tablet 0     No current facility-administered medications for this visit.         The following portions of the patient's history were reviewed and updated as appropriate: allergies, current medications, past family history, past medical history, past social history, past surgical history and problem list.    Review of Systems:   A 14 point review of systems was performed. All systems negative except pertinent positives/negative listed in HPI above    Physical Exam:   Vitals:    10/17/24 1611   Temp: 98.4 °F (36.9 °C)   TempSrc: Temporal   Weight: 126 kg (278 lb)   Height: 165.1 cm (65\")   PainSc:   5   PainLoc: Knee       General: A and O x 3, ASA, NAD    SCLERA:    Normal    DENTITION:   Normal    Left knee examined no significant tenderness gentle motion much improved.  Motor and sensory intact distally.          Assessment/Plan:    Left knee pain swelling, arthritis    Patient is progressing.  Continue conservative treatment.  To starting some therapy recommend continuing therapy for the next several weeks to remain off work if able to allow time for continued improvement.  Plan to have patient follow-up in 2 weeks all questions answered.  "

## 2024-10-21 ENCOUNTER — HOSPITAL ENCOUNTER (OUTPATIENT)
Dept: PHYSICAL THERAPY | Facility: HOSPITAL | Age: 44
Setting detail: THERAPIES SERIES
Discharge: HOME | End: 2024-10-21
Payer: COMMERCIAL

## 2024-10-21 DIAGNOSIS — G89.29 CHRONIC PAIN OF LEFT KNEE: Primary | ICD-10-CM

## 2024-10-21 DIAGNOSIS — R26.9 IMPAIRED GAIT: ICD-10-CM

## 2024-10-21 DIAGNOSIS — M25.562 CHRONIC PAIN OF LEFT KNEE: Primary | ICD-10-CM

## 2024-10-21 DIAGNOSIS — S83.242D ACUTE MEDIAL MENISCUS TEAR OF LEFT KNEE, SUBSEQUENT ENCOUNTER: ICD-10-CM

## 2024-10-21 PROCEDURE — 97162 PT EVAL MOD COMPLEX 30 MIN: CPT

## 2024-10-21 PROCEDURE — 97110 THERAPEUTIC EXERCISES: CPT

## 2024-10-21 NOTE — THERAPY EVALUATION
Outpatient Physical Therapy Ortho Initial Evaluation  [facility]     Patient Name: Brittany Sheppard  : 1980  MRN: [id number]  Today's Date: 10/21/2024      Visit Date: 10/21/2024    Patient Active Problem List   Diagnosis    Abscess of axilla, left    Lumbago    Acute hepatitis C without hepatic coma    Generalized anxiety disorder    Asthma    Atopic contact dermatitis    Benign hypertension    Breast abscess of female    Cervical high risk HPV (human papillomavirus) test positive    Chondromalacia of right patella    Chronic pain of right knee    Gastroesophageal reflux disease    Hamstring tightness of right lower extremity    Gastric banding status    History of smoking    HSV-2 seropositive    Intractable chronic migraine without aura and without status migrainosus    LGSIL (low grade squamous intraepithelial dysplasia)    Patellofemoral syndrome of both knees    Pes anserinus bursitis of right knee    Primary osteoarthritis of both knees    Pruritic rash    Sepsis    Vitamin D deficiency    Osteoarthritis of multiple joints    Acute cystitis with hematuria    Generalized abdominal pain    KENDRA on CPAP    Bipolar 1 disorder, depressed, moderate    Alcohol use disorder, moderate, in early remission    Obesity, Class III, BMI 40-49.9 (morbid obesity)    Dietary counseling    Pre-op evaluation    Fatty liver    PCOS (polycystic ovarian syndrome)    Fibromyalgia    Left knee pain        Past Medical History:   Diagnosis Date    Acute recurrent frontal sinusitis 2022    Anemia     Anxiety     Arthritis     Asthma     Back pain     Bipolar 1 disorder     COPD (chronic obstructive pulmonary disease)     Depression     Dyspnea     Elevated liver enzymes     GERD (gastroesophageal reflux disease)     H. pylori infection     treated 2016    Hepatitis C 2015    harvoni completed; attributed to tatoo    Hirsutism     History of migraine headaches     with scotomata    HPV in female 2021    HPV  in female 03/2014    HPV in female 10/2015    Hypertension     IBS (irritable bowel syndrome)     Insomnia     LGSIL on Pap smear of cervix 01/2016    cx biopsy confoirmed LGSIL    Morbid obesity with body mass index of 50.0-59.9 in adult 06/24/2014    Obesity     PCOS (polycystic ovarian syndrome)     PCR positive for herpes simplex virus type 1 (HSV-1) DNA 2018    Preop cardiovascular exam 10/10/2014    Sleep apnea     Steatosis of liver     liver biopsy; moderate; stage 3, grade 0    Vitamin D deficiency         Past Surgical History:   Procedure Laterality Date    APPENDECTOMY  07/19/2010    [facility] [place]    BREAST SURGERY      I & D of abcess X3    CYST REMOVAL  09/2008    D & C HYSTEROSCOPY ENDOMETRIAL ABLATION  12/16/2010    menorrhagia; pathology benign    LAPAROSCOPIC GASTRIC BANDING  03/27/2017    Dr. Prieto Colon [facility]    LIVER BIOPSY      POLYPECTOMY      TOOTH EXTRACTION  2016    WISDOM TOOTH EXTRACTION  2012       Visit Dx:     ICD-10-CM ICD-9-CM   1. Chronic pain of left knee  M25.562 719.46    G89.29 338.29   2. Acute medial meniscus tear of left knee, subsequent encounter  S83.242D V58.89     836.0   3. Impaired gait  R26.9 781.2          Patient History       Row Name 10/21/24 0800             History    Chief Complaint Pain  -DR      Type of Pain Knee pain  -DR      Date Current Problem(s) Began 08/22/24  -      Brief Description of Current Complaint Brittany Sheppard is a 44 y.o. female who presents to physical therapy today with primary compliant of L knee pain that began 08/22/24 after jumping up from a chair and hearing a pop in her leg. MRI showing partial-thickness tear through medial meniscus and possible mild grade 1 sprain of proximal MCL. Two weeks ago, pt received an injection into L knee, reporting improvements in overall symptoms and gait pattern. [address] [place] home and lives in 2-story home, ascending and descending step-to pattern backwards in order to navigate. (+) sleep disturbances 3-4x/night  due to aching, (+) buckling sensations and swelling in L knee, (-) N/T down B LE. [place] Sheppard reports difficulty/increased pain with sitting still >15 min, standing in place for 5-10 min, walking for small periods, stairs.  Pain relieving factors include injections, heating pad, changing positions. Pt currently off of work (no light-duty available) with no current RTW date. Works as a medical assistant, requiring sitting, standing, walking tasks. PMH includes anxiety, HTN, GERD, bipolar disorder, obesity class 3, FMS. Brittany Sheppard primary goal for attending skilled physical therapy is to return to work.  -      Previous treatment for THIS PROBLEM Injections  2 weeks ago  -      Patient/Caregiver Goals Relieve pain;Return to prior level of function;Return to work;Improve strength;Improve mobility;Know what to do to help the symptoms  -DR      Patient/Caregiver Goals Comment return to work  -      Hand Dominance right-handed  -DR      Occupation/sports/leisure activities medical assistant- both standing/sitting job; singing [employer]  -      What clinical tests have you had for this problem? 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, and ruptured  popliteal cyst, moderate to advanced patellar chondromalacia, full-thickness chondral defect at the posterior medial femoral  condyle and full-thickness chondral fissuring at the posterior lateral femoral condyle, partial-thickness curvilinear radial tear through the medial meniscus posterior horn inner free margin, mild edema adjacent to the otherwise intact proximal MCL could reflect mild grade 1 sprain  -DR         Pain     Pain Location Knee  -DR      Pain at Present 4  -DR      Pain at Best 4  -DR      Pain Frequency Constant/continuous  -DR      Pain Description Aching;Throbbing  -DR      What Performance Factors Make the Current Problem(s) WORSE?  sitting still >15 min, standing in place for 5-10 min, walking for small periods, stairs  -DR      What Performance Factors Make the Current Problem(s) BETTER? injections, heating pad, changing positions  -DR      Tolerance Time- Standing 5-10 minutes  -DR      Tolerance Time- Sitting 15-20 minutes  -DR      Tolerance Time- Walking 5 min  -DR      Is your sleep disturbed? Yes  -DR      Difficulties at work? off work as of now  -DR      Difficulties with ADL's? no  -DR         Fall Risk Assessment    Any falls in the past year: Yes  -DR      Number of falls reported in the last 12 months 1  -DR      Factors that contributed to the fall: Other (comment)  knee gave out going down steps  -DR         Services    Prior Rehab/Home Health Experiences No  -DR         Daily Activities    Primary Language English  -DR      How does patient learn best? Demonstration;Pictures/Video;Listening;Reading  -DR      Barriers to learning None  -DR      Pt Participated in POC and Goals Yes  -DR         Safety    Are you being hurt, hit, or frightened by anyone at home or in your life? No  -DR      Are you being neglected by a caregiver No  -DR      Have you had any of the following issues with N/A  -DR                User Key  (r) = Recorded By, (t) = Taken By, (c) = Cosigned By      Initials Name Provider Type    Roger Boyle, PT Physical Therapist                     PT Ortho       Row Name 10/21/24 0800       Subjective    Subjective Comments eval  -DR       Posture/Observations    Alignment Options Genu valgus  -DR    Genu valgus Bilateral:;Mild;Moderate  -DR       Quarter Clearing    Quarter Clearing Lower Quarter Clearing  -DR       Sensory Screen for Light Touch- Lower Quarter Clearing    L1 (inguinal area) Intact  -DR    L2 (anterior mid thigh) Intact  -DR    L3 (distal anterior thigh) Intact  -DR    L4 (medial lower leg/foot) Intact  -DR    L5 (lateral lower leg/great toe) Intact  -DR    S1 (bottom of foot)  Left:;Diminished  -DR       Myotomal Screen- Lower Quarter Clearing    Hip flexion (L2) Left:;4 (Good);Right:;5 (Normal)  -DR    Knee extension (L3) Left:;4- (Good -);Right:;5 (Normal)  -DR    Ankle DF (L4) Bilateral:;5 (Normal)  -DR    Knee flexion (S2) Left:;4- (Good -);Right:;4+ (Good +)  -DR       Special Tests/Palpation    Special Tests/Palpation Knee  -DR       Knee Palpation    Knee Palpation? Yes  -DR    Patella Tendon Left:;Tender  -DR    Medial Joint Line Left:;Tender  -DR    Lateral Joint Line Left:;Tender  -DR    Lateral Condyle Left:;Tender  -DR    Medial Condyle Left:;Tender  -DR    Semimembranosis Left:;Tender  -DR       Patellar Accessory Motions    Patellar Accessory Motions Tested? Yes  -DR    Superior glide Left:;Hypomobile  -DR    Inferior glide Left:;Hypomobile  -DR    Medial glide Left:;WNL  -DR    Lateral glide Left:;WNL  -DR       Knee Special Tests    Anterior drawer (ACL lesion) Bilateral:;Negative  -DR    Posterior drawer (PCL lesion) Bilateral:;Negative  -DR    Valgus stress (MCL lesion) Left:;Positive  -DR    Varus stress (LCL lesion) Left:;Positive  pain on medial side  -DR       General ROM    RT Lower Ext Rt Knee Extension/Flexion  -DR    LT Lower Ext Lt Knee Extension/Flexion  -DR       Right Lower Ext    Rt Knee Extension/Flexion AROM 0-2-125  -DR       Left Lower Ext    Lt Knee Extension/Flexion AROM 0-3-112  -DR       MMT (Manual Muscle Testing)    Rt Lower Ext Rt Hip ABduction;Rt Hip Extension;Rt Hip ADduction  -DR    Lt Lower Ext Lt Hip ABduction;Lt Hip Extension;Lt Hip ADduction  -DR       MMT Right Lower Ext    Rt Hip ABduction MMT, Gross Movement (4/5) good  -DR       MMT Left Lower Ext    Lt Hip ABduction MMT, Gross Movement (4-/5) good minus  -DR       Sensation    Sensation WNL? WFL  -DR    Light Touch Partial deficits in the LLE  -DR       Flexibility    Flexibility Tested? Lower Extremity  -DR       Lower Extremity Flexibility    Hamstrings Bilateral:;Mildly limited  -        Gait/Stairs (Locomotion)    Comment, (Gait/Stairs) antalgic gait pattern, decreased stance phase time on LLE, decreased heel strike bilaterally, fwd flexed position  -              User Key  (r) = Recorded By, (t) = Taken By, (c) = Cosigned By      Initials Name Provider Type    Roger Boyle, PT Physical Therapist                                Therapy Education  Education Details: Educated on PT role and POC; discussed expectations/timeframe for healing. Provided HEP, Access Code: [email or web address]  Given: HEP, Symptoms/condition management  Program: New  How Provided: Verbal, Demonstration, Written  Provided to: Patient  Level of Understanding: Teach back education performed, Verbalized, Demonstrated      PT OP Goals       Row Name 10/21/24 0900          PT Short Term Goals    STG Date to Achieve 11/20/24  -     STG 1 Pt will be independent with initial HEP to improve strength/ROM and decrease pain.  -     STG 1 Progress New  -     STG 2 Pt will improve L knee AROM to at least 0-120 deg to improve ability to navigate stairs.  -     STG 2 Progress New  -     STG 3 Pt will improve walking tolerance to >20 minutes with no instances of buckling to assist with returning to work.  -     STG 3 Progress New  -     STG 4 --  -     STG 4 Progress --  -        Long Term Goals    LTG Date to Achieve 12/20/24  -     LTG 1 Pt will be independent with advance HEP to improve strength/ROM and decrease pain.  -     LTG 1 Progress New  -     LTG 2 Pt will improve B LE strength to at least 4+/5.  -     LTG 2 Progress New  -     LTG 3 Pt will be able to ascend/descend stairs in a reciprocal pattern with no increase pain.  -     LTG 3 Progress New  -     LTG 4 Pt will report </= 2/10 pain with ADLs to improve quality of life.  -     LTG 4 Progress New  -     LTG 5 Pt will improve KOS score to 70% to show improved quality of life.  -     LTG 5 Progress New  -     LTG 6 Patient will improve  ability to sleep through the night without sleep disturbances related to back pain to improve overall sleep quality and quality of life.  -     LTG 6 Progress New  -DR     LTG 7 --  -DR     LTG 7 Progress --  -DR        Time Calculation    PT Goal Re-Cert Due Date 01/19/25  -               User Key  (r) = Recorded By, (t) = Taken By, (c) = Cosigned By      Initials Name Provider Type    Roger Boyle, PT Physical Therapist                     PT Assessment/Plan       Row Name 10/21/24 0900          PT Assessment    Functional Limitations Decreased safety during functional activities;Impaired gait;Limitation in home management;Limitations in community activities;Limitations in functional capacity and performance;Performance in leisure activities;Performance in self-care ADL;Performance in work activities  -DR     Impairments Balance;Endurance;Gait;Joint integrity;Joint mobility;Muscle strength;Pain;Poor body mechanics;Posture;Range of motion;Sensation  -DR     Assessment Comments Brittany Sheppard is a 44 y.o. year-old female referred to physical therapy for L knee pain. She presents with a evolving clinical presentation. MRI showing partial-thickness tear through medial meniscus and possible mild grade 1 sprain of proximal MCL. Two weeks ago, pt received an injection into L knee, reporting improvements in overall symptoms and gait pattern. [place] home and lives in 2-story home, ascending and descending step-to pattern backwards in order to navigate. (+) sleep disturbances 3-4x/night due to aching, (+) buckling sensations and swelling in L knee, (-) N/T down B LE. Pertinent comorbidities and personal factors that may affect progress in the plan of care include, but are not limited to, anxiety, HTN, GERD, bipolar disorder, obesity class 3, FMS, imaging, job requirements, and chronicity of symptoms. Self scored disability measure of KOS-ADL was a 31.43%, where 100% is no perceived disability. She demonstrated  abnormal gait pattern, impaired postural alignment in standing, partial L LE sensation deficits, TTP multiple areas of L knee, hypomobile patellar accessory motions, impaired L AROM knee flex/ext, impaired B LE strength, mild flexibility limitations, (+) valgus stress test, negative for further ligament testing. Pt currently off of work (no light-duty available) with no current RTW date. Works as a medical assistant, requiring sitting, standing, walking tasks. Signs and symptoms are consistent with referring diagnosis. She is appropriate for skilled therapy services at this time to address deficits and improve ease with job requirements upon returning to work.  -DR     Please refer to paper survey for additional self-reported information No  -DR     Rehab Potential Good  -DR     Patient/caregiver participated in establishment of treatment plan and goals Yes  -DR     Patient would benefit from skilled therapy intervention Yes  -DR        PT Plan    PT Frequency 2x/week  -     Predicted Duration of Therapy Intervention (PT) 6-8 visits  -     Planned CPT's? PT RE-EVAL: 07023;PT THER PROC EA 15 MIN: 33448;PT THER ACT EA 15 MIN: 65099;PT MANUAL THERAPY EA 15 MIN: 06512;PT NEUROMUSC RE-EDUCATION EA 15 MIN: 19612;PT GAIT TRAINING EA 15 MIN: 83914;PT SELF CARE/HOME MGMT/TRAIN EA 15: 44329;PT HOT OR COLD PACK TREAT MCARE;PT EVAL MOD COMPLELITY: 66871  -DR     PT Plan Comments print schedule; assess how pt responded to eval and new HEP; begin nustep and review HEP. consider QS+SLR, clamshells, STS with TB, side steps, small step ups to foam?  -               User Key  (r) = Recorded By, (t) = Taken By, (c) = Cosigned By      Initials Name Provider Type    Roger Boyle, PT Physical Therapist                       OP Exercises       Row Name 10/21/24 0800             Subjective    Subjective Comments eval  -DR         Total Minutes    55371 - PT Therapeutic Exercise Minutes 8  -DR         Exercise 1    Exercise  Name 1 nustep  -DR      Additional Comments next visit  -DR         Exercise 2    Exercise Name 2 supine QS  -DR      Cueing 2 Verbal;Demo  -DR      Sets 2 2  -DR      Reps 2 10  -DR      Time 2 5s  -DR         Exercise 3    Exercise Name 3 gastroc stretch off step  -DR      Cueing 3 Verbal;Demo  -DR      Reps 3 3  -DR      Time 3 20s  -DR         Exercise 4    Exercise Name 4 seated HS str  -DR      Cueing 4 Verbal;Demo  -DR      Sets 4 3ea  -DR      Reps 4 20s  -DR                User Key  (r) = Recorded By, (t) = Taken By, (c) = Cosigned By      Initials Name Provider Type    Roger Boyle, PT Physical Therapist                                  Outcome Measure Options: Knee Outcome Score- ADL  Knee Outcome Survey Activities of Daily Living Scale  Symptoms: Pain: The symptom affects my activity severely  Symptoms: Stiffness: The symptom affects my activity severely  Symptoms: Swelling: The symptom affects my activity severely  Symptoms: Giving way, buckling, or shifting of the knee: The symptom affects my activity severely  Symptoms: Weakness: The symptom affects my activity severely  Symptoms: Limping: The symptom affects my activity severely  Functional Limitations with ADL's: Walk: Activity is fairly difficult  Functional Limitations with ADL's: Go up stairs: Activity is fairly difficult  Functional Limitations with ADL's: Go down stairs: Activity is fairly difficult  Functional Limitations with ADL's: Stand: Activity is somewhat difficult  Functional Limitations with ADL's: Kneel on front of your knee: I am unable to  Functional Limitations with ADL's: Squat: I am unable to  Functional Limitations with ADL's: Sit with your knee bent: Activity is somewhat difficult  Functional Limitations with ADL's: Rise from a chair: Activity is minimally difficult  Knee Outcome Summary ADL's Score: 31.43 %      Time Calculation:     Start Time: 0903  Stop Time: 0941  Time Calculation (min): 38 min  Timed Charges  06528  - PT Therapeutic Exercise Minutes: 8  Total Minutes  Timed Charges Total Minutes: 8   Total Minutes: 8     Therapy Charges for Today       Code Description Service Date Service Provider Modifiers Qty    94501448208 HC PT THER PROC EA 15 MIN 10/21/2024 Roger Hilliard, PT GP 1    15332293435 HC PT EVAL MOD COMPLEXITY 2 10/21/2024 Roger Hilliard, PT GP 1            PT G-Codes  Outcome Measure Options: Knee Outcome Score- ADL         Roger Hilliard, PT  10/21/2024

## 2024-10-23 DIAGNOSIS — U07.1 COVID-19: ICD-10-CM

## 2024-10-23 RX ORDER — ALBUTEROL SULFATE 90 UG/1
2 INHALANT RESPIRATORY (INHALATION) EVERY 4 HOURS PRN
Qty: 18 G | Refills: 3 | Status: SHIPPED | OUTPATIENT
Start: 2024-10-23

## 2024-10-24 ENCOUNTER — OFFICE VISIT (OUTPATIENT)
Dept: INTERNAL MEDICINE | Age: 44
End: 2024-10-24
Payer: COMMERCIAL

## 2024-10-24 VITALS
RESPIRATION RATE: 18 BRPM | HEIGHT: 65 IN | WEIGHT: 281 LBS | DIASTOLIC BLOOD PRESSURE: 89 MMHG | HEART RATE: 75 BPM | BODY MASS INDEX: 46.82 KG/M2 | SYSTOLIC BLOOD PRESSURE: 140 MMHG | TEMPERATURE: 96.4 F | OXYGEN SATURATION: 96 %

## 2024-10-24 DIAGNOSIS — A59.9 TRICHOMONAS INFECTION: Primary | ICD-10-CM

## 2024-10-24 DIAGNOSIS — E66.01 OBESITY, CLASS III, BMI 40-49.9 (MORBID OBESITY): ICD-10-CM

## 2024-10-24 DIAGNOSIS — Z01.818 PRE-OP EVALUATION: Primary | ICD-10-CM

## 2024-10-24 DIAGNOSIS — I10 PRIMARY HYPERTENSION: ICD-10-CM

## 2024-10-24 LAB
C TRACH RRNA CVX QL NAA+PROBE: NEGATIVE
CYTOLOGIST CVX/VAG CYTO: ABNORMAL
CYTOLOGY CVX/VAG DOC CYTO: ABNORMAL
CYTOLOGY CVX/VAG DOC THIN PREP: ABNORMAL
DX ICD CODE: ABNORMAL
HPV GENOTYPE REFLEX: ABNORMAL
HPV I/H RISK 4 DNA CVX QL PROBE+SIG AMP: NEGATIVE
Lab: ABNORMAL
Lab: ABNORMAL
N GONORRHOEA RRNA CVX QL NAA+PROBE: NEGATIVE
OTHER STN SPEC: ABNORMAL
STAT OF ADQ CVX/VAG CYTO-IMP: ABNORMAL
T VAGINALIS RRNA SPEC QL NAA+PROBE: POSITIVE

## 2024-10-24 PROCEDURE — 99214 OFFICE O/P EST MOD 30 MIN: CPT | Performed by: PHYSICIAN ASSISTANT

## 2024-10-24 RX ORDER — METRONIDAZOLE 500 MG/1
500 TABLET ORAL 2 TIMES DAILY
Qty: 14 TABLET | Refills: 0 | Status: SHIPPED | OUTPATIENT
Start: 2024-10-24 | End: 2024-11-01

## 2024-10-24 RX ORDER — METOPROLOL SUCCINATE 100 MG/1
100 TABLET, EXTENDED RELEASE ORAL DAILY
Qty: 90 TABLET | Refills: 1 | Status: SHIPPED | OUTPATIENT
Start: 2024-10-24

## 2024-10-24 NOTE — PROGRESS NOTES
"                            MINAL TAI PA-C                  I  N  T  E  R  N  A  L    M  E  D  I  C  I  N  E         ENCOUNTER DATE:  10/24/2024    Brittany Sheppard / 44 y.o. / female    OFFICE VISIT ENCOUNTER       CHIEF COMPLAINT / REASON FOR OFFICE VISIT     Nutrition Counseling (Bariatric surgery clearance paper work) and Hypertension (Elevated blood pressure readings)      ASSESSMENT & PLAN     Problem List Items Addressed This Visit       Primary hypertension    Overview     Metoprolol succinate  mg once daily.         Current Assessment & Plan       Increase metoprolol succinate from 50 mg once daily to 100 mg once daily.         Relevant Medications    metoprolol succinate XL (TOPROL-XL) 100 MG 24 hr tablet    Obesity, Class III, BMI 40-49.9 (morbid obesity)    Pre-op evaluation - Primary     No orders of the defined types were placed in this encounter.    New Medications Ordered This Visit   Medications    metoprolol succinate XL (TOPROL-XL) 100 MG 24 hr tablet     Sig: Take 1 tablet by mouth Daily.     Dispense:  90 tablet     Refill:  1       SUMMARY/DISCUSSION  Bariatric surgical clearance paperwork completed and signed on today.  Form faxed to bariatric surgery office.  Hypertension: Increase metoprolol succinate XL from 50 mg every 24 hours to 100 mg every 24 hours.  Patient to record home blood pressure readings and return for follow-up with Jane.        Next Appointment:   Return for Follow-up with CHRIS Cardenas on 11/21/24.      VITAL SIGNS     Vitals:    10/24/24 1341   BP: 140/89   BP Location: Left arm   Patient Position: Sitting   Cuff Size: Large Adult   Pulse: 75   Resp: 18   Temp: 96.4 °F (35.8 °C)   TempSrc: Temporal   SpO2: 96%   Weight: 127 kg (281 lb)   Height: 165.1 cm (65\")       BP Readings from Last 3 Encounters:   10/24/24 140/89   10/17/24 151/96   09/28/24 164/97     Wt Readings from Last 3 Encounters:   10/24/24 127 kg (281 lb)   10/17/24 126 kg (278 lb) "   10/17/24 126 kg (278 lb 12.8 oz)     Body mass index is 46.76 kg/m².         No data to display                   MEDICATIONS AT THE TIME OF OFFICE VISIT     Current Outpatient Medications on File Prior to Visit   Medication Sig    albuterol sulfate HFA (Ventolin HFA) 108 (90 Base) MCG/ACT inhaler Inhale 2 puffs Every 4 (Four) Hours As Needed for Wheezing or Shortness of Air.    calcium carbonate (TUMS) 500 MG chewable tablet Chew 1 tablet As Needed for Indigestion or Heartburn.    gabapentin (Neurontin) 100 MG capsule Take 1 capsule by mouth 3 (Three) Times a Day.    hydrOXYzine pamoate (Vistaril) 50 MG capsule Take 1-2 capsules by mouth 3 (Three) Times a Day As Needed for Sleep/Anxiety    Lumateperone Tosylate 10.5 MG capsule Take 1 capsule by mouth Every Night.    Melatonin 10 MG tablet Take 1 tablet by mouth Every Night.    sertraline (Zoloft) 100 MG tablet Take 2 tablets by mouth Daily.    valACYclovir (Valtrex) 500 MG tablet Take 1 tablet by mouth 2 (Two) Times a Day.    [DISCONTINUED] metoprolol succinate XL (TOPROL-XL) 50 MG 24 hr tablet Take 1 tablet by mouth Daily.    [DISCONTINUED] diclofenac (VOLTAREN) 50 MG EC tablet Take 1 tablet by mouth 3 (Three) Times a Day. (Patient not taking: Reported on 10/3/2024)    [DISCONTINUED] HYDROcodone-acetaminophen (NORCO) 5-325 MG per tablet Take 1 tablet by mouth Every 6 (Six) Hours As Needed for Moderate Pain. (Patient not taking: Reported on 10/3/2024)    [DISCONTINUED] methocarbamol (ROBAXIN) 750 MG tablet Take 1 tablet by mouth 3 (Three) Times a Day. (Patient not taking: Reported on 10/3/2024)     No current facility-administered medications on file prior to visit.          HISTORY OF PRESENT ILLNESS     Pt is a 45y/o AAF with hypertension, fibromyalgia, and obesity who presents for completion of bariatric pre-surgical clearance paperwork.     Obesity/ Surgical Clearance for bariatric surgery: Weight at 281 pounds on today. She does not yet have a date scheduled  for her upcoming laparoscopic gastric sleeve procedure.  Psychiatric evaluation for weight loss surgery is still pending.  Lab work to be ordered as needed by bariatric surgery clinic.  She is scheduled for EGD on 10/29/2024. She has previously received an adjustable gastric band from 2866-5011, when she later had it removed due to recurrent reflux issues.    Hypertension: Takes Metoprolol XR 50 mg once daily. She reports continued elevations of her blood pressure when not in office. She admits home readings averaging 140-150 systolic and  diastolic. BP on today elevated at 140/89 with pulse of 75 bpm. She is currently taking her Metoprolol at 10 PM nightly, but she awakens to her blood pressure being elevated when the morning arrives. She is requesting a medication increase if possible.         Patient Care Team:  Jane Buenrostro APRN as PCP - General (Family Medicine)  Lilia Joshua PA-C as Physician Assistant (Colon and Rectal Surgery)  Isha Vickers MD as Consulting Physician (Psychiatry)    REVIEW OF SYSTEMS     Review of Systems   As Per HPI.  All other systems negative.     PHYSICAL EXAMINATION     Physical Exam  Vitals and nursing note reviewed.   Constitutional:       General: She is not in acute distress.     Appearance: Normal appearance. She is obese. She is not ill-appearing.   Cardiovascular:      Rate and Rhythm: Normal rate and regular rhythm.      Pulses: Normal pulses.      Heart sounds: Normal heart sounds. No murmur heard.     No friction rub. No gallop.   Pulmonary:      Effort: Pulmonary effort is normal. No respiratory distress.      Breath sounds: Normal breath sounds. No stridor. No wheezing.   Neurological:      Mental Status: She is alert.   Psychiatric:         Mood and Affect: Mood normal.         Behavior: Behavior normal.             REVIEWED DATA     Labs:     Lab Results   Component Value Date     09/27/2024    K 4.1 09/27/2024    CALCIUM 8.9 09/27/2024    AST 25  "09/27/2024    ALT 19 09/27/2024    BUN 8 09/27/2024    CREATININE 0.77 09/27/2024    CREATININE 0.91 08/26/2024    CREATININE 0.84 02/15/2024    EGFRRESULT 80.9 08/26/2024     Lab Results   Component Value Date    HGBA1C 4.90 02/15/2024    HGBA1C 5.40 08/23/2023    HGBA1C 5.30 09/14/2022     Lab Results   Component Value Date    LDL 94 08/23/2023    LDL 87 07/27/2022    LDL 78 08/24/2020    HDL 59 08/23/2023    HDL 53 07/27/2022    TRIG 60 08/23/2023    TRIG 63 07/27/2022     Lab Results   Component Value Date    TSH 1.200 02/15/2024    TSH 1.150 08/29/2023    TSH 0.997 08/23/2023    FREET4 1.03 02/15/2024    FREET4 0.90 (L) 08/23/2023    FREET4 1.27 07/27/2022     Lab Results   Component Value Date    WBC 8.00 09/27/2024    HGB 13.1 09/27/2024     09/27/2024   No results found for: \"MALBCRERATIO\"          Imaging:               Medical Tests:               Summary of old records / correspondence / consultant report:             Request outside records:       "

## 2024-10-25 ENCOUNTER — TELEPHONE (OUTPATIENT)
Dept: OBSTETRICS AND GYNECOLOGY | Facility: CLINIC | Age: 44
End: 2024-10-25
Payer: COMMERCIAL

## 2024-10-28 ENCOUNTER — TELEPHONE (OUTPATIENT)
Dept: ORTHOPEDIC SURGERY | Facility: CLINIC | Age: 44
End: 2024-10-28

## 2024-10-28 NOTE — TELEPHONE ENCOUNTER
Caller: FABIAN ASIF    Relationship: [employer]     Best call back number: [phone]*    What form or medical record are you requesting: OFFICE NOTES 09/18/24 , 10/03/24 & 10/17/24    Who is requesting this form or medical record from you: [employer]    How would you like to receive the form or medical records (pick-up, mail, fax): FAX  If fax, what is the fax number: [phone]*    Timeframe paperwork needed: ASAP    Additional notes: FABIAN ASKED IF THE OFFICE NOTES CAN BE SENT OVER AFTER EVERY VISIT.. PLEASE ADVISE..

## 2024-10-29 ENCOUNTER — HOSPITAL ENCOUNTER (OUTPATIENT)
Facility: HOSPITAL | Age: 44
Setting detail: HOSPITAL OUTPATIENT SURGERY
Discharge: HOME | End: 2024-10-29
Attending: SURGERY | Admitting: SURGERY
Payer: COMMERCIAL

## 2024-10-29 ENCOUNTER — ANESTHESIA EVENT (OUTPATIENT)
Dept: GASTROENTEROLOGY | Facility: HOSPITAL | Age: 44
End: 2024-10-29
Payer: COMMERCIAL

## 2024-10-29 ENCOUNTER — ANESTHESIA (OUTPATIENT)
Dept: GASTROENTEROLOGY | Facility: HOSPITAL | Age: 44
End: 2024-10-29
Payer: COMMERCIAL

## 2024-10-29 VITALS
HEART RATE: 73 BPM | WEIGHT: 280.6 LBS | RESPIRATION RATE: 12 BRPM | HEIGHT: 64 IN | SYSTOLIC BLOOD PRESSURE: 108 MMHG | BODY MASS INDEX: 47.91 KG/M2 | DIASTOLIC BLOOD PRESSURE: 71 MMHG | OXYGEN SATURATION: 96 %

## 2024-10-29 DIAGNOSIS — Z01.818 PRE-OP EVALUATION: ICD-10-CM

## 2024-10-29 DIAGNOSIS — K21.9 GASTROESOPHAGEAL REFLUX DISEASE, UNSPECIFIED WHETHER ESOPHAGITIS PRESENT: ICD-10-CM

## 2024-10-29 DIAGNOSIS — E66.01 OBESITY, CLASS III, BMI 40-49.9 (MORBID OBESITY): ICD-10-CM

## 2024-10-29 PROCEDURE — 25010000002 LIDOCAINE 2% SOLUTION: Performed by: REGISTERED NURSE

## 2024-10-29 PROCEDURE — 87081 CULTURE SCREEN ONLY: CPT | Performed by: SURGERY

## 2024-10-29 PROCEDURE — 25010000002 PROPOFOL 10 MG/ML EMULSION: Performed by: REGISTERED NURSE

## 2024-10-29 PROCEDURE — 43239 EGD BIOPSY SINGLE/MULTIPLE: CPT | Performed by: SURGERY

## 2024-10-29 PROCEDURE — S0260 H&P FOR SURGERY: HCPCS | Performed by: SURGERY

## 2024-10-29 PROCEDURE — 25810000003 SODIUM CHLORIDE 0.9 % SOLUTION: Performed by: SURGERY

## 2024-10-29 PROCEDURE — 81025 URINE PREGNANCY TEST: CPT | Performed by: SURGERY

## 2024-10-29 RX ORDER — SODIUM CHLORIDE 0.9 % (FLUSH) 0.9 %
10 SYRINGE (ML) INJECTION AS NEEDED
Status: DISCONTINUED | OUTPATIENT
Start: 2024-10-29 | End: 2024-10-29 | Stop reason: HOSPADM

## 2024-10-29 RX ORDER — SODIUM CHLORIDE 9 MG/ML
30 INJECTION, SOLUTION INTRAVENOUS CONTINUOUS PRN
Status: DISCONTINUED | OUTPATIENT
Start: 2024-10-29 | End: 2024-10-29 | Stop reason: HOSPADM

## 2024-10-29 RX ORDER — PROPOFOL 10 MG/ML
VIAL (ML) INTRAVENOUS AS NEEDED
Status: DISCONTINUED | OUTPATIENT
Start: 2024-10-29 | End: 2024-10-29 | Stop reason: SURG

## 2024-10-29 RX ORDER — ONDANSETRON 2 MG/ML
4 INJECTION INTRAMUSCULAR; INTRAVENOUS ONCE AS NEEDED
Status: DISCONTINUED | OUTPATIENT
Start: 2024-10-29 | End: 2024-10-29 | Stop reason: HOSPADM

## 2024-10-29 RX ORDER — ONDANSETRON 2 MG/ML
INJECTION INTRAMUSCULAR; INTRAVENOUS
Status: DISCONTINUED
Start: 2024-10-29 | End: 2024-10-29 | Stop reason: HOSPADM

## 2024-10-29 RX ORDER — SODIUM CHLORIDE 0.9 % (FLUSH) 0.9 %
10 SYRINGE (ML) INJECTION EVERY 12 HOURS SCHEDULED
Status: DISCONTINUED | OUTPATIENT
Start: 2024-10-29 | End: 2024-10-29 | Stop reason: HOSPADM

## 2024-10-29 RX ORDER — LIDOCAINE HYDROCHLORIDE 20 MG/ML
INJECTION, SOLUTION INFILTRATION; PERINEURAL AS NEEDED
Status: DISCONTINUED | OUTPATIENT
Start: 2024-10-29 | End: 2024-10-29 | Stop reason: SURG

## 2024-10-29 RX ORDER — SODIUM CHLORIDE 9 MG/ML
40 INJECTION, SOLUTION INTRAVENOUS AS NEEDED
Status: DISCONTINUED | OUTPATIENT
Start: 2024-10-29 | End: 2024-10-29 | Stop reason: HOSPADM

## 2024-10-29 RX ADMIN — PROPOFOL 50 MG: 10 INJECTION, EMULSION INTRAVENOUS at 09:32

## 2024-10-29 RX ADMIN — SODIUM CHLORIDE 30 ML/HR: 9 INJECTION, SOLUTION INTRAVENOUS at 08:35

## 2024-10-29 RX ADMIN — LIDOCAINE HYDROCHLORIDE 100 MG: 20 INJECTION, SOLUTION INFILTRATION; PERINEURAL at 09:30

## 2024-10-29 RX ADMIN — PROPOFOL 100 MG: 10 INJECTION, EMULSION INTRAVENOUS at 09:30

## 2024-10-29 RX ADMIN — PROPOFOL 50 MG: 10 INJECTION, EMULSION INTRAVENOUS at 09:31

## 2024-10-29 NOTE — ANESTHESIA POSTPROCEDURE EVALUATION
Patient: Brittany Sheppard    Procedure Summary       Date: 10/29/24 Room / Location:  [facility]    Anesthesia Start: 0927 Anesthesia Stop: 0939    Procedure: ESOPHAGOGASTRODUODENOSCOPY WITH BIOPSY (Esophagus) Diagnosis:       Obesity, Class III, BMI 40-49.9 (morbid obesity)      Gastroesophageal reflux disease, unspecified whether esophagitis present      Pre-op evaluation      (Obesity, Class III, BMI 40-49.9 (morbid obesity) [E66.01])      (Gastroesophageal reflux disease, unspecified whether esophagitis present [K21.9])      (Pre-op evaluation [Z01.818])    Surgeons: Melchor Duke Jr., MD Provider: Hal Deras MD    Anesthesia Type: MAC ASA Status: 3            Anesthesia Type: MAC    Vitals  Vitals Value Taken Time   /95 10/29/24 0948   Temp     Pulse 75 10/29/24 0948   Resp 22 10/29/24 0948   SpO2 97 % 10/29/24 0948           Post Anesthesia Care and Evaluation    Patient location during evaluation: PACU  Patient participation: complete - patient participated  Level of consciousness: awake and alert  Pain management: adequate    Airway patency: patent  Anesthetic complications: No anesthetic complications    Cardiovascular status: acceptable  Respiratory status: acceptable  Hydration status: acceptable    Comments: --------------------            10/29/24               0948     --------------------   BP:       129/95     Pulse:      75       Resp:       22       SpO2:      97%      --------------------

## 2024-10-29 NOTE — ANESTHESIA PREPROCEDURE EVALUATION
Anesthesia Evaluation     Patient summary reviewed and Nursing notes reviewed   history of anesthetic complications:  PONV               Airway   Mallampati: III  Dental      Pulmonary    (+) a smoker Former, COPD, asthma,shortness of breath, sleep apnea on CPAP  Cardiovascular     ECG reviewed  Patient on routine beta blocker  Rhythm: regular  Rate: normal    (+) hypertension      Neuro/Psych  (+) headaches, psychiatric history Depression, Anxiety and Bipolar  GI/Hepatic/Renal/Endo    (+) morbid obesity, GERD, hepatitis (treated) C, liver disease fatty liver disease    Musculoskeletal     (+) back pain  Abdominal    Substance History   (+) alcohol use     OB/GYN negative ob/gyn ROS         Other   arthritis,                 Anesthesia Plan    ASA 3     MAC     (Lyle used for GETA)  intravenous induction     Anesthetic plan, risks, benefits, and alternatives have been provided, discussed and informed consent has been obtained with: patient.    CODE STATUS:

## 2024-10-29 NOTE — OP NOTE
Surgeon: Melchor Duke Jr., M.D.    Preoperative Diagnosis: #1 dyspepsia #2 status post lap band removal December 2023 at [facility]    Postoperative Diagnosis: Gastritis    Procedure Performed: Tranzarel esophagogastroduodenoscopy with gastric antral biopsies    Indications: 44-year-old female who presents for preoperative evaluation.  Patient status post lap band removal December 2023 at [facility].  Patient does not take H2 blocker or PPI on regular basis    Procedure:     The procedure, indications, preparation and potential complications were explained to the patient, who indicated understanding and signed the corresponding consent forms.  The patient was identified, taken to the endoscopy suite, and placed on the left side down decubitus position.  The patient underwent a MAC anesthesia and was appropriately monitored through the case by the anesthesia personnel with continuous pulse oximetry, blood pressure, and cardiac monitoring.  A bite block was placed.  After adequate IV sedation and using a transoral technique a lubed flexible endoscope was placed in the hypopharynx and advanced to the second portion of the duodenum without difficulty. The scope was then withdrawn back into the antrum of the stomach.  Cold forcep biopsies of the antrum were taken to rule out Helicobacter pylori.  The scope was retroflexed noting the body, fundus and cardia.  The scope was then withdrawn back into the esophagus after decompressing the stomach.  The Z line was noted and GE junction measured from the incisors.  The scope was then completely withdrawn.  The patient tolerated the procedure well and left the endoscopy suite in stable condition.  The findings are listed below.    Duodenum: Unremarkable  Antrum: Patchy erythema  Body/Fundus: Patchy erythema  Cardia: Unremarkable  Esophagus: Z-line regular, no erosive esophagitis    Recommendations:     Await biopsy results and follow-up in the office

## 2024-10-29 NOTE — H&P
Patient Care Team:  Jane Buenrostro APRN as PCP - General (Family Medicine)  Lilia Joshua PA-C as Physician Assistant (Colon and Rectal Surgery)  Isha Vickers MD as Consulting Physician (Psychiatry)    Chief complaint Need of preoperative clearance prior to surgery    Subjective     Patient is a 44 y.o. female who is a patient of ours and has undergone our extensive initial evaluation for bariatric surgery and needs to proceed with upper endoscopy for preoperative clearance prior to proceeding with surgery.  Please see the initial history and physical for further detailed information.      Review of Systems   Pertinent items are noted in HPI and no changes since last visit.    Past Medical History:   Diagnosis Date    Acute recurrent frontal sinusitis 09/01/2022    Anemia     Anxiety     Arthritis     Asthma     Back pain     Bipolar 1 disorder     COPD (chronic obstructive pulmonary disease)     Depression     Dyspnea     Elevated liver enzymes     GERD (gastroesophageal reflux disease)     H. 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    HPV in female 10/2015    Hypertension     IBS (irritable bowel syndrome)     Insomnia     LGSIL on Pap smear of cervix 01/2016    cx biopsy confoirmed LGSIL    Morbid obesity with body mass index of 50.0-59.9 in adult 06/24/2014    Obesity     PCOS (polycystic ovarian syndrome)     PCR positive for herpes simplex virus type 1 (HSV-1) DNA 2018    PONV (postoperative nausea and vomiting)     Preop cardiovascular exam 10/10/2014    Sleep apnea     HAS A C-PAP MACHINE    Steatosis of liver     liver biopsy; moderate; stage 3, grade 0    Vitamin D deficiency      Past Surgical History:   Procedure Laterality Date    APPENDECTOMY  07/19/2010    [facility]    BREAST SURGERY       I & D of abcess X3    CYST REMOVAL  2008    D & C HYSTEROSCOPY ENDOMETRIAL ABLATION  2010    menorrhagia; pathology benign    LAPAROSCOPIC CHOLECYSTECTOMY      LAPAROSCOPIC GASTRIC BANDING  2017    Dr. Prieto Colon [facility]    LIVER BIOPSY      POLYPECTOMY      TOOTH EXTRACTION  2016    WISDOM TOOTH EXTRACTION  2012     Family History   Problem Relation Age of Onset    Hypertension Mother     Obesity Mother     Diabetes Mother     Sleep apnea Mother     Bipolar disorder Father     Diabetes Father     Hypertension Father     Migraines Father     Alcohol abuse Father     Stroke Maternal Grandmother     Breast cancer Maternal Grandmother 72    Diabetes Maternal Grandmother     Hypertension Maternal Grandmother     Stroke Maternal Grandfather     Cancer Maternal Grandfather     Stroke Paternal Grandmother     Breast cancer Paternal Grandmother     Hypertension Paternal Grandmother     Pancreatic cancer Paternal Grandmother     Schizophrenia Nephew     Bipolar disorder Nephew     Malig Hyperthermia Neg Hx      Social History     Tobacco Use    Smoking status: Former     Current packs/day: 0.00     Types: Cigarettes     Quit date:      Years since quittin.8     Passive exposure: Never    Smokeless tobacco: Never   Vaping Use    Vaping status: Never Used   Substance Use Topics    Alcohol use: Yes     Comment: off and on- won't drink for weeks and then will have a week where she drinks several days    Drug use: Never     Medications Prior to Admission   Medication Sig Dispense Refill Last Dose/Taking    albuterol sulfate HFA (Ventolin HFA) 108 (90 Base) MCG/ACT inhaler Inhale 2 puffs Every 4 (Four) Hours As Needed for Wheezing or Shortness of Air. 18 g 3 10/28/2024    calcium carbonate (TUMS) 500 MG chewable tablet Chew 1 tablet As Needed for Indigestion or Heartburn.   10/28/2024    gabapentin (Neurontin) 100 MG capsule Take 1 capsule by mouth 3 (Three) Times a Day. 90 capsule 2 10/28/2024    Lumateperone  "Tosylate 10.5 MG capsule Take 1 capsule by mouth Every Night. 30 capsule 0 10/28/2024    Melatonin 10 MG tablet Take 1 tablet by mouth Every Night.   10/28/2024    metoprolol succinate XL (TOPROL-XL) 100 MG 24 hr tablet Take 1 tablet by mouth Daily. 90 tablet 1 10/28/2024    metroNIDAZOLE (Flagyl) 500 MG tablet Take 1 tablet by mouth 2 (Two) Times a Day for 7 days. 14 tablet 0 10/28/2024    sertraline (Zoloft) 100 MG tablet Take 2 tablets by mouth Daily. 60 tablet 1 10/28/2024    valACYclovir (Valtrex) 500 MG tablet Take 1 tablet by mouth 2 (Two) Times a Day. 90 tablet 3 10/28/2024    hydrOXYzine pamoate (Vistaril) 50 MG capsule Take 1-2 capsules by mouth 3 (Three) Times a Day As Needed for Sleep/Anxiety 90 capsule 1 Unknown     Allergies:  Cephalexin, Penicillins, Prunus persica, Cefaclor, Morphine, Adhesive tape, Codeine, and Latex    Vital Signs  See PreOp record  Heart Rate:  [70] 70  Resp:  [16] 16  BP: (144)/(79) 144/79    Flowsheet Rows      Flowsheet Row First Filed Value   Admission Height 162.6 cm (64\") Documented at 10/28/2024 1049   Admission Weight 127 kg (280 lb 9.6 oz) Documented at 10/29/2024 0828             Physical Exam:   Heart: RR  Lungs: CTA B  Abd: soft and NT/ND  Ext: no clubbing, cyanosis    Results Review:    I have reviewed the patient's clinical results      Gastroesophageal reflux disease    Obesity, Class III, BMI 40-49.9 (morbid obesity)    Pre-op evaluation      The risks and benefits of the procedure were discussed with the patient in detail and all questions were answered.  Possibility of perforation, bleeding, aspiration, anoxic brain injury, respiratory and/or cardiac arrest and death were discussed.  Consent will be signed and witnessed..     Melchor Duke MD  10/29/24  08:38 EDT  Time: Approximately 15 minutes was spent with the patient.  All of the patients questions were answered.    "

## 2024-10-29 NOTE — DISCHARGE INSTRUCTIONS
For the next 24 hours patient needs to be with a responsible adult.    For THE REST OF TODAY DO NOT drive, operate machinery, appliances, drink alcohol, make important decisions or sign legal documents.    Start with a light or bland diet if you are feeling sick to your stomach otherwise advance to regular diet as tolerated.    Follow recommendations on procedure report if provided by your doctor.    Call Dr Duke for problems [phone]    Problems may include but not limited to: large amounts of bleeding, trouble breathing, repeated vomiting, severe unrelieved pain, fever or chills.      If biopsies or polyps were taken, MD will call you with the results in about 7 days. If you don't hear from the MD in 2 weeks, call the number above.

## 2024-10-31 ENCOUNTER — HOSPITAL ENCOUNTER (OUTPATIENT)
Dept: PHYSICAL THERAPY | Facility: HOSPITAL | Age: 44
Setting detail: THERAPIES SERIES
Discharge: HOME | End: 2024-10-31
Payer: COMMERCIAL

## 2024-10-31 DIAGNOSIS — S83.242D ACUTE MEDIAL MENISCUS TEAR OF LEFT KNEE, SUBSEQUENT ENCOUNTER: ICD-10-CM

## 2024-10-31 DIAGNOSIS — R26.9 IMPAIRED GAIT: ICD-10-CM

## 2024-10-31 DIAGNOSIS — M25.562 CHRONIC PAIN OF LEFT KNEE: Primary | ICD-10-CM

## 2024-10-31 DIAGNOSIS — A04.8 H. PYLORI INFECTION: Primary | ICD-10-CM

## 2024-10-31 DIAGNOSIS — G89.29 CHRONIC PAIN OF LEFT KNEE: Primary | ICD-10-CM

## 2024-10-31 LAB — UREASE TISS QL: POSITIVE

## 2024-10-31 PROCEDURE — 97110 THERAPEUTIC EXERCISES: CPT

## 2024-10-31 RX ORDER — METRONIDAZOLE 500 MG/1
500 TABLET ORAL 2 TIMES DAILY
Qty: 28 TABLET | Refills: 0 | Status: SHIPPED | OUTPATIENT
Start: 2024-10-31 | End: 2024-11-18

## 2024-10-31 RX ORDER — CLARITHROMYCIN 500 MG/1
500 TABLET ORAL 2 TIMES DAILY
Qty: 28 TABLET | Refills: 0 | Status: SHIPPED | OUTPATIENT
Start: 2024-10-31 | End: 2024-11-18

## 2024-10-31 RX ORDER — LANSOPRAZOLE 30 MG/1
30 CAPSULE, DELAYED RELEASE ORAL 2 TIMES DAILY
Qty: 28 CAPSULE | Refills: 0 | Status: SHIPPED | OUTPATIENT
Start: 2024-10-31 | End: 2024-11-18

## 2024-10-31 NOTE — THERAPY TREATMENT NOTE
Outpatient Physical Therapy Ortho Treatment Note  [facility]     Patient Name: Brittany Sheppard  : 1980  MRN: [id number]  Today's Date: 10/31/2024      Visit Date: 10/31/2024    Visit Dx:    ICD-10-CM ICD-9-CM   1. Chronic pain of left knee  M25.562 719.46    G89.29 338.29   2. Acute medial meniscus tear of left knee, subsequent encounter  S83.242D V58.89     836.0   3. Impaired gait  R26.9 781.2       Patient Active Problem List   Diagnosis    Abscess of axilla, left    Lumbago    Acute hepatitis C without hepatic coma    Generalized anxiety disorder    Asthma    Atopic contact dermatitis    Primary hypertension    Breast abscess of female    Cervical high risk HPV (human papillomavirus) test positive    Chondromalacia of right patella    Chronic pain of right knee    Gastroesophageal reflux disease    Hamstring tightness of right lower extremity    History of removal of laparoscopic gastric banding device    History of smoking    HSV-2 seropositive    Intractable chronic migraine without aura and without status migrainosus    LGSIL (low grade squamous intraepithelial dysplasia)    Patellofemoral syndrome of both knees    Pes anserinus bursitis of right knee    Primary osteoarthritis of both knees    Pruritic rash    Sepsis    Vitamin D deficiency    Osteoarthritis of multiple joints    Acute cystitis with hematuria    Generalized abdominal pain    KENDRA on CPAP    Bipolar 1 disorder, depressed, moderate    Alcohol use disorder, moderate, in early remission    Obesity, Class III, BMI 40-49.9 (morbid obesity)    Dietary counseling    Pre-op evaluation    Fatty liver    PCOS (polycystic ovarian syndrome)    Fibromyalgia    Left knee pain        Past Medical History:   Diagnosis Date    Acute recurrent frontal sinusitis 2022    Anemia     Anxiety     Arthritis     Asthma     Back pain     Bipolar 1 disorder     COPD (chronic obstructive pulmonary disease)     Depression     Dyspnea     Elevated liver  enzymes     GERD (gastroesophageal reflux disease)     H. 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    HPV in female 10/2015    Hypertension     IBS (irritable bowel syndrome)     Insomnia     LGSIL on Pap smear of cervix 01/2016    cx biopsy confoirmed LGSIL    Morbid obesity with body mass index of 50.0-59.9 in adult 06/24/2014    Obesity     PCOS (polycystic ovarian syndrome)     PCR positive for herpes simplex virus type 1 (HSV-1) DNA 2018    PONV (postoperative nausea and vomiting)     Preop cardiovascular exam 10/10/2014    Sleep apnea     HAS A C-PAP MACHINE    Steatosis of liver     liver biopsy; moderate; stage 3, grade 0    Vitamin D deficiency         Past Surgical History:   Procedure Laterality Date    APPENDECTOMY  07/19/2010    [facility] [place]    BREAST SURGERY      I & D of abcess X3    CYST REMOVAL  09/2008    D & C HYSTEROSCOPY ENDOMETRIAL ABLATION  12/16/2010    menorrhagia; pathology benign    ENDOSCOPY N/A 10/29/2024    Procedure: ESOPHAGOGASTRODUODENOSCOPY WITH BIOPSY;  Surgeon: Melchor Duke Jr., MD;  Location: [facility] ENDOSCOPY;  Service: General;  Laterality: N/A;  PRE- DYSPEPSIA, H/O BAND REMOVAL  POST- GASTRITIS    LAPAROSCOPIC CHOLECYSTECTOMY      LAPAROSCOPIC GASTRIC BANDING  03/27/2017    Dr. Prieto Colno APL    LIVER BIOPSY      POLYPECTOMY      TOOTH EXTRACTION  2016    WISDOM TOOTH EXTRACTION  2012                        PT Assessment/Plan       Row Name 10/31/24 1400          PT Assessment    Assessment Comments Brittany returns for first follow up from initial evaluation, reports compliance with HEP x2 days before pain increased and she was unable to complete. She is wearing compression hose due to continued swelling with increased activity. Performed gentle manual to L knee in an effort  to reduce pain and progress mobility. Demonstrates good quad contraction with QS, able to add SAQ and LAQ as well as hip adduction without complaint. Pt. remains appropriate for skilled PT.  -MP        PT Plan    PT Plan Comments clams, STS w/ TB, TKE?  -MP               User Key  (r) = Recorded By, (t) = Taken By, (c) = Cosigned By      Initials Name Provider Type    Va Mahmood, PT Physical Therapist                       OP Exercises       Row Name 10/31/24 1400             Subjective    Subjective Comments I did the exercises for 2 days and they hurt, the second day I couldn't do it anymore  -MP         Subjective Pain    Able to rate subjective pain? yes  -MP      Pre-Treatment Pain Level 5  -MP      Post-Treatment Pain Level 5  -MP         Total Minutes    07702 - PT Therapeutic Exercise Minutes 35  -MP      54902 - PT Manual Therapy Minutes 5  -MP         Exercise 1    Exercise Name 1 nustep  -MP      Cueing 1 Verbal  -MP      Time 1 5 min  -MP         Exercise 2    Exercise Name 2 supine QS  -MP      Cueing 2 Verbal;Demo  -MP      Reps 2 15  -MP      Time 2 5s  -MP         Exercise 4    Exercise Name 4 seated HS str  -MP      Cueing 4 Verbal;Demo  -MP      Sets 4 3ea  -MP      Reps 4 20s  -MP         Exercise 5    Exercise Name 5 SAQ  -MP      Cueing 5 Verbal  -MP      Sets 5 2  -MP      Reps 5 10ea  -MP         Exercise 6    Exercise Name 6 H/L hip add  -MP      Cueing 6 Verbal;Demo  -MP      Sets 6 2  -MP      Reps 6 10  -MP      Time 6 5s  -MP         Exercise 7    Exercise Name 7 LAQ  -MP      Cueing 7 Verbal;Demo  -MP      Reps 7 10R  -MP         Exercise 8    Exercise Name 8 heel slides  -MP      Cueing 8 Verbal;Demo  -MP      Reps 8 10  -MP      Time 8 5s  -MP                User Key  (r) = Recorded By, (t) = Taken By, (c) = Cosigned By      Initials Name Provider Type    Va Mahmood PT Physical Therapist                             Manual Rx (Last 36 Hours)       Manual Treatments        Row Name 10/31/24 1400             Total Minutes    38734 - PT Manual Therapy Minutes 5  -MP         Manual Rx 1    Manual Rx 1 Location retrograde STM distal quad, gentle patellar mobs  -MP                User Key  (r) = Recorded By, (t) = Taken By, (c) = Cosigned By      Initials Name Provider Type    Va Mahmood, PT Physical Therapist                     PT OP Goals       Row Name 10/31/24 1400          PT Short Term Goals    STG Date to Achieve 11/20/24  -MP     STG 1 Pt will be independent with initial HEP to improve strength/ROM and decrease pain.  -MP     STG 1 Progress Ongoing  -MP     STG 2 Pt will improve L knee AROM to at least 0-120 deg to improve ability to navigate stairs.  -MP     STG 2 Progress Ongoing  -MP     STG 3 Pt will improve walking tolerance to >20 minutes with no instances of buckling to assist with returning to work.  -MP     STG 3 Progress Ongoing  -MP        Long Term Goals    LTG Date to Achieve 12/20/24  -MP     LTG 1 Pt will be independent with advance HEP to improve strength/ROM and decrease pain.  -MP     LTG 1 Progress Ongoing  -MP     LTG 2 Pt will improve B LE strength to at least 4+/5.  -MP     LTG 2 Progress Ongoing  -MP     LTG 3 Pt will be able to ascend/descend stairs in a reciprocal pattern with no increase pain.  -MP     LTG 3 Progress New  -MP     LTG 4 Pt will report </= 2/10 pain with ADLs to improve quality of life.  -MP     LTG 4 Progress Ongoing  -MP     LTG 5 Pt will improve KOS score to 70% to show improved quality of life.  -MP     LTG 5 Progress Ongoing  -MP     LTG 6 Patient will improve ability to sleep through the night without sleep disturbances related to back pain to improve overall sleep quality and quality of life.  -MP     LTG 6 Progress Ongoing  -MP               User Key  (r) = Recorded By, (t) = Taken By, (c) = Cosigned By      Initials Name Provider Type    Va Mahmood, PT Physical Therapist                    Therapy  Education  Education Details: Updated HEP [id number]  Given: Symptoms/condition management, Posture/body mechanics, HEP  Program: Reinforced, Progressed  How Provided: Verbal, Demonstration, Written  Provided to: Patient  Level of Understanding: Verbalized, Demonstrated              Time Calculation:   Start Time: 1415  Stop Time: 1455  Time Calculation (min): 40 min  Total Timed Code Minutes- PT: 40 minute(s)  Timed Charges  37128 - PT Therapeutic Exercise Minutes: 35  31135 - PT Manual Therapy Minutes: 5  Total Minutes  Timed Charges Total Minutes: 40   Total Minutes: 40  Therapy Charges for Today       Code Description Service Date Service Provider Modifiers Qty    34096986572 HC PT THER PROC EA 15 MIN 10/31/2024 Va Hdez, PT GP 3                      Va Hdez, PT  10/31/2024

## 2024-11-01 ENCOUNTER — OFFICE VISIT (OUTPATIENT)
Dept: ORTHOPEDIC SURGERY | Facility: CLINIC | Age: 44
End: 2024-11-01
Payer: COMMERCIAL

## 2024-11-01 VITALS — HEIGHT: 64 IN | BODY MASS INDEX: 47.99 KG/M2 | WEIGHT: 281.1 LBS | TEMPERATURE: 98 F

## 2024-11-01 DIAGNOSIS — M17.12 PRIMARY OSTEOARTHRITIS OF LEFT KNEE: Primary | ICD-10-CM

## 2024-11-01 DIAGNOSIS — M25.562 PAIN AND SWELLING OF LEFT KNEE: ICD-10-CM

## 2024-11-01 DIAGNOSIS — M25.462 PAIN AND SWELLING OF LEFT KNEE: ICD-10-CM

## 2024-11-01 PROCEDURE — 99213 OFFICE O/P EST LOW 20 MIN: CPT | Performed by: ORTHOPAEDIC SURGERY

## 2024-11-01 RX ORDER — LUMATEPERONE 10.5 MG/1
1 CAPSULE ORAL NIGHTLY
Qty: 30 CAPSULE | Refills: 0 | Status: SHIPPED | OUTPATIENT
Start: 2024-11-01

## 2024-11-01 NOTE — TELEPHONE ENCOUNTER
Rx Refill Note  Requested Prescriptions     Pending Prescriptions Disp Refills    Lumateperone Tosylate (Caplyta) 10.5 MG capsule 30 capsule 0     Sig: Take 1 capsule by mouth Every Night.      Last office visit with prescribing clinician: 9/6/2024   Last telemedicine visit with prescribing clinician: Visit date not found   Next office visit with prescribing clinician: 1/21/2025   Office Visit with Isha Vickers MD (09/06/2024)                       Would you like a call back once the refill request has been completed: [] Yes [] No    If the office needs to give you a call back, can they leave a voicemail: [] Yes [] No    Mely Williamson MA  11/01/24, 08:41 EDT

## 2024-11-04 NOTE — PROGRESS NOTES
Patient: Brittany Sheppard  YOB: 1980 44 y.o. female  Medical Record Number: [id number]    Chief Complaints:   Chief Complaint   Patient presents with    Left Knee - Follow-up, Pain       History of Present Illness:Brittany Sheppard is a 44 y.o. female who presents for follow-up of left knee pain.  Patient states has been doing some therapy it has been helping.  States her symptoms have improved.  Still some discomfort at times.    Allergies:   Allergies   Allergen Reactions    Cephalexin Hives    Penicillins Anaphylaxis and Hives    Prunus Persica Anaphylaxis    Cefaclor Hives    Morphine Hives and Itching    Adhesive Tape Other (See Comments)     Pulls skin off    Codeine Hives    Latex Hives and Rash     Hives and anaphlaxic         Medications:   Current Outpatient Medications   Medication Sig Dispense Refill    albuterol sulfate HFA (Ventolin HFA) 108 (90 Base) MCG/ACT inhaler Inhale 2 puffs Every 4 (Four) Hours As Needed for Wheezing or Shortness of Air. 18 g 3    calcium carbonate (TUMS) 500 MG chewable tablet Chew 1 tablet As Needed for Indigestion or Heartburn.      clarithromycin (BIAXIN) 500 MG tablet Take 1 tablet by mouth 2 (Two) Times a Day for 14 days. 28 tablet 0    gabapentin (Neurontin) 100 MG capsule Take 1 capsule by mouth 3 (Three) Times a Day. 90 capsule 2    hydrOXYzine pamoate (Vistaril) 50 MG capsule Take 1-2 capsules by mouth 3 (Three) Times a Day As Needed for Sleep/Anxiety 90 capsule 1    lansoprazole (Prevacid) 30 MG capsule Take 1 capsule by mouth 2 (Two) Times a Day for 14 days. 28 capsule 0    Melatonin 10 MG tablet Take 1 tablet by mouth Every Night.      metoprolol succinate XL (TOPROL-XL) 100 MG 24 hr tablet Take 1 tablet by mouth Daily. 90 tablet 1    metroNIDAZOLE (Flagyl) 500 MG tablet Take 1 tablet by mouth 2 (Two) Times a Day for 14 days. 28 tablet 0    sertraline (Zoloft) 100 MG tablet Take 2 tablets by mouth Daily. 60 tablet 1    valACYclovir (Valtrex) 500 MG  "tablet Take 1 tablet by mouth 2 (Two) Times a Day. 90 tablet 3    Lumateperone Tosylate (Caplyta) 10.5 MG capsule Take 1 capsule by mouth Every Night. 30 capsule 0     No current facility-administered medications for this visit.         The following portions of the patient's history were reviewed and updated as appropriate: allergies, current medications, past family history, past medical history, past social history, past surgical history and problem list.    Review of Systems:   A 14 point review of systems was performed. All systems negative except pertinent positives/negative listed in HPI above    Physical Exam:   Vitals:    11/01/24 0900   Temp: 98 °F (36.7 °C)   TempSrc: Temporal   Weight: 128 kg (281 lb 1.6 oz)   Height: 162.6 cm (64.02\")   PainSc:   4   PainLoc: Knee       General: A and O x 3, ASA, NAD    SCLERA:    Normal    DENTITION:   Normal    Left knee examined full painless range of motion no tenderness palpation stable to varus valgus stress.  Motor and sensory intact distally.  Ambulates with normal gait unassisted          Assessment/Plan:  Left knee arthritis, knee pain and swelling    Patient appears to be progressing well.  To therapy.  States her current job really does not have any part-time or restrictive duties and she is looking at a other position to allow her to sit down more.  I told her that would also be of benefit.  Her knee may flareup from time to time.  We discussed getting back to work in 2 weeks.  Do therapy until then.  She was okay with this plan.  She is also okay discussing this with her medical case manager which I spent time doing.  Follow-up as needed.  If any additional information or documentation as needed the patient and her  or told to let me know.      Total time spent 25 minutes  "

## 2024-11-06 ENCOUNTER — TELEPHONE (OUTPATIENT)
Dept: ORTHOPEDIC SURGERY | Facility: CLINIC | Age: 44
End: 2024-11-06

## 2024-11-06 NOTE — TELEPHONE ENCOUNTER
"Spoke with patient and let her know that I sent her a new work note via [employer]. She stated that she had scheduled an appointment with you for tomorrow 11/7 due to her having increased pain and she feels like her knee \"gives out\" in the shower. I advised her to keep that appointment just to make sure everything is ok.  "
Provider: ALPESH    Caller: BERENICE MONTEMAYOR    Relationship to Patient: SELF    Phone Number: [phone]    Reason for Call: PT STATES DR BETTS HAD HER RETURNING TO WORK AT FULL CAPACITY ON 11/18/24, BUT DOES NOT FEEL HER LEFT KNEE IS ANY BETTER. PT STATES SHE STILL HAS A LOT OF PAIN AND TROUBLE WALKING. PT WANTING TO KNOW IF A NOTE FOR SIT DOWN DUTY ONLY CAN BE WRITTEN FOR HER TO RETURN TO WORK. PLEASE ADVISE.  
I have reviewed and confirmed nurses' notes...

## 2024-11-07 ENCOUNTER — HOSPITAL ENCOUNTER (OUTPATIENT)
Dept: PHYSICAL THERAPY | Facility: HOSPITAL | Age: 44
Setting detail: THERAPIES SERIES
Discharge: HOME | End: 2024-11-07
Payer: COMMERCIAL

## 2024-11-07 DIAGNOSIS — G89.29 CHRONIC PAIN OF LEFT KNEE: Primary | ICD-10-CM

## 2024-11-07 DIAGNOSIS — R26.9 IMPAIRED GAIT: ICD-10-CM

## 2024-11-07 DIAGNOSIS — S83.242D ACUTE MEDIAL MENISCUS TEAR OF LEFT KNEE, SUBSEQUENT ENCOUNTER: ICD-10-CM

## 2024-11-07 DIAGNOSIS — M25.562 CHRONIC PAIN OF LEFT KNEE: Primary | ICD-10-CM

## 2024-11-07 PROCEDURE — 97110 THERAPEUTIC EXERCISES: CPT

## 2024-11-07 NOTE — THERAPY TREATMENT NOTE
Outpatient Physical Therapy Ortho Treatment Note  [facility]     Patient Name: Brittany Sheppard  : 1980  MRN: [id number]  Today's Date: 2024      Visit Date: 2024    Visit Dx:    ICD-10-CM ICD-9-CM   1. Chronic pain of left knee  M25.562 719.46    G89.29 338.29   2. Acute medial meniscus tear of left knee, subsequent encounter  S83.242D V58.89     836.0   3. Impaired gait  R26.9 781.2       Patient Active Problem List   Diagnosis    Abscess of axilla, left    Lumbago    Acute hepatitis C without hepatic coma    Generalized anxiety disorder    Asthma    Atopic contact dermatitis    Primary hypertension    Breast abscess of female    Cervical high risk HPV (human papillomavirus) test positive    Chondromalacia of right patella    Chronic pain of right knee    Gastroesophageal reflux disease    Hamstring tightness of right lower extremity    History of removal of laparoscopic gastric banding device    History of smoking    HSV-2 seropositive    Intractable chronic migraine without aura and without status migrainosus    LGSIL (low grade squamous intraepithelial dysplasia)    Patellofemoral syndrome of both knees    Pes anserinus bursitis of right knee    Primary osteoarthritis of both knees    Pruritic rash    Sepsis    Vitamin D deficiency    Osteoarthritis of multiple joints    Acute cystitis with hematuria    Generalized abdominal pain    KENDRA on CPAP    Bipolar 1 disorder, depressed, moderate    Alcohol use disorder, moderate, in early remission    Obesity, Class III, BMI 40-49.9 (morbid obesity)    Dietary counseling    Pre-op evaluation    Fatty liver    PCOS (polycystic ovarian syndrome)    Fibromyalgia    Left knee pain        Past Medical History:   Diagnosis Date    Acute recurrent frontal sinusitis 2022    Anemia     Anxiety     Arthritis     Asthma     Back pain     Bipolar 1 disorder     COPD (chronic obstructive pulmonary disease)     Depression     Dyspnea     Elevated liver  enzymes     GERD (gastroesophageal reflux disease)     H. 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    HPV in female 10/2015    Hypertension     IBS (irritable bowel syndrome)     Insomnia     LGSIL on Pap smear of cervix 01/2016    cx biopsy confoirmed LGSIL    Morbid obesity with body mass index of 50.0-59.9 in adult 06/24/2014    Obesity     PCOS (polycystic ovarian syndrome)     PCR positive for herpes simplex virus type 1 (HSV-1) DNA 2018    PONV (postoperative nausea and vomiting)     Preop cardiovascular exam 10/10/2014    Sleep apnea     HAS A C-PAP MACHINE    Steatosis of liver     liver biopsy; moderate; stage 3, grade 0    Vitamin D deficiency         Past Surgical History:   Procedure Laterality Date    APPENDECTOMY  07/19/2010    [facility]    BREAST SURGERY      I & D of abcess X3    CYST REMOVAL  09/2008    D & C HYSTEROSCOPY ENDOMETRIAL ABLATION  12/16/2010    menorrhagia; pathology benign    ENDOSCOPY N/A 10/29/2024    Procedure: ESOPHAGOGASTRODUODENOSCOPY WITH BIOPSY;  Surgeon: Melchor Duke Jr., MD;  Location: [facility] ENDOSCOPY;  Service: General;  Laterality: N/A;  PRE- DYSPEPSIA, H/O BAND REMOVAL  POST- GASTRITIS    LAPAROSCOPIC CHOLECYSTECTOMY      LAPAROSCOPIC GASTRIC BANDING  03/27/2017    Dr. Prieto Colon [facility]    LIVER BIOPSY      POLYPECTOMY      TOOTH EXTRACTION  2016    WISDOM TOOTH EXTRACTION  2012                        PT Assessment/Plan       Row Name 11/07/24 1100          PT Assessment    Assessment Comments Ms. Sheppard returns to PT for 3 visit (including initial evaluation) and reports her pain tolerance has improved but she feels overall no improvement since the start of PT. Patient reports she has contacted MD and will be see later today due to ongoing severe pain. Discussed with  patient therapy expectations based on pain levels, soft tissue injury and overall limited response to conservative measures. She did tolerance addition of supine SLR + QS and SL clam shells. No changes made to HEP to assess patient response and results of MD visit. Brittany Sheppard remains a candidate for skilled PT.  -JS        PT Plan    PT Plan Comments how did MD appt go? more appts? consider TKE  -JS               User Key  (r) = Recorded By, (t) = Taken By, (c) = Cosigned By      Initials Name Provider Type    Saskia Liang, PT Physical Therapist                       OP Exercises       Row Name 11/07/24 1100             Subjective    Subjective Comments I am still having a lot of pain. My pain is overall more tolerable but not much better. I am going to see the MD later today  -JS         Subjective Pain    Able to rate subjective pain? yes  -JS      Pre-Treatment Pain Level 5  -JS      Post-Treatment Pain Level 5  -JS         Total Minutes    61652 - PT Therapeutic Exercise Minutes 38  -JS         Exercise 1    Exercise Name 1 nustep  -JS      Cueing 1 Verbal  -JS      Time 1 5 min  -JS         Exercise 2    Exercise Name 2 supine QS  -JS      Cueing 2 Verbal;Demo  -JS      Reps 2 15  -JS      Time 2 5s  -JS         Exercise 3    Exercise Name 3 supine SLR + QS  -JS      Cueing 3 Verbal;Demo  -JS      Sets 3 2  -JS      Reps 3 10e  -JS         Exercise 4    Exercise Name 4 seated HS str  -JS      Cueing 4 Verbal;Demo  -JS      Sets 4 3ea  -JS      Reps 4 20s  -JS         Exercise 5    Exercise Name 5 SAQ  -JS      Cueing 5 Verbal  -JS      Sets 5 2  -JS      Reps 5 10ea  -JS         Exercise 6    Exercise Name 6 H/L hip add  -JS      Cueing 6 Verbal;Demo  -JS      Sets 6 2  -JS      Reps 6 10  -JS      Time 6 5s  -JS         Exercise 7    Exercise Name 7 LAQ  -JS      Cueing 7 Verbal;Demo  -JS      Sets 7 2  -JS      Reps 7 10e  -JS         Exercise 8    Exercise Name 8 heel slides  -JS      Cueing 8  Verbal;Demo  -JS      Reps 8 10  -JS      Time 8 5s  -JS         Exercise 9    Exercise Name 9 SL clam shells  -JS      Cueing 9 Verbal;Tactile  -JS      Sets 9 2  -JS      Reps 9 10e  -JS                User Key  (r) = Recorded By, (t) = Taken By, (c) = Cosigned By      Initials Name Provider Type    Saskia Liang, PT Physical Therapist                                  PT OP Goals       Row Name 11/07/24 1100          PT Short Term Goals    STG Date to Achieve 11/20/24  -JS     STG 1 Pt will be independent with initial HEP to improve strength/ROM and decrease pain.  -JS     STG 1 Progress Ongoing  -JS     STG 2 Pt will improve L knee AROM to at least 0-120 deg to improve ability to navigate stairs.  -JS     STG 2 Progress Ongoing  -JS     STG 3 Pt will improve walking tolerance to >20 minutes with no instances of buckling to assist with returning to work.  -JS     STG 3 Progress Ongoing  -JS        Long Term Goals    LTG Date to Achieve 12/20/24  -JS     LTG 1 Pt will be independent with advance HEP to improve strength/ROM and decrease pain.  -JS     LTG 1 Progress Ongoing  -JS     LTG 2 Pt will improve B LE strength to at least 4+/5.  -JS     LTG 2 Progress Ongoing  -JS     LTG 3 Pt will be able to ascend/descend stairs in a reciprocal pattern with no increase pain.  -JS     LTG 3 Progress New  -JS     LTG 4 Pt will report </= 2/10 pain with ADLs to improve quality of life.  -JS     LTG 4 Progress Ongoing  -JS     LTG 5 Pt will improve KOS score to 70% to show improved quality of life.  -JS     LTG 5 Progress Ongoing  -JS     LTG 6 Patient will improve ability to sleep through the night without sleep disturbances related to back pain to improve overall sleep quality and quality of life.  -JS     LTG 6 Progress Ongoing  -JS               User Key  (r) = Recorded By, (t) = Taken By, (c) = Cosigned By      Initials Name Provider Type    Saskia Liang, PT Physical Therapist                    Therapy  Education  Education Details: therapy expectations and prognosis based on pain, response to conservative treatment and injury  Given: Symptoms/condition management, Pain management  Program: Reinforced  How Provided: Verbal  Provided to: Patient  Level of Understanding: Verbalized              Time Calculation:   Start Time: 1102  Stop Time: 1140  Time Calculation (min): 38 min  Timed Charges  26642 - PT Therapeutic Exercise Minutes: 38  Total Minutes  Timed Charges Total Minutes: 38   Total Minutes: 38  Therapy Charges for Today       Code Description Service Date Service Provider Modifiers Qty    42427999095  PT THER PROC EA 15 MIN 11/7/2024 Saskia Krishnan, PT GP 3                      Saskia Krishnan, PT  11/7/2024

## 2024-11-12 ENCOUNTER — HOSPITAL ENCOUNTER (OUTPATIENT)
Dept: PHYSICAL THERAPY | Facility: HOSPITAL | Age: 44
Setting detail: THERAPIES SERIES
Discharge: HOME | End: 2024-11-12
Payer: COMMERCIAL

## 2024-11-12 DIAGNOSIS — S83.242D ACUTE MEDIAL MENISCUS TEAR OF LEFT KNEE, SUBSEQUENT ENCOUNTER: ICD-10-CM

## 2024-11-12 DIAGNOSIS — M25.562 CHRONIC PAIN OF LEFT KNEE: Primary | ICD-10-CM

## 2024-11-12 DIAGNOSIS — R26.9 IMPAIRED GAIT: ICD-10-CM

## 2024-11-12 DIAGNOSIS — G89.29 CHRONIC PAIN OF LEFT KNEE: Primary | ICD-10-CM

## 2024-11-12 PROCEDURE — 97110 THERAPEUTIC EXERCISES: CPT

## 2024-11-12 NOTE — THERAPY TREATMENT NOTE
Outpatient Physical Therapy Ortho Treatment Note  [facility]     Patient Name: Brittany Sheppard  : 1980  MRN: [id number]  Today's Date: 2024      Visit Date: 2024    Visit Dx:    ICD-10-CM ICD-9-CM   1. Chronic pain of left knee  M25.562 719.46    G89.29 338.29   2. Acute medial meniscus tear of left knee, subsequent encounter  S83.242D V58.89     836.0   3. Impaired gait  R26.9 781.2       Patient Active Problem List   Diagnosis    Abscess of axilla, left    Lumbago    Acute hepatitis C without hepatic coma    Generalized anxiety disorder    Asthma    Atopic contact dermatitis    Primary hypertension    Breast abscess of female    Cervical high risk HPV (human papillomavirus) test positive    Chondromalacia of right patella    Chronic pain of right knee    Gastroesophageal reflux disease    Hamstring tightness of right lower extremity    History of removal of laparoscopic gastric banding device    History of smoking    HSV-2 seropositive    Intractable chronic migraine without aura and without status migrainosus    LGSIL (low grade squamous intraepithelial dysplasia)    Patellofemoral syndrome of both knees    Pes anserinus bursitis of right knee    Primary osteoarthritis of both knees    Pruritic rash    Sepsis    Vitamin D deficiency    Osteoarthritis of multiple joints    Acute cystitis with hematuria    Generalized abdominal pain    KENDRA on CPAP    Bipolar 1 disorder, depressed, moderate    Alcohol use disorder, moderate, in early remission    Obesity, Class III, BMI 40-49.9 (morbid obesity)    Dietary counseling    Pre-op evaluation    Fatty liver    PCOS (polycystic ovarian syndrome)    Fibromyalgia    Left knee pain        Past Medical History:   Diagnosis Date    Acute recurrent frontal sinusitis 2022    Anemia     Anxiety     Arthritis     Asthma     Back pain     Bipolar 1 disorder     COPD (chronic obstructive pulmonary disease)     Depression     Dyspnea     Elevated liver  enzymes     GERD (gastroesophageal reflux disease)     H. 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    HPV in female 10/2015    Hypertension     IBS (irritable bowel syndrome)     Insomnia     LGSIL on Pap smear of cervix 01/2016    cx biopsy confoirmed LGSIL    Morbid obesity with body mass index of 50.0-59.9 in adult 06/24/2014    Obesity     PCOS (polycystic ovarian syndrome)     PCR positive for herpes simplex virus type 1 (HSV-1) DNA 2018    PONV (postoperative nausea and vomiting)     Preop cardiovascular exam 10/10/2014    Sleep apnea     HAS A C-PAP MACHINE    Steatosis of liver     liver biopsy; moderate; stage 3, grade 0    Vitamin D deficiency         Past Surgical History:   Procedure Laterality Date    APPENDECTOMY  07/19/2010    [facility]    BREAST SURGERY      I & D of abcess X3    CYST REMOVAL  09/2008    D & C HYSTEROSCOPY ENDOMETRIAL ABLATION  12/16/2010    menorrhagia; pathology benign    ENDOSCOPY N/A 10/29/2024    Procedure: ESOPHAGOGASTRODUODENOSCOPY WITH BIOPSY;  Surgeon: Melchor Duke Jr., MD;  Location: [facility] ENDOSCOPY;  Service: General;  Laterality: N/A;  PRE- DYSPEPSIA, H/O BAND REMOVAL  POST- GASTRITIS    LAPAROSCOPIC CHOLECYSTECTOMY      LAPAROSCOPIC GASTRIC BANDING  03/27/2017    Dr. Prieto Colon APL    LIVER BIOPSY      POLYPECTOMY      TOOTH EXTRACTION  2016    WISDOM TOOTH EXTRACTION  2012                        PT Assessment/Plan       Row Name 11/12/24 1700          PT Assessment    Assessment Comments Ms. Sheppard returns to PT with no new changes in symptoms. She now returns to MD on 11/14 to discuss ongoing POC and possible alternative conservative tx options. Progressed today's visit with adding knee flexion stretch, LAQ with hip adduction, TB with clamshells, and bridges to  further address mobility and strength deficits of LE. Fair tolerance to additions, with pt ambulating out of clinic with moderate limp at end of session. Discussed ice for pain management. Pt remains appropriate for skilled PT at this time.  -DR        PT Plan    PT Plan Comments consider TKE at wall, side steps, small step ups?  -DR               User Key  (r) = Recorded By, (t) = Taken By, (c) = Cosigned By      Initials Name Provider Type    Roger Boyle, PT Physical Therapist                       OP Exercises       Row Name 11/12/24 1600             Subjective    Subjective Comments PT has helped some.  -DR         Total Minutes    64016 - PT Therapeutic Exercise Minutes 39  -DR         Exercise 1    Exercise Name 1 nustep  -DR      Cueing 1 Verbal  -DR      Time 1 5 min  -DR         Exercise 2    Exercise Name 2 supine QS  -DR      Cueing 2 Verbal;Demo  -DR      Reps 2 15  -DR      Time 2 5s  -DR         Exercise 3    Exercise Name 3 supine SLR + QS  -DR      Cueing 3 Verbal;Demo  -DR      Sets 3 2  -DR      Reps 3 10e  -DR         Exercise 4    Exercise Name 4 seated HS str  -DR      Cueing 4 Verbal;Demo  -DR      Sets 4 3ea  -DR      Reps 4 20s  -DR         Exercise 6    Exercise Name 6 LAQ + hip add  -DR      Cueing 6 Verbal;Demo  -DR      Sets 6 2  -DR      Reps 6 10  -DR      Time 6 5s  -DR         Exercise 8    Exercise Name 8 knee flexion stretch  -DR      Cueing 8 Verbal;Demo  -DR      Reps 8 3  -DR      Time 8 20s  -DR         Exercise 9    Exercise Name 9 SL clam shells  -DR      Cueing 9 Verbal;Tactile  -DR      Sets 9 2  -DR      Reps 9 10e  -DR      Time 9 YTB  -DR         Exercise 10    Exercise Name 10 supine bridges  -DR      Cueing 10 Verbal;Demo  -DR      Sets 10 2  -DR      Reps 10 10  -DR                User Key  (r) = Recorded By, (t) = Taken By, (c) = Cosigned By      Initials Name Provider Type    Roger Boyle, PT Physical Therapist                                  PT OP  Goals       Row Name 11/12/24 1600          PT Short Term Goals    STG Date to Achieve 11/20/24  -     STG 1 Pt will be independent with initial HEP to improve strength/ROM and decrease pain.  -     STG 1 Progress Ongoing  -     STG 2 Pt will improve L knee AROM to at least 0-120 deg to improve ability to navigate stairs.  -     STG 2 Progress Ongoing  -DR     STG 3 Pt will improve walking tolerance to >20 minutes with no instances of buckling to assist with returning to work.  -     STG 3 Progress Ongoing  -DR        Long Term Goals    LTG Date to Achieve 12/20/24  -     LTG 1 Pt will be independent with advance HEP to improve strength/ROM and decrease pain.  -     LTG 1 Progress Ongoing  -     LTG 2 Pt will improve B LE strength to at least 4+/5.  -     LTG 2 Progress Ongoing  -     LTG 3 Pt will be able to ascend/descend stairs in a reciprocal pattern with no increase pain.  -     LTG 3 Progress New  -     LTG 4 Pt will report </= 2/10 pain with ADLs to improve quality of life.  -     LTG 4 Progress Ongoing  -     LTG 5 Pt will improve KOS score to 70% to show improved quality of life.  -     LTG 5 Progress Ongoing  -DR     LTG 6 Patient will improve ability to sleep through the night without sleep disturbances related to back pain to improve overall sleep quality and quality of life.  -     LTG 6 Progress Ongoing  -DR               User Key  (r) = Recorded By, (t) = Taken By, (c) = Cosigned By      Initials Name Provider Type    Roger Boyle, PT Physical Therapist                    Therapy Education  Given: Symptoms/condition management, Pain management  Program: Reinforced  How Provided: Verbal  Provided to: Patient  Level of Understanding: Verbalized              Time Calculation:   Start Time: 1627  Stop Time: 1706  Time Calculation (min): 39 min  Timed Charges  39334 - PT Therapeutic Exercise Minutes: 39  Total Minutes  Timed Charges Total Minutes: 39   Total Minutes:  39  Therapy Charges for Today       Code Description Service Date Service Provider Modifiers Qty    07136619476  PT THER PROC EA 15 MIN 11/12/2024 Roger Hilliard, PT GP 3                      Roger Hilliard, PT  11/12/2024

## 2024-11-14 ENCOUNTER — OFFICE VISIT (OUTPATIENT)
Dept: ORTHOPEDIC SURGERY | Facility: CLINIC | Age: 44
End: 2024-11-14
Payer: COMMERCIAL

## 2024-11-14 ENCOUNTER — HOSPITAL ENCOUNTER (OUTPATIENT)
Dept: PHYSICAL THERAPY | Facility: HOSPITAL | Age: 44
Setting detail: THERAPIES SERIES
Discharge: HOME | End: 2024-11-14
Payer: COMMERCIAL

## 2024-11-14 VITALS — TEMPERATURE: 97.5 F | HEIGHT: 64 IN | BODY MASS INDEX: 47.97 KG/M2 | WEIGHT: 281 LBS

## 2024-11-14 DIAGNOSIS — G89.29 CHRONIC PAIN OF LEFT KNEE: Primary | ICD-10-CM

## 2024-11-14 DIAGNOSIS — M76.32 ILIOTIBIAL BAND TENDONITIS OF LEFT SIDE: ICD-10-CM

## 2024-11-14 DIAGNOSIS — R26.9 IMPAIRED GAIT: ICD-10-CM

## 2024-11-14 DIAGNOSIS — S83.242D ACUTE MEDIAL MENISCUS TEAR OF LEFT KNEE, SUBSEQUENT ENCOUNTER: ICD-10-CM

## 2024-11-14 DIAGNOSIS — M25.562 CHRONIC PAIN OF LEFT KNEE: Primary | ICD-10-CM

## 2024-11-14 DIAGNOSIS — M17.12 PRIMARY OSTEOARTHRITIS OF LEFT KNEE: Primary | ICD-10-CM

## 2024-11-14 DIAGNOSIS — S83.8X2A INJURY OF MENISCUS OF LEFT KNEE, INITIAL ENCOUNTER: ICD-10-CM

## 2024-11-14 PROCEDURE — 97110 THERAPEUTIC EXERCISES: CPT

## 2024-11-14 NOTE — PROGRESS NOTES
Patient: Brittany Sheppard  YOB: 1980 44 y.o. female  Medical Record Number: [id number]    Chief Complaints:   Chief Complaint   Patient presents with    Left Knee - Follow-up       History of Present Illness:Brittany Sheppard is a 44 y.o. female who presents for follow-up of left knee pain.  Patient is been seen previously did have an MRI showed several degenerative changes about the knee more medially based as previously discussed.  Patient has been progressing well with therapy currently not in that and would be delayed getting back over the next several weeks if she goes to the current location.  Overall she feels that she is benefiting and when her knee is bothering her is little more stiffness that she is got a get moving and some lateral discomfort.  Medial knee pain has improved.    Allergies:   Allergies   Allergen Reactions    Cephalexin Hives    Penicillins Anaphylaxis and Hives    Prunus Persica Anaphylaxis    Cefaclor Hives    Morphine Hives and Itching    Adhesive Tape Other (See Comments)     Pulls skin off    Codeine Hives    Latex Hives and Rash     Hives and anaphlaxic         Medications:   Current Outpatient Medications   Medication Sig Dispense Refill    albuterol sulfate HFA (Ventolin HFA) 108 (90 Base) MCG/ACT inhaler Inhale 2 puffs Every 4 (Four) Hours As Needed for Wheezing or Shortness of Air. 18 g 3    calcium carbonate (TUMS) 500 MG chewable tablet Chew 1 tablet As Needed for Indigestion or Heartburn.      clarithromycin (BIAXIN) 500 MG tablet Take 1 tablet by mouth 2 (Two) Times a Day for 14 days. 28 tablet 0    gabapentin (Neurontin) 100 MG capsule Take 1 capsule by mouth 3 (Three) Times a Day. 90 capsule 2    hydroxychloroquine (PLAQUENIL) 200 MG tablet Take 1 tablet by mouth Every 12 (Twelve) Hours. 60 tablet 3    hydrOXYzine pamoate (Vistaril) 50 MG capsule Take 1-2 capsules by mouth 3 (Three) Times a Day As Needed for Sleep/Anxiety 90 capsule 1    lansoprazole (Prevacid)  "30 MG capsule Take 1 capsule by mouth 2 (Two) Times a Day for 14 days. 28 capsule 0    Lumateperone Tosylate (Caplyta) 10.5 MG capsule Take 1 capsule by mouth Every Night. 30 capsule 0    Melatonin 10 MG tablet Take 1 tablet by mouth Every Night.      metoprolol succinate XL (TOPROL-XL) 100 MG 24 hr tablet Take 1 tablet by mouth Daily. 90 tablet 1    metroNIDAZOLE (Flagyl) 500 MG tablet Take 1 tablet by mouth 2 (Two) Times a Day for 14 days. 28 tablet 0    sertraline (Zoloft) 100 MG tablet Take 2 tablets by mouth Daily. 60 tablet 1    valACYclovir (Valtrex) 500 MG tablet Take 1 tablet by mouth 2 (Two) Times a Day. 90 tablet 3     No current facility-administered medications for this visit.         The following portions of the patient's history were reviewed and updated as appropriate: allergies, current medications, past family history, past medical history, past social history, past surgical history and problem list.    Review of Systems:   A 14 point review of systems was performed. All systems negative except pertinent positives/negative listed in HPI above    Physical Exam:   Vitals:    11/14/24 1502   Temp: 97.5 °F (36.4 °C)   TempSrc: Temporal   Weight: 127 kg (281 lb)   Height: 162.6 cm (64.02\")   PainSc:   3   PainLoc: Knee       General: A and O x 3, ASA, NAD    SCLERA:    Normal    DENTITION:   Normal  Left knee examined minimal tenderness over the lateral part of the knee particular the IT band.  This was improved with range of motion with changing the fulcrum of the IT band.  Gentle motion overall tolerated.  Motor and sensory intact distally.        Assessment/Plan:  Left knee arthritis, IT band tendinitis, injury of meniscus    Discussed the findings with the patient.  I believe she is benefiting from some therapy like to continue that and also help work on the IT band which is more of a new symptom.  We will also give her a IT band strap to be worn over the next 1 to 2 weeks and can wean off that " continue anti-inflammatory medication if able to take and tolerate.  Ice as needed particular after therapy.  Regards to working we will make some restrictions to allow sitdown duty intermittently as needed.  Will plan to see the patient back in January.  There is any changes there interim she will let us know.  Also in regards to the MRI findings there was mention of repeat MRI in 3 to 6 months from the previous time due to a benign appearing ganglion/lesion just to follow-up on.  In January we will discuss ordering the repeat MRI for evaluation.  Patient is understanding agreeable this plan.

## 2024-11-14 NOTE — THERAPY TREATMENT NOTE
Outpatient Physical Therapy Ortho Treatment Note  [facility]     Patient Name: Brittany Sheppard  : 1980  MRN: [id number]  Today's Date: 2024      Visit Date: 2024    Visit Dx:    ICD-10-CM ICD-9-CM   1. Chronic pain of left knee  M25.562 719.46    G89.29 338.29   2. Acute medial meniscus tear of left knee, subsequent encounter  S83.242D V58.89     836.0   3. Impaired gait  R26.9 781.2       Patient Active Problem List   Diagnosis    Abscess of axilla, left    Lumbago    Acute hepatitis C without hepatic coma    Generalized anxiety disorder    Asthma    Atopic contact dermatitis    Primary hypertension    Breast abscess of female    Cervical high risk HPV (human papillomavirus) test positive    Chondromalacia of right patella    Chronic pain of right knee    Gastroesophageal reflux disease    Hamstring tightness of right lower extremity    History of removal of laparoscopic gastric banding device    History of smoking    HSV-2 seropositive    Intractable chronic migraine without aura and without status migrainosus    LGSIL (low grade squamous intraepithelial dysplasia)    Patellofemoral syndrome of both knees    Pes anserinus bursitis of right knee    Primary osteoarthritis of both knees    Pruritic rash    Sepsis    Vitamin D deficiency    Osteoarthritis of multiple joints    Acute cystitis with hematuria    Generalized abdominal pain    KENDRA on CPAP    Bipolar 1 disorder, depressed, moderate    Alcohol use disorder, moderate, in early remission    Obesity, Class III, BMI 40-49.9 (morbid obesity)    Dietary counseling    Pre-op evaluation    Fatty liver    PCOS (polycystic ovarian syndrome)    Fibromyalgia    Left knee pain        Past Medical History:   Diagnosis Date    Acute recurrent frontal sinusitis 2022    Anemia     Anxiety     Arthritis     Asthma     Back pain     Bipolar 1 disorder     COPD (chronic obstructive pulmonary disease)     Depression     Dyspnea     Elevated liver  enzymes     GERD (gastroesophageal reflux disease)     H. 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    HPV in female 10/2015    Hypertension     IBS (irritable bowel syndrome)     Insomnia     LGSIL on Pap smear of cervix 01/2016    cx biopsy confoirmed LGSIL    Morbid obesity with body mass index of 50.0-59.9 in adult 06/24/2014    Obesity     PCOS (polycystic ovarian syndrome)     PCR positive for herpes simplex virus type 1 (HSV-1) DNA 2018    PONV (postoperative nausea and vomiting)     Preop cardiovascular exam 10/10/2014    Sleep apnea     HAS A C-PAP MACHINE    Steatosis of liver     liver biopsy; moderate; stage 3, grade 0    Vitamin D deficiency         Past Surgical History:   Procedure Laterality Date    APPENDECTOMY  07/19/2010    [facility]    BREAST SURGERY      I & D of abcess X3    CYST REMOVAL  09/2008    D & C HYSTEROSCOPY ENDOMETRIAL ABLATION  12/16/2010    menorrhagia; pathology benign    ENDOSCOPY N/A 10/29/2024    Procedure: ESOPHAGOGASTRODUODENOSCOPY WITH BIOPSY;  Surgeon: Melchor Duke Jr., MD;  Location: [facility] ENDOSCOPY;  Service: General;  Laterality: N/A;  PRE- DYSPEPSIA, H/O BAND REMOVAL  POST- GASTRITIS    LAPAROSCOPIC CHOLECYSTECTOMY      LAPAROSCOPIC GASTRIC BANDING  03/27/2017    Dr. Prieto Colon [facility]PL    LIVER BIOPSY      POLYPECTOMY      TOOTH EXTRACTION  2016    WISDOM TOOTH EXTRACTION  2012                        PT Assessment/Plan       Row Name 11/14/24 1100          PT Assessment    Assessment Comments Ms. Sheppard returns to PT with no change in knee pain. Sees MD for follow-up after today's visit, which at that time, will decide further POC through PT. Resumed previously completed exercises, attempted to add side steps, however, pt had a tweek in her knee after lap 2. She  declined further exs due to sharp pain and increased discomfort. Will attempt to progress at next visit. Pt remains appropriate for skilled PT.  -DR        PT Plan    PT Plan Comments how was MD appt? update HEP. consider TKE at wall, small step ups?  -DR               User Key  (r) = Recorded By, (t) = Taken By, (c) = Cosigned By      Initials Name Provider Type    Roger Boyle, PT Physical Therapist                       OP Exercises       Row Name 11/14/24 1000             Subjective    Subjective Comments I felt fine after last time.  -DR         Total Minutes    69497 - PT Therapeutic Exercise Minutes 31  -DR         Exercise 1    Exercise Name 1 nustep  -DR      Cueing 1 Verbal  -DR      Time 1 5 min  -DR         Exercise 3    Exercise Name 3 supine SLR + QS  -DR      Cueing 3 Verbal;Demo  -DR      Sets 3 1  -DR      Reps 3 10e  -DR         Exercise 4    Exercise Name 4 seated HS str  -DR      Cueing 4 Verbal;Demo  -DR      Sets 4 3ea  -DR      Reps 4 20s  -DR         Exercise 6    Exercise Name 6 LAQ + hip add  -DR      Cueing 6 Verbal;Demo  -DR      Sets 6 2  -DR      Reps 6 10  -DR      Time 6 5s  -DR         Exercise 7    Exercise Name 7 side steps  -DR      Cueing 7 Verbal;Demo  -DR      Reps 7 3 laps  -DR      Time 7 YTB  -DR         Exercise 9    Exercise Name 9 SL clam shells  -DR      Cueing 9 Verbal;Tactile  -DR      Sets 9 2  -DR      Reps 9 10e  -DR      Time 9 YTB  -DR         Exercise 10    Exercise Name 10 supine bridges  -DR      Cueing 10 Verbal;Demo  -DR      Sets 10 2  -DR      Reps 10 10  -DR      Time 10 2-3s  -DR                User Key  (r) = Recorded By, (t) = Taken By, (c) = Cosigned By      Initials Name Provider Type    Roger Boyle, PT Physical Therapist                                  PT OP Goals       Row Name 11/14/24 1100          PT Short Term Goals    STG Date to Achieve 11/20/24  -DR     STG 1 Pt will be independent with initial HEP to improve strength/ROM and  decrease pain.  -     STG 1 Progress Ongoing  -     STG 2 Pt will improve L knee AROM to at least 0-120 deg to improve ability to navigate stairs.  -     STG 2 Progress Ongoing  -     STG 3 Pt will improve walking tolerance to >20 minutes with no instances of buckling to assist with returning to work.  -     STG 3 Progress Ongoing  -        Long Term Goals    LTG Date to Achieve 12/20/24  -     LTG 1 Pt will be independent with advance HEP to improve strength/ROM and decrease pain.  -     LTG 1 Progress Ongoing  -     LTG 2 Pt will improve B LE strength to at least 4+/5.  -     LTG 2 Progress Ongoing  -     LTG 3 Pt will be able to ascend/descend stairs in a reciprocal pattern with no increase pain.  -     LTG 3 Progress New  -     LTG 4 Pt will report </= 2/10 pain with ADLs to improve quality of life.  -     LTG 4 Progress Ongoing  -     LTG 5 Pt will improve KOS score to 70% to show improved quality of life.  -     LTG 5 Progress Ongoing  -     LTG 6 Patient will improve ability to sleep through the night without sleep disturbances related to back pain to improve overall sleep quality and quality of life.  -     LTG 6 Progress Ongoing  -               User Key  (r) = Recorded By, (t) = Taken By, (c) = Cosigned By      Initials Name Provider Type    Roger Boyle, PT Physical Therapist                    Therapy Education  Education Details: A&P of meniscus tears  Given: Symptoms/condition management  Program: Reinforced  How Provided: Verbal  Provided to: Patient  Level of Understanding: Verbalized              Time Calculation:   Start Time: 1059  Stop Time: 1130  Time Calculation (min): 31 min  Timed Charges  81172 - PT Therapeutic Exercise Minutes: 31  Total Minutes  Timed Charges Total Minutes: 31   Total Minutes: 31  Therapy Charges for Today       Code Description Service Date Service Provider Modifiers Qty    91554632254 HC PT THER PROC EA 15 MIN 11/14/2024  Roger Hilliard, PT GP 2                      Roger Hilliard, PT  11/14/2024

## 2024-11-20 NOTE — PROGRESS NOTES
"    I N T E R N A L  M E D I C I N E  Jane Buenrostro, APRN      ENCOUNTER DATE:  11/21/2024    Brittany Sheppard / 44 y.o. / female      CHIEF COMPLAINT     Annual Exam    May 2024 Ultrasound showed an abnormal left-sided submandibular lymph node, for which needle biopsy was recommended.  She was referred to ENT but did not follow up.  Today, she tells me the lymph node is smaller in size.  Non tender.      HTN: BP is elevated at today's appointment, 142/64.  Did not take last night's dose of Toprol  mg daily.  When taking her medication as prescribed, BP reportedly averages 120s/70s.  January 2024 Home sleep study with severe KENDRA.  Not currently wearing CPAP but plans to resume use.      Followed by psychiatry, Dr. Isha Vickers, for bipolar 1 disorder.  Now taking Caplyta 10.5 mg nightly, gabapentin 100 mg TID, sertraline 200 mg daily.    Mood is stable.     Saw rheumatology, Dr. Bella Bran on November 14, 2024 for diagnosis of seropositive rheumatoid arthritis.  Prescribed hydroxychloroquine 200 mg BID, recheck in 3-4 months.     Underwent EGD with Dr. Duke on October 29, 2024.  Planning to undergo gastric sleeve next spring 2025.  BMI 48: Weight is stable.     Followed by OBGYN, Dr. Stefany Wilburn.  Up to date with pap, mammogram.    Followed by ortho, Dr. Iglesias, for left knee OA.  Will next follow up in January 2025.    VITALS     Visit Vitals  /64   Pulse 80   Temp 97.8 °F (36.6 °C)   Resp 18   Ht 162.6 cm (64.02\")   Wt 128 kg (283 lb)   SpO2 99%   BMI 48.55 kg/m²       BP Readings from Last 3 Encounters:   11/21/24 142/64   10/29/24 108/71   10/24/24 140/89     Wt Readings from Last 3 Encounters:   11/21/24 128 kg (283 lb)   11/14/24 127 kg (281 lb)   11/01/24 128 kg (281 lb 1.6 oz)      Body mass index is 48.55 kg/m².       MEDICATIONS     Current Outpatient Medications on File Prior to Visit   Medication Sig Dispense Refill    albuterol sulfate HFA (Ventolin HFA) 108 (90 Base) MCG/ACT " inhaler Inhale 2 puffs Every 4 (Four) Hours As Needed for Wheezing or Shortness of Air. 18 g 3    calcium carbonate (TUMS) 500 MG chewable tablet Chew 1 tablet As Needed for Indigestion or Heartburn.      gabapentin (Neurontin) 100 MG capsule Take 1 capsule by mouth 3 (Three) Times a Day. 90 capsule 2    hydroxychloroquine (PLAQUENIL) 200 MG tablet Take 1 tablet by mouth Every 12 (Twelve) Hours. 60 tablet 3    hydrOXYzine pamoate (Vistaril) 50 MG capsule Take 1-2 capsules by mouth 3 (Three) Times a Day As Needed for Sleep/Anxiety 90 capsule 1    Lumateperone Tosylate (Caplyta) 10.5 MG capsule Take 1 capsule by mouth Every Night. 30 capsule 0    Melatonin 10 MG tablet Take 1 tablet by mouth Every Night.      metoprolol succinate XL (TOPROL-XL) 100 MG 24 hr tablet Take 1 tablet by mouth Daily. 90 tablet 1    sertraline (Zoloft) 100 MG tablet Take 2 tablets by mouth Daily. 60 tablet 1    valACYclovir (Valtrex) 500 MG tablet Take 1 tablet by mouth 2 (Two) Times a Day. 90 tablet 3     No current facility-administered medications on file prior to visit.         HISTORY OF PRESENT ILLNESS     Brittany presents for annual health maintenance visit.    General health: fair  Lifestyle:  Attempting to lose weight?: Yes   Diet: has poor dietary habits  Exercise: exercises rarely  Tobacco: Former smoker   Alcohol: 2 days/week and 4 drinks/occasion  Work: Full-time  Reproductive health:  Sexually active?: Yes   Sexual problems?: No problems  Concern for STD?: No    Sees Gynecologist?: Yes   Rosy/Postmenopausal?: No   Domestic abuse concerns: No   Depression Screenin/6/2024     8:46 AM   PHQ-2/PHQ-9 Depression Screening   Retired Little Interest or Pleasure in Doing Things 3-->nearly every day   Retired Feeling Down, Depressed or Hopeless 2-->more than half the days   Retired Trouble Falling or Staying Asleep, or Sleeping Too Much 2-->more than half the days   Retired Feeling Tired or Having Little Energy 2-->more than  half the days   Retired Poor Appetite or Overeating 0-->not at all   Retired Feeling Bad about Yourself - or that You are a Failure or Have Let Yourself or Your Family Down 2-->more than half the days   Retired Trouble Concentrating on Things, Such as Reading the Newspaper or Watching Television 3-->nearly every day   Retired Moving or Speaking So Slowly that Other People Could Have Noticed? Or the Opposite - Being So Fidgety 0-->not at all   Retired Thoughts that You Would be Better Off Dead or of Hurting Yourself in Some Way 0-->not at all   Retired PHQ-9: Brief Depression Severity Measure Score 14   Retired If You Checked Off Any Problems, How Difficult Have These Problems Made It For You to Do Your Work, Take Care of Things at Home, or Get Along with Other People? very difficult         PHQ-2: N/A (Has diagnosis of depression and is on treatment)   PHQ-9: 10-14 (Moderate Depression)    Patient Care Team:  Jane Buenrostro APRN as PCP - General (Family Medicine)  Lilia Joshua PA-C as Physician Assistant (Colon and Rectal Surgery)  Isha Vickers MD as Consulting Physician (Psychiatry)      ALLERGIES  Allergies   Allergen Reactions    Cephalexin Hives    Penicillins Anaphylaxis and Hives    Prunus Persica Anaphylaxis    Cefaclor Hives    Morphine Hives and Itching    Adhesive Tape Other (See Comments)     Pulls skin off    Codeine Hives    Latex Hives and Rash     Hives and anaphlaxic          PFSH:     The following portions of the patient's history were reviewed and updated as appropriate: Allergies / Current Medications / Past Medical History / Surgical History / Social History / Family History    PROBLEM LIST   Patient Active Problem List   Diagnosis    Abscess of axilla, left    Lumbago    Acute hepatitis C without hepatic coma    Generalized anxiety disorder    Asthma    Atopic contact dermatitis    Primary hypertension    Breast abscess of female    Cervical high risk HPV (human papillomavirus) test  positive    Chondromalacia of right patella    Chronic pain of right knee    Gastroesophageal reflux disease    Hamstring tightness of right lower extremity    History of removal of laparoscopic gastric banding device    History of smoking    HSV-2 seropositive    Intractable chronic migraine without aura and without status migrainosus    LGSIL (low grade squamous intraepithelial dysplasia)    Patellofemoral syndrome of both knees    Pes anserinus bursitis of right knee    Primary osteoarthritis of both knees    Pruritic rash    Sepsis    Vitamin D deficiency    Osteoarthritis of multiple joints    Acute cystitis with hematuria    Generalized abdominal pain    KENDRA on CPAP    Bipolar 1 disorder, depressed, moderate    Alcohol use disorder, moderate, in early remission    Obesity, Class III, BMI 40-49.9 (morbid obesity)    Dietary counseling    Pre-op evaluation    Fatty liver    PCOS (polycystic ovarian syndrome)    Fibromyalgia    Left knee pain       PAST MEDICAL HISTORY  Past Medical History:   Diagnosis Date    Acute recurrent frontal sinusitis 09/01/2022    Anemia     Anxiety     Arthritis     Asthma     Back pain     Bipolar 1 disorder     COPD (chronic obstructive pulmonary disease)     Depression     Dyspnea     Elevated liver enzymes     GERD (gastroesophageal reflux disease)     H. 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    HPV in female 10/2015    Hypertension     IBS (irritable bowel syndrome)     Insomnia     LGSIL on Pap smear of cervix 01/2016    cx biopsy confoirmed LGSIL    Morbid obesity with body mass index of 50.0-59.9 in adult 06/24/2014    Obesity     PCOS (polycystic ovarian syndrome)     PCR positive for herpes simplex virus type 1 (HSV-1) DNA 2018    PONV (postoperative nausea and vomiting)      Preop cardiovascular exam 10/10/2014    Sleep apnea     HAS A C-PAP MACHINE    Steatosis of liver     liver biopsy; moderate; stage 3, grade 0    Vitamin D deficiency        SURGICAL HISTORY  Past Surgical History:   Procedure Laterality Date    APPENDECTOMY  2010    [facility]    BREAST SURGERY      I & D of abcess X3    CYST REMOVAL  2008    D & C HYSTEROSCOPY ENDOMETRIAL ABLATION  2010    menorrhagia; pathology benign    ENDOSCOPY N/A 10/29/2024    Procedure: ESOPHAGOGASTRODUODENOSCOPY WITH BIOPSY;  Surgeon: Melchor Duke Jr., MD;  Location: [facility] ENDOSCOPY;  Service: General;  Laterality: N/A;  PRE- DYSPEPSIA, H/O BAND REMOVAL  POST- GASTRITIS    LAPAROSCOPIC CHOLECYSTECTOMY      LAPAROSCOPIC GASTRIC BANDING  2017    Dr. Prieto Colon APL    LIVER BIOPSY      POLYPECTOMY      TOOTH EXTRACTION  2016    WISDOM TOOTH EXTRACTION         SOCIAL HISTORY  Social History     Socioeconomic History    Marital status:     Number of children: 0   Tobacco Use    Smoking status: Former     Current packs/day: 0.00     Types: Cigarettes     Quit date:      Years since quittin.9     Passive exposure: Never    Smokeless tobacco: Never   Vaping Use    Vaping status: Never Used   Substance and Sexual Activity    Alcohol use: Yes     Comment: off and on- won't drink for weeks and then will have a week where she drinks several days    Drug use: Never    Sexual activity: Defer     Partners: Male, Female       FAMILY HISTORY  Family History   Problem Relation Age of Onset    Hypertension Mother     Obesity Mother     Diabetes Mother     Sleep apnea Mother     Bipolar disorder Father     Diabetes Father     Hypertension Father     Migraines Father     Alcohol abuse Father     Stroke Maternal Grandmother     Breast cancer Maternal Grandmother 72    Diabetes Maternal Grandmother     Hypertension Maternal Grandmother     Stroke Maternal Grandfather     Cancer Maternal Grandfather     Stroke  Paternal Grandmother     Breast cancer Paternal Grandmother     Hypertension Paternal Grandmother     Pancreatic cancer Paternal Grandmother     Schizophrenia Nephew     Bipolar disorder Nephew     Hardik Hyperthermia Neg Hx        IMMUNIZATION HISTORY  Immunization History   Administered Date(s) Administered    COVID-19 (PFIZER) Purple Cap Monovalent 09/10/2021, 10/13/2021    Fluzone  >6mos 11/21/2024    Fluzone (or Fluarix & Flulaval for VFC) >6mos 11/29/2018, 10/27/2021, 11/14/2023    Hepatitis A 08/11/2018, 09/09/2019    INFLUENZA SPLIT TRI 10/01/2020    Tdap 08/01/2014         REVIEW OF SYSTEMS     Review of Systems   Constitutional:  Negative for chills, fever and unexpected weight change.   Respiratory:  Negative for cough, chest tightness and shortness of breath.    Cardiovascular:  Negative for chest pain, palpitations and leg swelling.   Neurological:  Negative for dizziness, weakness, light-headedness and headaches.   Psychiatric/Behavioral:  Positive for dysphoric mood (Moderate, chronic). Negative for self-injury and suicidal ideas. The patient is nervous/anxious.          PHYSICAL EXAMINATION     Physical Exam  Vitals reviewed.   Constitutional:       General: She is not in acute distress.     Appearance: Normal appearance. She is not ill-appearing, toxic-appearing or diaphoretic.   HENT:      Head: Normocephalic and atraumatic.      Right Ear: Tympanic membrane, ear canal and external ear normal. There is no impacted cerumen.      Left Ear: Tympanic membrane, ear canal and external ear normal. There is no impacted cerumen.      Nose: Nose normal. No congestion or rhinorrhea.      Mouth/Throat:      Mouth: Mucous membranes are moist.      Pharynx: Oropharynx is clear. No oropharyngeal exudate or posterior oropharyngeal erythema.   Eyes:      Extraocular Movements: Extraocular movements intact.      Conjunctiva/sclera: Conjunctivae normal.      Pupils: Pupils are equal, round, and reactive to light.    Cardiovascular:      Rate and Rhythm: Normal rate and regular rhythm.      Heart sounds: Normal heart sounds.   Pulmonary:      Effort: Pulmonary effort is normal. No respiratory distress.      Breath sounds: Normal breath sounds.   Abdominal:      General: Bowel sounds are normal.      Palpations: Abdomen is soft.      Tenderness: There is no abdominal tenderness.   Musculoskeletal:         General: Normal range of motion.      Cervical back: Normal range of motion and neck supple.      Right lower leg: No edema.      Left lower leg: No edema.   Lymphadenopathy:      Cervical: No cervical adenopathy.   Skin:     General: Skin is warm and dry.   Neurological:      General: No focal deficit present.      Mental Status: She is alert and oriented to person, place, and time. Mental status is at baseline.   Psychiatric:         Mood and Affect: Mood normal.         Behavior: Behavior normal.         Thought Content: Thought content normal.         Judgment: Judgment normal.         REVIEWED DATA      Labs:    Lab Results   Component Value Date     09/27/2024    K 4.1 09/27/2024    CALCIUM 8.9 09/27/2024    AST 25 09/27/2024    ALT 19 09/27/2024    BUN 8 09/27/2024    CREATININE 0.77 09/27/2024    CREATININE 0.91 08/26/2024    CREATININE 0.84 02/15/2024       Lab Results   Component Value Date    GLUCOSE 99 09/27/2024    HGBA1C 4.90 02/15/2024    HGBA1C 5.40 08/23/2023    HGBA1C 5.30 09/14/2022    TSH 1.200 02/15/2024    FREET4 1.03 02/15/2024       Lab Results   Component Value Date    LDL 94 08/23/2023    HDL 59 08/23/2023    TRIG 60 08/23/2023    CHOLHDLRATIO 2.80 08/23/2023       Lab Results   Component Value Date    DFEY67YJ 20.4 (L) 10/12/2023        Lab Results   Component Value Date    WBC 8.00 09/27/2024    HGB 13.1 09/27/2024    MCV 99.7 (H) 09/27/2024     09/27/2024       Lab Results   Component Value Date    PROTEIN Negative 08/23/2023    GLUCOSEU Negative 08/23/2023    BLOODU Negative  08/23/2023    NITRITEU Negative 08/23/2023    LEUKOCYTESUR Negative 08/23/2023          Lab Results   Component Value Date    HEPCVIRUSABY Reactive (A) 10/12/2023       Imaging:        Medical Tests:        ASSESSMENT & PLAN     ANNUAL WELLNESS EXAM / PHYSICAL     Diagnoses and all orders for this visit:    1. Annual physical exam (Primary)  -     Hemoglobin A1c  -     Lipid Panel With / Chol / HDL Ratio    2. Primary hypertension  Overview:  Metoprolol succinate  mg once daily.    Orders:  -     CBC & Differential  -     Comprehensive Metabolic Panel  -     TSH+Free T4  -     Urinalysis With Microscopic If Indicated (No Culture) - Urine, Clean Catch    3. Swelling of lymph nodes  -     Ambulatory Referral to ENT (Otolaryngology)    4. History of trichomoniasis  -     Chlamydia trachomatis, Neisseria gonorrhoeae, Trichomonas vaginalis, PCR - Urine, Urine, Clean Catch    5. Needs flu shot  -     Fluzone >6mos ([id number])         Summary/Discussion:     Monitor BP at home to ensure it is averaging < 130/80.  Encouraged patient to wear CPAP nightly.  She understands importance of scheduling with ENT for further investigation of enlarged submandibular lymph node with biopsy and states she will schedule.  Recently treated for trichomonas diagnosis by OBGYN - requesting repeat testing at today's visit to ensure cure.    Next Appointment with me: Visit date not found    Return for 6 month chronic care, 1 year annual physical.      HEALTHCARE MAINTENANCE ISSUES     Cancer Screening:  Colon: Initial/Next screening at age: 47  Repeat colon cancer screening: N/A at this time  Breast: Recommended monthly self exams; annual professional exam  Mammogram: every 1 year  Cervical: 1 year  Skin: Monthly self skin examination, annual exam by health professional  Lung: Does not meet criteria for lung cancer screening.   Other:    Screening Labs & Tests:  Lab results reviewed & discussed with the patient or test orders placed  today.  EKG:  CV Screening: Lipid panel  DEXA (65+ or postmenopausal with risk factors):   HEP C (If born [birth year]18[birth year], or risk factors): Previously had negative screen  Other:     Immunization/Vaccinations (to be given today unless deferred by patient)  Influenza: Patient had the flu shot this season  Hepatitis A: Verify immunization records  Hepatitis B: Verify immunization records  Tetanus/Pertussis: Up to date  Pneumovax: Recommended here or at pharmacy  Shingles: Not needed at this time  COVID: Does not plan to get the latest booster    Lifestyle Counseling:  Lifestyle Modifications: Attempt to lose weight, Improve dietary compliance, Begin progressive aerobic exercise program 3-5 days a week, Maintain low sodium diet (< 3 gm) for blood pressure, Make dinner the lightest meal of day, Decrease or avoid alcohol intake, Continue to abstain from smoking, and Reduce exposure to stress if possible  Safety Issues: Always wear seatbelt, Avoid texting while driving   Use sunscreen, regular skin examination  Recommended annual dental/vision examination.  Emotional/Stress/Sleep: Reviewed and  given when appropriate      Health Maintenance   Topic Date Due    MAMMOGRAM  Never done    COVID-19 Vaccine (3 - 2024-25 season) 11/23/2024 (Originally 9/1/2024)    Pneumococcal Vaccine 0-64 (1 of 2 - PCV) 11/21/2025 (Originally 7/20/1986)    TDAP/TD VACCINES (2 - Td or Tdap) 11/21/2025 (Originally 8/1/2024)    BMI FOLLOWUP  11/14/2025    ANNUAL PHYSICAL  11/21/2025    PAP SMEAR  10/17/2027    HEPATITIS C SCREENING  Completed    INFLUENZA VACCINE  Completed    Hepatitis B  Discontinued

## 2024-11-21 ENCOUNTER — OFFICE VISIT (OUTPATIENT)
Dept: INTERNAL MEDICINE | Age: 44
End: 2024-11-21
Payer: COMMERCIAL

## 2024-11-21 VITALS
SYSTOLIC BLOOD PRESSURE: 142 MMHG | HEIGHT: 64 IN | HEART RATE: 80 BPM | BODY MASS INDEX: 48.32 KG/M2 | TEMPERATURE: 97.8 F | OXYGEN SATURATION: 99 % | WEIGHT: 283 LBS | DIASTOLIC BLOOD PRESSURE: 64 MMHG | RESPIRATION RATE: 18 BRPM

## 2024-11-21 DIAGNOSIS — R59.9 SWELLING OF LYMPH NODES: ICD-10-CM

## 2024-11-21 DIAGNOSIS — Z86.19 HISTORY OF TRICHOMONIASIS: ICD-10-CM

## 2024-11-21 DIAGNOSIS — Z00.00 ANNUAL PHYSICAL EXAM: Primary | ICD-10-CM

## 2024-11-21 DIAGNOSIS — I10 PRIMARY HYPERTENSION: ICD-10-CM

## 2024-11-21 DIAGNOSIS — Z23 NEEDS FLU SHOT: ICD-10-CM

## 2024-11-21 NOTE — LETTER
[facility]  Vaccine Consent Form    Patient Name:  Brittany Sheppard  Patient :  1980        Screening Checklist  The following questions should be completed prior to vaccination. If you answer “yes” to any question, it does not necessarily mean you should not be vaccinated. It just means we may need to clarify or ask more questions. If a question is unclear, please ask your healthcare provider to explain it.    Yes No   Any fever or moderate to severe illness today (mild illness and/or antibiotic treatment are not contraindications)?     Do you have a history of a serious reaction to any previous vaccinations, such as anaphylaxis, encephalopathy within 7 days, Guillain-Mesa syndrome within 6 weeks, seizure?     Have you received any live vaccine(s) (e.g MMR, YARELI) or any other vaccines in the last month (to ensure duplicate doses aren't given)?     Do you have an anaphylactic allergy to latex (DTaP, DTaP-IPV, Hep A, Hep B, MenB, RV, Td, Tdap), baker’s yeast (Hep B, HPV), polysorbates (RSV, nirsevimab, PCV 20, Rotavirrus, Tdap, Shingrix), or gelatin (YARELI, MMR)?     Do you have an anaphylactic allergy to neomycin (Rabies, YARELI, MMR, IPV, Hep A), polymyxin B (IPV), or streptomycin (IPV)?      Any cancer, leukemia, AIDS, or other immune system disorder? (YARELI, MMR, RV)     Do you have a parent, brother, or sister with an immune system problem (if immune competence of vaccine recipient clinically verified, can proceed)? (MMR, YARELI)     Any recent steroid treatments for >2 weeks, chemotherapy, or radiation treatment? (YARELI, MMR)     Have you received antibody-containing blood transfusions or IVIG in the past 11 months (recommended interval is dependent on product)? (MMR, YARELI)     Have you taken antiviral drugs (acyclovir, famciclovir, valacyclovir for YARELI) in the last 24 or 48 hours, respectively?      Are you pregnant or planning to become pregnant within 1 month? (YARELI, MMR, HPV, IPV, MenB, Abrexvy; For Hep B-  "refer to Engerix-B; For RSV - Abrysvo is indicated for 32-36 weeks of pregnancy from September to January)     For infants, have you ever been told your child has had intussusception or a medical emergency involving obstruction of the intestine (Rotavirus)? If not for an infant, can skip this question.         *Ordering Physicians/APC should be consulted if \"yes\" is checked by the patient or guardian above.  I have received, read, and understand the Vaccine Information Statement (VIS) for each vaccine ordered.  I have considered my or my child's health status as well as the health status of my close contacts.  I have taken the opportunity to discuss my vaccine questions with my or my child's health care provider.   I have requested that the ordered vaccine(s) be given to me or my child.  I understand the benefits and risks of the vaccines.  I understand that I should remain in the clinic for 15 minutes after receiving the vaccine(s).  _________________________________________________________  Signature of Patient or Parent/Legal Guardian ____________________  Date     "

## 2024-11-22 ENCOUNTER — TELEPHONE (OUTPATIENT)
Dept: PSYCHIATRY | Facility: CLINIC | Age: 44
End: 2024-11-22
Payer: COMMERCIAL

## 2024-11-22 NOTE — TELEPHONE ENCOUNTER
Pt says she was diagnosed with fibromyalgia by her PCP and you already give her 100 mg TID, but her PCP wanted her to take 300 mg BID for fibromyalgia.    Does her PCP need to take over the script for future PA's with fibromyalgia diagnosis?

## 2024-11-22 NOTE — TELEPHONE ENCOUNTER
Pt will call back and let me know if PCP is going to increase gabapentin; if so, I can cancel the 100 mg gabapentin refills.

## 2024-11-25 ENCOUNTER — TELEPHONE (OUTPATIENT)
Dept: INTERNAL MEDICINE | Age: 44
End: 2024-11-25
Payer: COMMERCIAL

## 2024-11-25 NOTE — TELEPHONE ENCOUNTER
Pharmacist says she has 1 refill left and she is due for a refill now before her Dec 12 appt.  I will cancel if she doesn't pick it up before the 12th appt, per pt request for me to hold off cancelling rx.

## 2024-11-25 NOTE — TELEPHONE ENCOUNTER
Since gabapentin is a controlled substance, patient would need an appointment with our office to discuss, complete controlled substance agreement and complete urine screen.

## 2024-11-25 NOTE — TELEPHONE ENCOUNTER
Caller: Brittany Sheppard    Relationship: Self    Best call back number:[phone]     Which medication are you concerned about: gabapentin (Neurontin) 100 MG capsule     What are your concerns: PATIENT CALLING STATING THAT SHE CONTACTED HER PSYCHIATRIST ABOUT INCREASING THE DOSAGE OF THE  MEDICATION SHE STATED THAT SHE DECLINED THE REQUEST. PATIENT STATES THAT SHE WAS OLD IF WILIAN MAGAÑA WANTS THE INCREASE THEN SHE WOULD HAVE TO DO IT AND THE PSYCHIATRIST WOULD DISCONTINUE THE PRESCRIPTION  AND LET WILIAN MAGAÑA TAKE OVER THE PRESCRIPTION.

## 2024-11-26 DIAGNOSIS — Z79.899 HIGH RISK MEDICATION USE: Primary | ICD-10-CM

## 2024-11-29 ENCOUNTER — TELEPHONE (OUTPATIENT)
Dept: PHYSICAL THERAPY | Facility: HOSPITAL | Age: 44
End: 2024-11-29
Payer: COMMERCIAL

## 2024-11-29 NOTE — TELEPHONE ENCOUNTER
Left voicemail regarding no show for today's appointment. Reminded pt of next appointment date and time, as well as reminded of 24 hour cancellation policy. Remind patient that there has been 2 straight no show's and on the third, patient will have to call and schedule any subsequent appointments. Requested pt call back if unable to make next appointment.

## 2024-12-03 DIAGNOSIS — F41.1 GENERALIZED ANXIETY DISORDER: Chronic | ICD-10-CM

## 2024-12-03 NOTE — TELEPHONE ENCOUNTER
Rx Refill Note  Requested Prescriptions     Pending Prescriptions Disp Refills    sertraline (Zoloft) 100 MG tablet 60 tablet 1     Sig: Take 2 tablets by mouth Daily.      Last office visit with prescribing clinician: 9/6/2024   Last telemedicine visit with prescribing clinician: Visit date not found   Next office visit with prescribing clinician: 12/5/2024   Office Visit with Isha Vickers MD (09/06/2024)                       Would you like a call back once the refill request has been completed: [] Yes [] No    If the office needs to give you a call back, can they leave a voicemail: [] Yes [] No    Mely Williamson MA  12/03/24, 12:07 EST

## 2024-12-04 RX ORDER — SERTRALINE HYDROCHLORIDE 100 MG/1
200 TABLET, FILM COATED ORAL DAILY
Qty: 60 TABLET | Refills: 0 | Status: SHIPPED | OUTPATIENT
Start: 2024-12-04

## 2024-12-05 ENCOUNTER — HOSPITAL ENCOUNTER (OUTPATIENT)
Dept: PHYSICAL THERAPY | Facility: HOSPITAL | Age: 44
Setting detail: THERAPIES SERIES
Discharge: HOME | End: 2024-12-05
Payer: COMMERCIAL

## 2024-12-05 ENCOUNTER — OFFICE VISIT (OUTPATIENT)
Dept: PSYCHIATRY | Facility: CLINIC | Age: 44
End: 2024-12-05
Payer: COMMERCIAL

## 2024-12-05 DIAGNOSIS — R26.9 IMPAIRED GAIT: ICD-10-CM

## 2024-12-05 DIAGNOSIS — F41.1 GENERALIZED ANXIETY DISORDER: Chronic | ICD-10-CM

## 2024-12-05 DIAGNOSIS — M25.562 CHRONIC PAIN OF LEFT KNEE: Primary | ICD-10-CM

## 2024-12-05 DIAGNOSIS — G89.29 CHRONIC PAIN OF LEFT KNEE: Primary | ICD-10-CM

## 2024-12-05 DIAGNOSIS — S83.242D ACUTE MEDIAL MENISCUS TEAR OF LEFT KNEE, SUBSEQUENT ENCOUNTER: ICD-10-CM

## 2024-12-05 DIAGNOSIS — F31.32 BIPOLAR 1 DISORDER, DEPRESSED, MODERATE: Primary | Chronic | ICD-10-CM

## 2024-12-05 PROCEDURE — 97530 THERAPEUTIC ACTIVITIES: CPT

## 2024-12-05 RX ORDER — GABAPENTIN 300 MG/1
300 CAPSULE ORAL 3 TIMES DAILY
Qty: 90 CAPSULE | Refills: 2 | Status: SHIPPED | OUTPATIENT
Start: 2024-12-05 | End: 2025-12-05

## 2024-12-05 RX ORDER — SERTRALINE HYDROCHLORIDE 100 MG/1
200 TABLET, FILM COATED ORAL DAILY
Qty: 60 TABLET | Refills: 2 | Status: SHIPPED | OUTPATIENT
Start: 2024-12-05

## 2024-12-05 NOTE — PROGRESS NOTES
"Subjective   Brittany Sheppard is a 44 y.o. 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   Finished HS in regular classes  The pt was disruptive in school, daydreaming, talking to other students, could not sit still, was always fidgety, moving, procrastination, missing deadlines, \"last minute person\"    The pt experienced similar issues at home, interrupting people, could not wait for her turn to talk   The pt was dsd with ADHD as a child, she was on meds but mother did not like her behavior on meds   In [id number] - the pt noticed mood swings, getting angry, argumentative with co-workers   At that time she did IOP at the [facility]   Last manic episodes - 2 weeks ago (impulsive spending, missing work, multiple projects, feeling invincible, pressured speech) that lasted 6-7 days   Depression 7/10, every day, all day long , associated with decreased E level., poor sleep, poor motivations, guilt feelings, denied AVH/Si/HI, no elicited delusions  Anxiety - intense and persistent   Few panic attacks   Alc 1 pint 2-3 times per week     Last visit the pt was started on gabapentin and she noticed decreased irritability, less cravings     Today the pt reported feeling less  depressed, she was dsd with fibromyalgia, she also had incident at work and injured her knee  Might need knee surgery   Depression 5/10 on average   The pt found caplyta to be effective and it is affordable   Denied AVH/SI/HI  Sleep - fragmented, taking trazodone PRN only          The following portions of the patient's history were reviewed and updated as appropriate: allergies, current medications, past family history, past medical history, past social history, past surgical history and " "problem list.    PAST PSYCHIATRIC HISTORY  [place]  No inpt  + IOP at the [facility] in 2010 2ry to anger issues  and was dsd with bipolar d/o   No SI,no attempts   [place] II  Defer     PAST OUTPATIENT TREATMENT  Diagnosis treated:  Affective Disorder, Anxiety/Panic Disorder  Treatment Type:  Medication Management  Dr Khan - psych on [facility] and worked with therapist there   Prior Psychiatric Medications:  Vraylar - tremor  Wellbutrin XL - helped her quit smoking, initially thought it was making her angry  Abilify - insomnia  Atarax - helps with sleep onset  Elavil - diaphoresis, RLS  Seroquel - diaphoresis, gaining weight, prescribed for sleep  Effexor - diaphoresis  Cymbalta - diaphoresis  Celexa - some benefit; was on it for six months and stopped taking it because it made her \"numb\"  Ambien - \"crazy stuff\" at night time  Trazodone - not effective   Campral - GI side effects, dry mouth  Buspar - did not notice a difference on 10 mg PO BID    LABORATORY - SCAN - [facility] RESULTS, [facility], 08/13/2024 (08/13/2024)       Support Groups:  Alcoholica Anonymous (AA)  The pt has a sponsor   Sequelae Of Mental Disorder:  job disruption, social isolation, emotional distress      Interval History  Improved     Side Effects  Denied       Past Medical History:  Past Medical History:   Diagnosis Date    Acute recurrent frontal sinusitis 09/01/2022    Anemia     Anxiety     Arthritis     Asthma     Back pain     Bipolar 1 disorder     COPD (chronic obstructive pulmonary disease)     Depression     Dyspnea     Elevated liver enzymes     GERD (gastroesophageal reflux disease)     H. 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    HPV in female 10/2015    Hypertension     IBS (irritable bowel syndrome)     Insomnia     LGSIL on Pap " smear of cervix 2016    cx biopsy confoirmed LGSIL    Morbid obesity with body mass index of 50.0-59.9 in adult 2014    Obesity     PCOS (polycystic ovarian syndrome)     PCR positive for herpes simplex virus type 1 (HSV-1) DNA     PONV (postoperative nausea and vomiting)     Preop cardiovascular exam 10/10/2014    Sleep apnea     HAS A C-PAP MACHINE    Steatosis of liver     liver biopsy; moderate; stage 3, grade 0    Vitamin D deficiency        Social History:  Social History     Socioeconomic History    Marital status:     Number of children: 0   Tobacco Use    Smoking status: Former     Current packs/day: 0.00     Types: Cigarettes     Quit date:      Years since quittin.9     Passive exposure: Never    Smokeless tobacco: Never   Vaping Use    Vaping status: Never Used   Substance and Sexual Activity    Alcohol use: Yes     Comment: off and on- won't drink for weeks and then will have a week where she drinks several days    Drug use: Never    Sexual activity: Defer     Partners: Male, Female     Alc -up to 1 pint of cognac 2-3 times per week   No DUI/PI  Few blackouts  Unable to control amount of consumed alc   The pt just came out of detox program for 8 days at [facility]   No hx of DT, no SZ   Some cravings         Family History:  Family History   Problem Relation Age of Onset    Hypertension Mother     Obesity Mother     Diabetes Mother     Sleep apnea Mother     Bipolar disorder Father     Diabetes Father     Hypertension Father     Migraines Father     Alcohol abuse Father     Stroke Maternal Grandmother     Breast cancer Maternal Grandmother 72    Diabetes Maternal Grandmother     Hypertension Maternal Grandmother     Stroke Maternal Grandfather     Cancer Maternal Grandfather     Stroke Paternal Grandmother     Breast cancer Paternal Grandmother     Hypertension Paternal Grandmother     Pancreatic cancer Paternal Grandmother     Schizophrenia Nephew     Bipolar disorder Nephew      Malig Hyperthermia Neg Hx        Past Surgical History:  Past Surgical History:   Procedure Laterality Date    APPENDECTOMY  07/19/2010    [facility]    BREAST SURGERY      I & D of abcess X3    CYST REMOVAL  09/2008    D & C HYSTEROSCOPY ENDOMETRIAL ABLATION  12/16/2010    menorrhagia; pathology benign    ENDOSCOPY N/A 10/29/2024    Procedure: ESOPHAGOGASTRODUODENOSCOPY WITH BIOPSY;  Surgeon: Melchor Duke Jr., MD;  Location: [facility] ENDOSCOPY;  Service: General;  Laterality: N/A;  PRE- DYSPEPSIA, H/O BAND REMOVAL  POST- GASTRITIS    LAPAROSCOPIC CHOLECYSTECTOMY      LAPAROSCOPIC GASTRIC BANDING  03/27/2017    Dr. Prieto Colon [facility]    LIVER BIOPSY      POLYPECTOMY      TOOTH EXTRACTION  2016    WISDOM TOOTH EXTRACTION  2012       Problem List:  Patient Active Problem List   Diagnosis    Abscess of axilla, left    Lumbago    Acute hepatitis C without hepatic coma    Generalized anxiety disorder    Asthma    Atopic contact dermatitis    Primary hypertension    Breast abscess of female    Cervical high risk HPV (human papillomavirus) test positive    Chondromalacia of right patella    Chronic pain of right knee    Gastroesophageal reflux disease    Hamstring tightness of right lower extremity    History of removal of laparoscopic gastric banding device    History of smoking    HSV-2 seropositive    Intractable chronic migraine without aura and without status migrainosus    LGSIL (low grade squamous intraepithelial dysplasia)    Patellofemoral syndrome of both knees    Pes anserinus bursitis of right knee    Primary osteoarthritis of both knees    Pruritic rash    Sepsis    Vitamin D deficiency    Osteoarthritis of multiple joints    Acute cystitis with hematuria    Generalized abdominal pain    KENDRA on CPAP    Bipolar 1 disorder, depressed, moderate    Alcohol use disorder, moderate, in early remission    Obesity, Class III, BMI 40-49.9 (morbid obesity)    Dietary counseling    Pre-op evaluation    Fatty liver    PCOS  (polycystic ovarian syndrome)    Fibromyalgia    Left knee pain       Allergy:   Allergies   Allergen Reactions    Cephalexin Hives    Penicillins Anaphylaxis and Hives    Prunus Persica Anaphylaxis    Cefaclor Hives    Morphine Hives and Itching    Adhesive Tape Other (See Comments)     Pulls skin off    Codeine Hives    Latex Hives and Rash     Hives and anaphlaxic          Discontinued Medications:  Medications Discontinued During This Encounter   Medication Reason    Lumateperone Tosylate (Caplyta) 10.5 MG capsule Dose adjustment    gabapentin (Neurontin) 100 MG capsule Reorder         Current Medications:   Current Outpatient Medications   Medication Sig Dispense Refill    gabapentin (Neurontin) 300 MG capsule Take 1 capsule by mouth 3 (Three) Times a Day. 90 capsule 2    sertraline (Zoloft) 100 MG tablet Take 2 tablets by mouth Daily. 60 tablet 2    albuterol sulfate HFA (Ventolin HFA) 108 (90 Base) MCG/ACT inhaler Inhale 2 puffs Every 4 (Four) Hours As Needed for Wheezing or Shortness of Air. 18 g 3    calcium carbonate (TUMS) 500 MG chewable tablet Chew 1 tablet As Needed for Indigestion or Heartburn.      hydroxychloroquine (PLAQUENIL) 200 MG tablet Take 1 tablet by mouth Every 12 (Twelve) Hours. 60 tablet 3    hydrOXYzine pamoate (Vistaril) 50 MG capsule Take 1-2 capsules by mouth 3 (Three) Times a Day As Needed for Sleep/Anxiety 90 capsule 1    Lumateperone Tosylate 21 MG capsule Take 1 capsule by mouth Every Night. 30 capsule 2    Melatonin 10 MG tablet Take 1 tablet by mouth Every Night.      metoprolol succinate XL (TOPROL-XL) 100 MG 24 hr tablet Take 1 tablet by mouth Daily. 90 tablet 1    sertraline (Zoloft) 100 MG tablet Take 2 tablets by mouth Daily. 60 tablet 0    valACYclovir (Valtrex) 500 MG tablet Take 1 tablet by mouth 2 (Two) Times a Day. 90 tablet 3     No current facility-administered medications for this visit.         Psychological ROS: positive for - anxiety, concentration difficulties,  depression, irritability, and mood swings  negative for - hallucinations or suicidal ideation      Physical Exam:   not currently breastfeeding.    Mental Status Exam:   Hygiene:   good  Cooperation:  Cooperative  Eye Contact:  Good  Psychomotor Behavior:  Appropriate  Affect:  Appropriate  Mood: euthymic and fluctates  Hopelessness: Denies  Speech:  Normal  Thought Process:  Goal directed and Linear  Thought Content:  Mood congruent  Suicidal:  None  Homicidal:  None  Hallucinations:  None  Delusion:  None  Memory:  Intact  Orientation:  Person, Place, Time, and Situation  Reliability:  fair  Insight:  Fair  Judgement:  Good  Impulse Control:  Fair  Physical/Medical Issues:  Yes        MSE from 9/6/24 reviewed and accepted with changes     PHQ-9 Depression Screening  Little interest or pleasure in doing things? Over half   Feeling down, depressed, or hopeless? Over half   PHQ-2 Total Score 4   Trouble falling or staying asleep, or sleeping too much? Several days   Feeling tired or having little energy? Over half   Poor appetite or overeating? Several days   Feeling bad about yourself - or that you are a failure or have let yourself or your family down? Several days   Trouble concentrating on things, such as reading the newspaper or watching television? Several days   Moving or speaking so slowly that other people could have noticed? Or the opposite - being so fidgety or restless that you have been moving around a lot more than usual? Over half   Thoughts that you would be better off dead, or of hurting yourself in some way? Not at all   PHQ-9 Total Score 12   If you checked off any problems, how difficult have these problems made it for you to do your work, take care of things at home, or get along with other people? Very difficult         Over the last two weeks, how often have you been bothered by the following problems?  Feeling nervous, anxious or on edge: More than half the days  Not being able to stop or  control worrying: More than half the days  Worrying too much about different things: Several days  Trouble Relaxing: Several days  Being so restless that it is hard to sit still: Several days  Becoming easily annoyed or irritable: Several days  Feeling afraid as if something awful might happen: More than half the days  ALYX 7 Total Score: 10  If you checked any problems, how difficult have these problems made it for you to do your work, take care of things at home, or get along with other people: Very difficult      Former smoker    I advised Brittany of the risks of tobacco use.     Lab Results:   Admission on 10/29/2024, Discharged on 10/29/2024   Component Date Value Ref Range Status    HCG, Urine, QL 10/29/2024 Negative  Negative Final    Lot Number 10/29/2024 [id number]   Final    Internal Positive Control 10/29/2024 Positive  Positive, Passed Final    Internal Negative Control 10/29/2024 Negative  Negative, Passed Final    Expiration Date 10/29/2024 01/28/2025   Final    Urease 10/29/2024 Positive (A)  Negative Final   Office Visit on 10/17/2024   Component Date Value Ref Range Status    Diagnosis 10/17/2024 Comment   Final    Comment: NEGATIVE FOR INTRAEPITHELIAL LESION OR MALIGNANCY.  THIS SPECIMEN WAS RESCREENED AS PART OF OUR  PROGRAM.      Specimen adequacy: 10/17/2024 Comment   Final    Comment: Satisfactory for evaluation.  Endocervical and/or squamous metaplastic  cells (endocervical component) are present.      Clinician Provided ICD-10: 10/17/2024 Comment   Final    Comment: Z12.4  Z87.42  Z11.3      Performed by: 10/17/2024 Comment   Final    Rajiv Valdez Cytotechnologist (ASCP)    QC reviewed by: 10/17/2024 Comment   Final    Rosa Brown Cytotechnologist    . 10/17/2024 .   Final    Note: 10/17/2024 Comment   Final    Comment: The Pap smear is a screening test designed to aid in the detection of  premalignant and malignant conditions of the uterine cervix.  It is not a  diagnostic  procedure and should not be used as the sole means of detecting  cervical cancer.  Both false-positive and false-negative reports do occur.      Method: 10/17/2024 Comment   Final    Comment: This liquid based ThinPrep(R) pap test was screened with the  use of an image guided system.      HPV Aptima 10/17/2024 Negative  Negative Final    Comment: This nucleic acid amplification test detects fourteen high-risk  HPV types (16,18,31,33,35,39,45,51,52,56,58,59,66,68) without  differentiation.      HPV Genotype Reflex 10/17/2024 Comment   Final    Criteria not met, HPV Genotype not performed.    Chlamydia, Nuc. Acid Amp 10/17/2024 Negative  Negative Final    Gonococcus by Nucleic Acid Amp 10/17/2024 Negative  Negative Final    Trichomonas vaginosis 10/17/2024 Positive (A)  Negative Final   Admission on 09/27/2024, Discharged on 09/28/2024   Component Date Value Ref Range Status    Glucose 09/27/2024 99  65 - 99 mg/dL Final    BUN 09/27/2024 8  6 - 20 mg/dL Final    Creatinine 09/27/2024 0.77  0.57 - 1.00 mg/dL Final    Sodium 09/27/2024 139  136 - 145 mmol/L Final    Potassium 09/27/2024 4.1  3.5 - 5.2 mmol/L Final    Slight hemolysis detected by analyzer. Result may be falsely elevated.    Chloride 09/27/2024 105  98 - 107 mmol/L Final    CO2 09/27/2024 23.5  22.0 - 29.0 mmol/L Final    Calcium 09/27/2024 8.9  8.6 - 10.5 mg/dL Final    Total Protein 09/27/2024 7.1  6.0 - 8.5 g/dL Final    Albumin 09/27/2024 4.0  3.5 - 5.2 g/dL Final    ALT (SGPT) 09/27/2024 19  1 - 33 U/L Final    AST (SGOT) 09/27/2024 25  1 - 32 U/L Final    Alkaline Phosphatase 09/27/2024 65  39 - 117 U/L Final    Total Bilirubin 09/27/2024 0.2  0.0 - 1.2 mg/dL Final    Globulin 09/27/2024 3.1  gm/dL Final    A/G Ratio 09/27/2024 1.3  g/dL Final    BUN/Creatinine Ratio 09/27/2024 10.4  7.0 - 25.0 Final    Anion Gap 09/27/2024 10.5  5.0 - 15.0 mmol/L Final    eGFR 09/27/2024 98.9  >60.0 mL/min/1.73 Final    WBC 09/27/2024 8.00  3.40 - 10.80 10*3/mm3  Final    RBC 09/27/2024 3.79  3.77 - 5.28 10*6/mm3 Final    Hemoglobin 09/27/2024 13.1  12.0 - 15.9 g/dL Final    Hematocrit 09/27/2024 37.8  34.0 - 46.6 % Final    MCV 09/27/2024 99.7 (H)  79.0 - 97.0 fL Final    MCH 09/27/2024 34.6 (H)  26.6 - 33.0 pg Final    MCHC 09/27/2024 34.7  31.5 - 35.7 g/dL Final    RDW 09/27/2024 12.9  12.3 - 15.4 % Final    RDW-SD 09/27/2024 46.8  37.0 - 54.0 fl Final    MPV 09/27/2024 10.2  6.0 - 12.0 fL Final    Platelets 09/27/2024 294  140 - 450 10*3/mm3 Final    Neutrophil % 09/27/2024 57.7  42.7 - 76.0 % Final    Lymphocyte % 09/27/2024 33.8  19.6 - 45.3 % Final    Monocyte % 09/27/2024 6.8  5.0 - 12.0 % Final    Eosinophil % 09/27/2024 0.9  0.3 - 6.2 % Final    Basophil % 09/27/2024 0.4  0.0 - 1.5 % Final    Immature Grans % 09/27/2024 0.4  0.0 - 0.5 % Final    Neutrophils, Absolute 09/27/2024 4.63  1.70 - 7.00 10*3/mm3 Final    Lymphocytes, Absolute 09/27/2024 2.70  0.70 - 3.10 10*3/mm3 Final    Monocytes, Absolute 09/27/2024 0.54  0.10 - 0.90 10*3/mm3 Final    Eosinophils, Absolute 09/27/2024 0.07  0.00 - 0.40 10*3/mm3 Final    Basophils, Absolute 09/27/2024 0.03  0.00 - 0.20 10*3/mm3 Final    Immature Grans, Absolute 09/27/2024 0.03  0.00 - 0.05 10*3/mm3 Final    nRBC 09/27/2024 0.0  0.0 - 0.2 /100 WBC Final       Assessment & Plan   Problems Addressed this Visit       Generalized anxiety disorder (Chronic)    Relevant Medications    Lumateperone Tosylate 21 MG capsule    gabapentin (Neurontin) 300 MG capsule    sertraline (Zoloft) 100 MG tablet    Bipolar 1 disorder, depressed, moderate - Primary    Relevant Medications    Lumateperone Tosylate 21 MG capsule    gabapentin (Neurontin) 300 MG capsule    sertraline (Zoloft) 100 MG tablet     Diagnoses         Codes Comments    Bipolar 1 disorder, depressed, moderate    -  Primary ICD-10-CM: F31.32  ICD-9-CM: 296.52     Generalized anxiety disorder     ICD-10-CM: F41.1  ICD-9-CM: 300.02             Visit Diagnoses:    ICD-10-CM  ICD-9-CM   1. Bipolar 1 disorder, depressed, moderate  F31.32 296.52   2. Generalized anxiety disorder  F41.1 300.02           TREATMENT PLAN/GOALS: Continue supportive psychotherapy efforts and medications as indicated. Treatment and medication options discussed during today's visit. Patient ackowledged and verbally consented to continue with current treatment plan and was educated on the importance of compliance with treatment and follow-up appointments.    MEDICATION ISSUES:  INSPECT reviewed as expected - 12/26/23 hydrocodone # 18 for 3 days, gabapentin 10/23/24      PHQ scored 12 - moderate depression   ALYX 7 scored 10 moderate anxiety   MDQ scored 13 (bipolar likely)  Patient screened positive for depression based on a PHQ-9 score of 12 on 12/5/2024. Follow-up recommendations include:  mood stabilizers .     Bipolar d/o, MRE depressed , moderate - the pt is currently on sertraline and trazodone, did not tolerate vraylar, and failed numerous med trials , responded well to  caplyta 10.5 mg po QHS , it has benign metabolic side effect profile , increase to 21 mg po QHS to achieve better sxs control   ALYX - cont gabapentin, increase to  300 mg po TID for anxiety,    cravings and  fibromyalgia    ADHD  combined  -consider strattera or qelbree when mood is stabilized     Genesight discussed - N folic acid conversion, caplyta,sertraline  - use as directed      Short term goals - to  keep working on herapeutic rapport, to stay compliant with meds   Long term goals - to improve sxs , to stay at optimal level of functioning       Discussed medication options and treatment plan of prescribed medication as well as the risks, benefits, and side effects including potential falls, possible impaired driving and metabolic adversities among others. Patient is agreeable to call the office with any worsening of symptoms or onset of side effects. Patient is agreeable to call 911 or go to the nearest ER should he/she begin having  SI/HI. No medication side effects or related complaints today.     MEDS ORDERED DURING VISIT:  New Medications Ordered This Visit   Medications    Lumateperone Tosylate 21 MG capsule     Sig: Take 1 capsule by mouth Every Night.     Dispense:  30 capsule     Refill:  2    gabapentin (Neurontin) 300 MG capsule     Sig: Take 1 capsule by mouth 3 (Three) Times a Day.     Dispense:  90 capsule     Refill:  2    sertraline (Zoloft) 100 MG tablet     Sig: Take 2 tablets by mouth Daily.     Dispense:  60 tablet     Refill:  2       Return in about 4 weeks (around 1/2/2025).           This document has been electronically signed by Isha Vickers MD  December 5, 2024 11:56 EST    Part of this note may be an electronic transcription/translation of spoken language to printed text using the Dragon Dictation System.

## 2024-12-05 NOTE — THERAPY DISCHARGE NOTE
Outpatient Physical Therapy Ortho Treatment Note/Discharge Summary  [facility]     Patient Name: Brittany Sheppard  : 1980  MRN: [id number]  Today's Date: 2024      Visit Date: 2024    Visit Dx:    ICD-10-CM ICD-9-CM   1. Chronic pain of left knee  M25.562 719.46    G89.29 338.29   2. Acute medial meniscus tear of left knee, subsequent encounter  S83.242D V58.89     836.0   3. Impaired gait  R26.9 781.2       Patient Active Problem List   Diagnosis    Abscess of axilla, left    Lumbago    Acute hepatitis C without hepatic coma    Generalized anxiety disorder    Asthma    Atopic contact dermatitis    Primary hypertension    Breast abscess of female    Cervical high risk HPV (human papillomavirus) test positive    Chondromalacia of right patella    Chronic pain of right knee    Gastroesophageal reflux disease    Hamstring tightness of right lower extremity    History of removal of laparoscopic gastric banding device    History of smoking    HSV-2 seropositive    Intractable chronic migraine without aura and without status migrainosus    LGSIL (low grade squamous intraepithelial dysplasia)    Patellofemoral syndrome of both knees    Pes anserinus bursitis of right knee    Primary osteoarthritis of both knees    Pruritic rash    Sepsis    Vitamin D deficiency    Osteoarthritis of multiple joints    Acute cystitis with hematuria    Generalized abdominal pain    KENDRA on CPAP    Bipolar 1 disorder, depressed, moderate    Alcohol use disorder, moderate, in early remission    Obesity, Class III, BMI 40-49.9 (morbid obesity)    Dietary counseling    Pre-op evaluation    Fatty liver    PCOS (polycystic ovarian syndrome)    Fibromyalgia    Left knee pain        Past Medical History:   Diagnosis Date    Acute recurrent frontal sinusitis 2022    Anemia     Anxiety     Arthritis     Asthma     Back pain     Bipolar 1 disorder     COPD (chronic obstructive pulmonary disease)     Depression     Dyspnea      Elevated liver enzymes     GERD (gastroesophageal reflux disease)     H. 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    HPV in female 10/2015    Hypertension     IBS (irritable bowel syndrome)     Insomnia     LGSIL on Pap smear of cervix 01/2016    cx biopsy confoirmed LGSIL    Morbid obesity with body mass index of 50.0-59.9 in adult 06/24/2014    Obesity     PCOS (polycystic ovarian syndrome)     PCR positive for herpes simplex virus type 1 (HSV-1) DNA 2018    PONV (postoperative nausea and vomiting)     Preop cardiovascular exam 10/10/2014    Sleep apnea     HAS A C-PAP MACHINE    Steatosis of liver     liver biopsy; moderate; stage 3, grade 0    Vitamin D deficiency         Past Surgical History:   Procedure Laterality Date    APPENDECTOMY  07/19/2010    [facility] [place]    BREAST SURGERY      I & D of abcess X3    CYST REMOVAL  09/2008    D & C HYSTEROSCOPY ENDOMETRIAL ABLATION  12/16/2010    menorrhagia; pathology benign    ENDOSCOPY N/A 10/29/2024    Procedure: ESOPHAGOGASTRODUODENOSCOPY WITH BIOPSY;  Surgeon: Melchor Duke Jr., MD;  Location: [facility] ENDOSCOPY;  Service: General;  Laterality: N/A;  PRE- DYSPEPSIA, H/O BAND REMOVAL  POST- GASTRITIS    LAPAROSCOPIC CHOLECYSTECTOMY      LAPAROSCOPIC GASTRIC BANDING  03/27/2017    Dr. Prieto Colon [facility]    LIVER BIOPSY      POLYPECTOMY      TOOTH EXTRACTION  2016    WISDOM TOOTH EXTRACTION  2012        PT Ortho       Row Name 12/05/24 1500       Myotomal Screen- Lower Quarter Clearing    Hip flexion (L2) Left:;4 (Good);Right:;5 (Normal)  -ER    Knee extension (L3) Left:;4- (Good -);Right:;5 (Normal)  -ER    Ankle DF (L4) Bilateral:;5 (Normal)  -ER    Knee flexion (S2) Left:;4- (Good -);Right:;4+ (Good +)  -ER       Knee Palpation    Medial Joint Line Left:;Tender  M>L   -ER    Lateral Joint Line Left:;Tender  -ER       Left Lower Ext    Lt Knee Extension/Flexion AROM -3-110  -ER              User Key  (r) = Recorded By, (t) = Taken By, (c) = Cosigned By      Initials Name Provider Type    Penny Miller, PT Physical Therapist                                 PT Assessment/Plan       Row Name 12/05/24 1500          PT Assessment    Assessment Comments Brittany Sheppard was seen for 6 physical therapy sessions for L knee pain.  Treatment included therapeutic exercise, manual therapy, therapeutic activity, neuro-muscular retraining , gait training, and patient education with home exercise program . Pt. Had f/u with MD regarding knee pain. She notes that she was advised to wear an IT band strap, however the clinic/[facility] did not have them in stock. She describes her improvement as minimal, rating it at 50% improvement and notes that she does not feel as if it will get any better. She has returned to work 5x a week. Progress to physical therapy goals was slow. Pt met 1/3 STG and 2/6 LTG. Pt. Will f/u on ITB brace and is encouraged to return if symptoms worsen. Educated pt. On attendance policy as well. Time spent discussing advanced HEP and appropriate progressions/modifications to make based on response following discharge and optimal frequency/duration to complete HEP over next several weeks-months. Patient verbalized understanding. She was discharged to an independent HEP and provided patient education to self-manage condition.  -ER        PT Plan    PT Plan Comments d/c  -ER               User Key  (r) = Recorded By, (t) = Taken By, (c) = Cosigned By      Initials Name Provider Type    Penny Miller PT Physical Therapist                         OP Exercises       Row Name 12/05/24 1500             Subjective    Subjective Comments I feel like this is as good as its going to get. I am back at work  -ER         Subjective Pain    Able to rate subjective pain? yes  -ER       Pre-Treatment Pain Level 5  -ER      Post-Treatment Pain Level 5  -ER      Subjective Pain Comment when I shift it - its an 11/10  -ER         Total Minutes    01244 - PT Therapeutic Activity Minutes 23  -ER                User Key  (r) = Recorded By, (t) = Taken By, (c) = Cosigned By      Initials Name Provider Type    ER Penny Garcia, PT Physical Therapist                                    PT OP Goals       Row Name 12/05/24 1500          PT Short Term Goals    STG Date to Achieve 11/20/24  -ER     STG 1 Pt will be independent with initial HEP to improve strength/ROM and decrease pain.  -ER     STG 1 Progress Met  -ER     STG 2 Pt will improve L knee AROM to at least 0-120 deg to improve ability to navigate stairs.  -ER     STG 2 Progress Ongoing  -ER     STG 3 Pt will improve walking tolerance to >20 minutes with no instances of buckling to assist with returning to work.  -ER     STG 3 Progress Ongoing  -ER        Long Term Goals    LTG Date to Achieve 12/20/24  -ER     LTG 1 Pt will be independent with advance HEP to improve strength/ROM and decrease pain.  -ER     LTG 1 Progress Met  -ER     LTG 2 Pt will improve B LE strength to at least 4+/5.  -ER     LTG 2 Progress Ongoing  -ER     LTG 3 Pt will be able to ascend/descend stairs in a reciprocal pattern with no increase pain.  -ER     LTG 3 Progress Met  -ER     LTG 4 Pt will report </= 2/10 pain with ADLs to improve quality of life.  -ER     LTG 4 Progress Ongoing  -ER     LTG 5 Pt will improve KOS score to 70% to show improved quality of life.  -ER     LTG 5 Progress Ongoing  -ER     LTG 6 Patient will improve ability to sleep through the night without sleep disturbances related to back pain to improve overall sleep quality and quality of life.  -ER     LTG 6 Progress Ongoing  -ER               User Key  (r) = Recorded By, (t) = Taken By, (c) = Cosigned By      Initials Name Provider Type    ER Penny Garcia, PT Physical Therapist                    Therapy  Education  Education Details: Full review of HEP. Educated pt on strength training guidelines with need to complete exercises 2-3x's per week, 2-3 sets at 8-12 reps. Discussed several ways to progress and modify each exercise as needed.  Given: HEP  Program: New, Reinforced  How Provided: Verbal, Written  Provided to: Patient  Level of Understanding: Verbalized    Outcome Measure Options: Knee Outcome Score- ADL  Knee Outcome Survey Activities of Daily Living Scale  Symptoms: Pain: The symptom affects my activity moderately  Symptoms: Stiffness: The symptom affects my activity moderately  Symptoms: Swelling: The symptom affects my activity moderately  Symptoms: Giving way, buckling, or shifting of the knee: The symptom affects my activity severely  Symptoms: Weakness: The symptom affects my activity moderately  Symptoms: Limping: The symptom affects my activity moderately  Functional Limitations with ADL's: Walk: Activity is very difficult  Functional Limitations with ADL's: Go up stairs: Activity is very difficult  Functional Limitations with ADL's: Go down stairs: Activity is very difficult  Functional Limitations with ADL's: Stand: Activity is somewhat difficult  Functional Limitations with ADL's: Kneel on front of your knee: I am unable to  Functional Limitations with ADL's: Squat: I am unable to  Functional Limitations with ADL's: Sit with your knee bent: Activity is minimally difficult  Functional Limitations with ADL's: Rise from a chair: Activity is somewhat difficult  Knee Outcome Summary ADL's Score: 34.29 %      Time Calculation:   Start Time: 1520  Stop Time: 1543  Time Calculation (min): 23 min  Total Timed Code Minutes- PT: 23 minute(s)  Timed Charges  03750 - PT Therapeutic Activity Minutes: 23  Total Minutes  Timed Charges Total Minutes: 23   Total Minutes: 23  Therapy Charges for Today       Code Description Service Date Service Provider Modifiers Qty    [id number]  PT THERAPEUTIC ACT EA 15 MIN  12/5/2024 Penny Garcia, PT GP 2            PT G-Codes  Outcome Measure Options: Knee Outcome Score- ADL     OP PT Discharge Summary  Reason for Discharge: Lack of progress  Outcomes Achieved: Patient able to partially acheive established goals  Discharge Destination: Home with home program  Discharge Instructions/Additional Comments: d/c for indep management      Penny Garcia, PT  12/5/2024

## 2024-12-12 ENCOUNTER — APPOINTMENT (OUTPATIENT)
Dept: PHYSICAL THERAPY | Facility: HOSPITAL | Age: 44
End: 2024-12-12
Payer: COMMERCIAL

## 2024-12-12 NOTE — TELEPHONE ENCOUNTER
Looks like pt cancelled her appt today with [facility], so I won't cancel her gabapentin refills; looks like she will continue to get gabapentin from Dr. Vickers for a while, and maybe get the increase for fibromyalgia at a later date from provider Porter.

## 2024-12-19 ENCOUNTER — APPOINTMENT (OUTPATIENT)
Dept: PHYSICAL THERAPY | Facility: HOSPITAL | Age: 44
End: 2024-12-19
Payer: COMMERCIAL

## 2024-12-21 LAB
APPEARANCE UR: ABNORMAL
BACTERIA #/AREA URNS HPF: ABNORMAL /[HPF]
BASOPHILS # BLD AUTO: NORMAL 10*3/UL
BILIRUB UR QL STRIP: NEGATIVE
CASTS URNS QL MICRO: ABNORMAL /LPF
CHOLEST SERPL-MCNC: NORMAL MG/DL
COLOR UR: YELLOW
EOSINOPHIL # BLD AUTO: NORMAL 10*3/UL
EOSINOPHIL NFR BLD AUTO: NORMAL %
EPI CELLS #/AREA URNS HPF: >10 /HPF (ref 0–10)
GLUCOSE UR QL STRIP: NEGATIVE
HBA1C MFR BLD: NORMAL %
HCT VFR BLD AUTO: NORMAL %
HDLC SERPL-MCNC: NORMAL MG/DL
HGB BLD-MCNC: NORMAL G/DL
HGB UR QL STRIP: NEGATIVE
KETONES UR QL STRIP: ABNORMAL
LEUKOCYTE ESTERASE UR QL STRIP: NEGATIVE
LYMPHOCYTES # BLD AUTO: NORMAL 10*3/UL
LYMPHOCYTES NFR BLD AUTO: NORMAL %
MICRO URNS: ABNORMAL
MONOCYTES NFR BLD AUTO: NORMAL %
NEUTROPHILS NFR BLD AUTO: NORMAL %
NITRITE UR QL STRIP: NEGATIVE
PH UR STRIP: 5.5 [PH] (ref 5–7.5)
PLATELET # BLD AUTO: NORMAL 10*3/UL
PROT UR QL STRIP: ABNORMAL
RBC # BLD AUTO: NORMAL 10*6/UL
RBC #/AREA URNS HPF: ABNORMAL /HPF (ref 0–2)
SP GR UR STRIP: 1.02 (ref 1–1.03)
SPECIMEN STATUS: NORMAL
TRIGL SERPL-MCNC: NORMAL MG/DL
UROBILINOGEN UR STRIP-MCNC: 0.2 MG/DL (ref 0.2–1)
VLDLC SERPL CALC-MCNC: NORMAL MG/DL
WBC # BLD AUTO: NORMAL X10E3/UL
WBC #/AREA URNS HPF: ABNORMAL /HPF (ref 0–5)

## 2024-12-22 DIAGNOSIS — R80.9 PROTEINURIA, UNSPECIFIED TYPE: Primary | ICD-10-CM

## 2024-12-23 DIAGNOSIS — I10 PRIMARY HYPERTENSION: Primary | ICD-10-CM

## 2024-12-23 DIAGNOSIS — Z00.00 ANNUAL PHYSICAL EXAM: ICD-10-CM

## 2025-01-02 ENCOUNTER — OFFICE VISIT (OUTPATIENT)
Dept: PSYCHIATRY | Facility: CLINIC | Age: 45
End: 2025-01-02
Payer: COMMERCIAL

## 2025-01-02 DIAGNOSIS — F90.0 ADHD, PREDOMINANTLY INATTENTIVE TYPE: Chronic | ICD-10-CM

## 2025-01-02 DIAGNOSIS — F41.1 GENERALIZED ANXIETY DISORDER: Chronic | ICD-10-CM

## 2025-01-02 DIAGNOSIS — F31.32 BIPOLAR 1 DISORDER, DEPRESSED, MODERATE: Primary | Chronic | ICD-10-CM

## 2025-01-02 RX ORDER — SERTRALINE HYDROCHLORIDE 100 MG/1
200 TABLET, FILM COATED ORAL DAILY
Qty: 60 TABLET | Refills: 2 | Status: SHIPPED | OUTPATIENT
Start: 2025-01-02

## 2025-01-02 RX ORDER — ATOMOXETINE 40 MG/1
40 CAPSULE ORAL DAILY
Qty: 5 CAPSULE | Refills: 0 | Status: SHIPPED | OUTPATIENT
Start: 2025-01-02 | End: 2025-01-07

## 2025-01-02 RX ORDER — GABAPENTIN 300 MG/1
300 CAPSULE ORAL 3 TIMES DAILY
Qty: 90 CAPSULE | Refills: 2 | Status: SHIPPED | OUTPATIENT
Start: 2025-01-02 | End: 2026-01-02

## 2025-01-02 RX ORDER — ATOMOXETINE 60 MG/1
60 CAPSULE ORAL DAILY
Qty: 30 CAPSULE | Refills: 1 | Status: SHIPPED | OUTPATIENT
Start: 2025-01-02

## 2025-01-02 NOTE — PROGRESS NOTES
"Subjective   Harriskp Sheppard is a 44 y.o. 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   Finished HS in regular classes  The pt was disruptive in school, daydreaming, talking to other students, could not sit still, was always fidgety, moving, procrastination, missing deadlines, \"last minute person\"    The pt experienced similar issues at home, interrupting people, could not wait for her turn to talk   The pt was dsd with ADHD as a child, she was on meds but mother did not like her behavior on meds   In [id number] - the pt noticed mood swings, getting angry, argumentative with co-workers   At that time she did IOP at the [facility]   Last manic episodes - 2 weeks ago (impulsive spending, missing work, multiple projects, feeling invincible, pressured speech) that lasted 6-7 days   Depression 7/10, every day, all day long , associated with decreased E level., poor sleep, poor motivations, guilt feelings, denied AVH/Si/HI, no elicited delusions  Anxiety - intense and persistent   Few panic attacks   Alc 1 pint 2-3 times per week     Last visit the pt was started on caplyta and it was increased to 21 mg     Today the pt reported feeling less  depressed, less irritable,  Her manager at work noticed positive changes  in her mood and attitude  Depression 5/10 on average   The pt found caplyta to be effective and it is affordable   Denied AVH/SI/HI  Sleep - fragmented, taking trazodone PRN only   Decreased concentration since school, was on meds at that time  Now mood is stable and still has concentration deficits that affect her functioning          The following portions of the patient's history were reviewed and updated as appropriate: allergies, current medications, past family history, past medical history, past " "social history, past surgical history and problem list.    PAST PSYCHIATRIC HISTORY  [place] [facility]  No inpt  + IOP at the [facility] in 2010 2ry to anger issues  and was dsd with bipolar d/o   No SI,no attempts   Axis II  Defer     PAST OUTPATIENT TREATMENT  Diagnosis treated:  Affective Disorder, Anxiety/Panic Disorder  Treatment Type:  Medication Management  Dr Khan - psych on [address] and worked with therapist there   Prior Psychiatric Medications:  Vraylar - tremor  Wellbutrin XL - helped her quit smoking, initially thought it was making her angry  Abilify - insomnia  Atarax - helps with sleep onset  Elavil - diaphoresis, RLS  Seroquel - diaphoresis, gaining weight, prescribed for sleep  Effexor - diaphoresis  Cymbalta - diaphoresis  Celexa - some benefit; was on it for six months and stopped taking it because it made her \"numb\"  Ambien - \"crazy stuff\" at night time  Trazodone - not effective   Campral - GI side effects, dry mouth  Buspar - did not notice a difference on 10 mg PO BID    LABORATORY - SCAN - [facility] RESULTS, [facility], 08/13/2024 (08/13/2024)       Support Groups:  Alcoholica Anonymous (AA)  The pt has a sponsor   Sequelae Of Mental Disorder:  job disruption, social isolation, emotional distress      Interval History  Improved     Side Effects  Denied       Past Medical History:  Past Medical History:   Diagnosis Date    Acute recurrent frontal sinusitis 09/01/2022    Anemia     Anxiety     Arthritis     Asthma     Back pain     Bipolar 1 disorder     COPD (chronic obstructive pulmonary disease)     Depression     Dyspnea     Elevated liver enzymes     GERD (gastroesophageal reflux disease)     H. 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    HPV in female 10/2015    Hypertension     IBS (irritable " bowel syndrome)     Insomnia     LGSIL on Pap smear of cervix 2016    cx biopsy confoirmed LGSIL    Morbid obesity with body mass index of 50.0-59.9 in adult 2014    Obesity     PCOS (polycystic ovarian syndrome)     PCR positive for herpes simplex virus type 1 (HSV-1) DNA     PONV (postoperative nausea and vomiting)     Preop cardiovascular exam 10/10/2014    Sleep apnea     HAS A C-PAP MACHINE    Steatosis of liver     liver biopsy; moderate; stage 3, grade 0    Vitamin D deficiency        Social History:  Social History     Socioeconomic History    Marital status:     Number of children: 0   Tobacco Use    Smoking status: Former     Current packs/day: 0.00     Types: Cigarettes     Quit date:      Years since quittin.0     Passive exposure: Never    Smokeless tobacco: Never   Vaping Use    Vaping status: Never Used   Substance and Sexual Activity    Alcohol use: Yes     Comment: off and on- won't drink for weeks and then will have a week where she drinks several days    Drug use: Never    Sexual activity: Defer     Partners: Male, Female     Alc -up to 1 pint of cognac 2-3 times per week   No DUI/PI  Few blackouts  Unable to control amount of consumed alc   The pt just came out of detox program for 8 days at [facility]   No hx of DT, no SZ   Some cravings         Family History:  Family History   Problem Relation Age of Onset    Hypertension Mother     Obesity Mother     Diabetes Mother     Sleep apnea Mother     Bipolar disorder Father     Diabetes Father     Hypertension Father     Migraines Father     Alcohol abuse Father     Stroke Maternal Grandmother     Breast cancer Maternal Grandmother 72    Diabetes Maternal Grandmother     Hypertension Maternal Grandmother     Stroke Maternal Grandfather     Cancer Maternal Grandfather     Stroke Paternal Grandmother     Breast cancer Paternal Grandmother     Hypertension Paternal Grandmother     Pancreatic cancer Paternal Grandmother      Schizophrenia Nephew     Bipolar disorder Nephew     Malig Hyperthermia Neg Hx        Past Surgical History:  Past Surgical History:   Procedure Laterality Date    APPENDECTOMY  07/19/2010    [facility]    BREAST SURGERY      I & D of abcess X3    CYST REMOVAL  09/2008    D & C HYSTEROSCOPY ENDOMETRIAL ABLATION  12/16/2010    menorrhagia; pathology benign    ENDOSCOPY N/A 10/29/2024    Procedure: ESOPHAGOGASTRODUODENOSCOPY WITH BIOPSY;  Surgeon: Melchor Duke Jr., MD;  Location: [facility] ENDOSCOPY;  Service: General;  Laterality: N/A;  PRE- DYSPEPSIA, H/O BAND REMOVAL  POST- GASTRITIS    LAPAROSCOPIC CHOLECYSTECTOMY      LAPAROSCOPIC GASTRIC BANDING  03/27/2017    Dr. Prieto Colon [facility]    LIVER BIOPSY      POLYPECTOMY      TOOTH EXTRACTION  2016    WISDOM TOOTH EXTRACTION  2012       Problem List:  Patient Active Problem List   Diagnosis    Abscess of axilla, left    Lumbago    Acute hepatitis C without hepatic coma    Generalized anxiety disorder    Asthma    Atopic contact dermatitis    Primary hypertension    Breast abscess of female    Cervical high risk HPV (human papillomavirus) test positive    Chondromalacia of right patella    Chronic pain of right knee    Gastroesophageal reflux disease    Hamstring tightness of right lower extremity    History of removal of laparoscopic gastric banding device    History of smoking    HSV-2 seropositive    Intractable chronic migraine without aura and without status migrainosus    LGSIL (low grade squamous intraepithelial dysplasia)    Patellofemoral syndrome of both knees    Pes anserinus bursitis of right knee    Primary osteoarthritis of both knees    Pruritic rash    Sepsis    Vitamin D deficiency    Osteoarthritis of multiple joints    Acute cystitis with hematuria    Generalized abdominal pain    KENDRA on CPAP    Bipolar 1 disorder, depressed, moderate    Alcohol use disorder, moderate, in early remission    Obesity, Class III, BMI 40-49.9 (morbid obesity)    Dietary  counseling    Pre-op evaluation    Fatty liver    PCOS (polycystic ovarian syndrome)    Fibromyalgia    Left knee pain    ADHD, predominantly inattentive type       Allergy:   Allergies   Allergen Reactions    Cephalexin Hives    Penicillins Anaphylaxis and Hives    Prunus Persica Anaphylaxis    Cefaclor Hives    Morphine Hives and Itching    Adhesive Tape Other (See Comments)     Pulls skin off    Codeine Hives    Latex Hives and Rash     Hives and anaphlaxic          Discontinued Medications:  Medications Discontinued During This Encounter   Medication Reason    sertraline (Zoloft) 100 MG tablet Duplicate order    Lumateperone Tosylate 21 MG capsule Reorder    gabapentin (Neurontin) 300 MG capsule Reorder    sertraline (Zoloft) 100 MG tablet Reorder           Current Medications:   Current Outpatient Medications   Medication Sig Dispense Refill    gabapentin (Neurontin) 300 MG capsule Take 1 capsule by mouth 3 (Three) Times a Day. 90 capsule 2    Lumateperone Tosylate 21 MG capsule Take 1 capsule by mouth Every Night. 30 capsule 2    sertraline (Zoloft) 100 MG tablet Take 2 tablets by mouth Daily. 60 tablet 2    albuterol sulfate HFA (Ventolin HFA) 108 (90 Base) MCG/ACT inhaler Inhale 2 puffs Every 4 (Four) Hours As Needed for Wheezing or Shortness of Air. 18 g 3    atomoxetine (Strattera) 40 MG capsule Take 1 capsule by mouth Daily for 5 days. 5 capsule 0    atomoxetine (Strattera) 60 MG capsule Take 1 capsule by mouth Daily. 30 capsule 1    calcium carbonate (TUMS) 500 MG chewable tablet Chew 1 tablet As Needed for Indigestion or Heartburn.      hydroxychloroquine (PLAQUENIL) 200 MG tablet Take 1 tablet by mouth Every 12 (Twelve) Hours. 60 tablet 3    hydrOXYzine pamoate (Vistaril) 50 MG capsule Take 1-2 capsules by mouth 3 (Three) Times a Day As Needed for Sleep/Anxiety 90 capsule 1    Melatonin 10 MG tablet Take 1 tablet by mouth Every Night.      metoprolol succinate XL (TOPROL-XL) 100 MG 24 hr tablet Take 1  tablet by mouth Daily. 90 tablet 1    valACYclovir (Valtrex) 500 MG tablet Take 1 tablet by mouth 2 (Two) Times a Day. 90 tablet 3     No current facility-administered medications for this visit.         Psychological ROS: positive for - anxiety, concentration difficulties, depression, irritability, and mood swings  negative for - hallucinations or suicidal ideation      Physical Exam:   not currently breastfeeding.    Mental Status Exam:   Hygiene:   good  Cooperation:  Cooperative  Eye Contact:  Good  Psychomotor Behavior:  Appropriate  Affect:  Appropriate  Mood: euthymic and fluctates  Hopelessness: Denies  Speech:  Normal  Thought Process:  Goal directed and Linear  Thought Content:  Mood congruent  Suicidal:  None  Homicidal:  None  Hallucinations:  None  Delusion:  None  Memory:  Intact  Orientation:  Person, Place, Time, and Situation  Reliability:  fair  Insight:  Fair  Judgement:  Good  Impulse Control:  Fair  Physical/Medical Issues:  Yes        MSE from 12/5/24 reviewed and accepted without changes     PHQ-9 Depression Screening  Little interest or pleasure in doing things? Almost all   Feeling down, depressed, or hopeless? Over half   PHQ-2 Total Score 5   Trouble falling or staying asleep, or sleeping too much? Over half   Feeling tired or having little energy? Several days   Poor appetite or overeating? Not at all   Feeling bad about yourself - or that you are a failure or have let yourself or your family down? Several days   Trouble concentrating on things, such as reading the newspaper or watching television? Not at all   Moving or speaking so slowly that other people could have noticed? Or the opposite - being so fidgety or restless that you have been moving around a lot more than usual? Not at all   Thoughts that you would be better off dead, or of hurting yourself in some way? Not at all   PHQ-9 Total Score 9   If you checked off any problems, how difficult have these problems made it for you to  do your work, take care of things at home, or get along with other people? Very difficult         Over the last two weeks, how often have you been bothered by the following problems?  Feeling nervous, anxious or on edge: More than half the days  Not being able to stop or control worrying: More than half the days  Worrying too much about different things: More than half the days  Trouble Relaxing: Several days  Being so restless that it is hard to sit still: More than half the days  Becoming easily annoyed or irritable: More than half the days  Feeling afraid as if something awful might happen: More than half the days  ALYX 7 Total Score: 13  If you checked any problems, how difficult have these problems made it for you to do your work, take care of things at home, or get along with other people: Very difficult      Former smoker    I advised Brittany of the risks of tobacco use.     Lab Results:   Office Visit on 11/21/2024   Component Date Value Ref Range Status    WBC 12/20/2024 CANCELED  x10E3/uL Final-Edited    Comment: Test not performed. No lavender top tube submitted.    Result canceled by the ancillary.      RBC 12/20/2024 CANCELED   Final-Edited    Comment: Test not performed    Result canceled by the ancillary.      Hemoglobin 12/20/2024 CANCELED   Final-Edited    Comment: Test not performed    Result canceled by the ancillary.      Hematocrit 12/20/2024 CANCELED   Final-Edited    Comment: Test not performed    Result canceled by the ancillary.      Platelets 12/20/2024 CANCELED   Final-Edited    Comment: Test not performed    Result canceled by the ancillary.      Neutrophil Rel % 12/20/2024 CANCELED   Final-Edited    Comment: Test not performed    Result canceled by the ancillary.      Lymphocyte Rel % 12/20/2024 CANCELED   Final-Edited    Comment: Test not performed    Result canceled by the ancillary.      Monocyte Rel % 12/20/2024 CANCELED   Final-Edited    Comment: Test not performed    Result canceled  by the ancillary.      Eosinophil Rel % 12/20/2024 CANCELED   Final-Edited    Comment: Test not performed    Result canceled by the ancillary.      Lymphocytes Absolute 12/20/2024 CANCELED   Final-Edited    Comment: Test not performed    Result canceled by the ancillary.      Eosinophils Absolute 12/20/2024 CANCELED   Final-Edited    Comment: Test not performed    Result canceled by the ancillary.      Basophils Absolute 12/20/2024 CANCELED   Final-Edited    Comment: Test not performed    Result canceled by the ancillary.      Hemoglobin A1C 12/20/2024 CANCELED  % Final-Edited    Comment: Test not performed. No lavender top tube submitted.           Prediabetes: 5.7 - 6.4           Diabetes: >6.4           Glycemic control for adults with diabetes: <7.0    Result canceled by the ancillary.      Total Cholesterol 12/20/2024 CANCELED  mg/dL Final-Edited    Comment: Test not performed. No specimen received.    Result canceled by the ancillary.      Triglycerides 12/20/2024 CANCELED   Final-Edited    Comment: Test not performed    Result canceled by the ancillary.      HDL Cholesterol 12/20/2024 CANCELED   Final-Edited    Comment: Test not performed    Result canceled by the ancillary.      VLDL Cholesterol [place] 12/20/2024 CANCELED  mg/dL Final-Edited    Comment: Unable to calculate result since non-numeric result obtained for  component test.    Result canceled by the ancillary.      Specific [place], UA 12/20/2024 1.024  1.005 - 1.030 Final    pH, UA 12/20/2024 5.5  5.0 - 7.5 Final    Color, UA 12/20/2024 Yellow  Yellow Final    Appearance, UA 12/20/2024 Cloudy (A)  Clear Final    Leukocytes, UA 12/20/2024 Negative  Negative Final    Protein 12/20/2024 1+ (A)  Negative/Trace Final    Glucose, UA 12/20/2024 Negative  Negative Final    Ketones 12/20/2024 Trace (A)  Negative Final    Blood, UA 12/20/2024 Negative  Negative Final    Bilirubin, UA 12/20/2024 Negative  Negative Final    Urobilinogen, UA 12/20/2024 0.2  0.2 -  1.0 mg/dL Final    Nitrite, UA 12/20/2024 Negative  Negative Final    Microscopic Examination 12/20/2024 See below:   Final    Microscopic was indicated and was performed.    WBC, UA 12/20/2024 None seen  0 - 5 /hpf Final    RBC, UA 12/20/2024 0-2  0 - 2 /hpf Final    Epithelial Cells (non renal) 12/20/2024 >10 (A)  0 - 10 /hpf Final    Casts 12/20/2024 None seen  None seen /lpf Final    Bacteria, UA 12/20/2024 Many (A)  None seen/Few Final    Specimen Status 12/20/2024 CANCELED   Final-Edited    Comment: Test not performed. No specimen received.        TEST:  [id number]  TSH+Free T4               [id number]  Comp. Metabolic Panel (14)               [id number]  Lipid Panel w/ Chol/HDL Ratio               [id number]  Ct, Ng, Trich vag by MAGGIE    Result canceled by the ancillary.     Admission on 10/29/2024, Discharged on 10/29/2024   Component Date Value Ref Range Status    HCG, Urine, QL 10/29/2024 Negative  Negative Final    Lot Number 10/29/2024 [id number]   Final    Internal Positive Control 10/29/2024 Positive  Positive, Passed Final    Internal Negative Control 10/29/2024 Negative  Negative, Passed Final    Expiration Date 10/29/2024 01/28/2025   Final    Urease 10/29/2024 Positive (A)  Negative Final   Office Visit on 10/17/2024   Component Date Value Ref Range Status    Diagnosis 10/17/2024 Comment   Final    Comment: NEGATIVE FOR INTRAEPITHELIAL LESION OR MALIGNANCY.  THIS SPECIMEN WAS RESCREENED AS PART OF OUR  PROGRAM.      Specimen adequacy: 10/17/2024 Comment   Final    Comment: Satisfactory for evaluation.  Endocervical and/or squamous metaplastic  cells (endocervical component) are present.      Clinician Provided ICD-10: 10/17/2024 Comment   Final    Comment: Z12.4  Z87.42  Z11.3      Performed by: 10/17/2024 Comment   Final    Rajiv Valdez Cytotechnologist (ASCP)    QC reviewed by: 10/17/2024 Comment   Final    Rosa Brown Cytotechnologist    . 10/17/2024 .   Final    Note: 10/17/2024 Comment   Final     Comment: The Pap smear is a screening test designed to aid in the detection of  premalignant and malignant conditions of the uterine cervix.  It is not a  diagnostic procedure and should not be used as the sole means of detecting  cervical cancer.  Both false-positive and false-negative reports do occur.      Method: 10/17/2024 Comment   Final    Comment: This liquid based ThinPrep(R) pap test was screened with the  use of an image guided system.      HPV Aptima 10/17/2024 Negative  Negative Final    Comment: This nucleic acid amplification test detects fourteen high-risk  HPV types (16,18,31,33,35,39,45,51,52,56,58,59,66,68) without  differentiation.      HPV Genotype Reflex 10/17/2024 Comment   Final    Criteria not met, HPV Genotype not performed.    Chlamydia, Nuc. Acid Amp 10/17/2024 Negative  Negative Final    Gonococcus by Nucleic Acid Amp 10/17/2024 Negative  Negative Final    Trichomonas vaginosis 10/17/2024 Positive (A)  Negative Final       Assessment & Plan   Problems Addressed this Visit       Generalized anxiety disorder (Chronic)    Relevant Medications    Lumateperone Tosylate 21 MG capsule    gabapentin (Neurontin) 300 MG capsule    sertraline (Zoloft) 100 MG tablet    atomoxetine (Strattera) 40 MG capsule    atomoxetine (Strattera) 60 MG capsule    Bipolar 1 disorder, depressed, moderate - Primary    Relevant Medications    Lumateperone Tosylate 21 MG capsule    gabapentin (Neurontin) 300 MG capsule    sertraline (Zoloft) 100 MG tablet    atomoxetine (Strattera) 40 MG capsule    atomoxetine (Strattera) 60 MG capsule    ADHD, predominantly inattentive type (Chronic)    Relevant Medications    Lumateperone Tosylate 21 MG capsule    sertraline (Zoloft) 100 MG tablet    atomoxetine (Strattera) 40 MG capsule    atomoxetine (Strattera) 60 MG capsule     Diagnoses         Codes Comments    Bipolar 1 disorder, depressed, moderate    -  Primary ICD-10-CM: F31.32  ICD-9-CM: 296.52     Generalized anxiety  disorder     ICD-10-CM: F41.1  ICD-9-CM: 300.02     ADHD, predominantly inattentive type     ICD-10-CM: F90.0  ICD-9-CM: 314.00             Visit Diagnoses:    ICD-10-CM ICD-9-CM   1. Bipolar 1 disorder, depressed, moderate  F31.32 296.52   2. Generalized anxiety disorder  F41.1 300.02   3. ADHD, predominantly inattentive type  F90.0 314.00             TREATMENT PLAN/GOALS: Continue supportive psychotherapy efforts and medications as indicated. Treatment and medication options discussed during today's visit. Patient ackowledged and verbally consented to continue with current treatment plan and was educated on the importance of compliance with treatment and follow-up appointments.    MEDICATION ISSUES:  INSPECT reviewed as expected - 12/26/23 hydrocodone # 18 for 3 days, gabapentin 10/23/24      PHQ scored 9 - mild depression   ALYX 7 scored 13 moderate anxiety   MDQ scored 13 (bipolar likely)  Patient screened positive for depression based on a PHQ-9 score of 9 on 1/2/2025. Follow-up recommendations include:  mood stabilizers .     Bipolar d/o, MRE depressed , moderate - the pt is currently on sertraline and trazodone, did not tolerate vraylar, and failed numerous med trials , responded well to  caplyta 21 mg po QHS , it has benign metabolic side effect profile, no changes necessary   ALYX - cont gabapentin  300 mg po TID for anxiety,    cravings and  fibromyalgia    ADHD  combined  -start  strattera 40-60 mg po QAM     Genesight discussed - N folic acid conversion, caplyta,sertraline  - use as directed      Short term goals - to  keep working on therapeutic rapport, to stay compliant with meds   Long term goals - to improve sxs , to stay at optimal level of functioning       Discussed medication options and treatment plan of prescribed medication as well as the risks, benefits, and side effects including potential falls, possible impaired driving and metabolic adversities among others. Patient is agreeable to call  the office with any worsening of symptoms or onset of side effects. Patient is agreeable to call 911 or go to the nearest ER should he/she begin having SI/HI. No medication side effects or related complaints today.     MEDS ORDERED DURING VISIT:  New Medications Ordered This Visit   Medications    Lumateperone Tosylate 21 MG capsule     Sig: Take 1 capsule by mouth Every Night.     Dispense:  30 capsule     Refill:  2    gabapentin (Neurontin) 300 MG capsule     Sig: Take 1 capsule by mouth 3 (Three) Times a Day.     Dispense:  90 capsule     Refill:  2    sertraline (Zoloft) 100 MG tablet     Sig: Take 2 tablets by mouth Daily.     Dispense:  60 tablet     Refill:  2    atomoxetine (Strattera) 40 MG capsule     Sig: Take 1 capsule by mouth Daily for 5 days.     Dispense:  5 capsule     Refill:  0    atomoxetine (Strattera) 60 MG capsule     Sig: Take 1 capsule by mouth Daily.     Dispense:  30 capsule     Refill:  1       Return in about 2 months (around 3/2/2025).           This document has been electronically signed by Isha Vickers MD  January 2, 2025 11:02 EST    Part of this note may be an electronic transcription/translation of spoken language to printed text using the Dragon Dictation System.

## 2025-01-09 ENCOUNTER — OFFICE VISIT (OUTPATIENT)
Dept: ORTHOPEDIC SURGERY | Facility: CLINIC | Age: 45
End: 2025-01-09
Payer: COMMERCIAL

## 2025-01-09 VITALS — BODY MASS INDEX: 48.18 KG/M2 | HEIGHT: 64 IN | WEIGHT: 282.2 LBS | TEMPERATURE: 98.4 F

## 2025-01-09 DIAGNOSIS — M17.12 PRIMARY OSTEOARTHRITIS OF LEFT KNEE: ICD-10-CM

## 2025-01-09 DIAGNOSIS — M89.9 BONE LESION: ICD-10-CM

## 2025-01-09 DIAGNOSIS — M76.32 ILIOTIBIAL BAND TENDONITIS OF LEFT SIDE: Primary | ICD-10-CM

## 2025-01-09 NOTE — PROGRESS NOTES
Patient: Brittany Sheppard  YOB: 1980 44 y.o. female  Medical Record Number: [id number]    Chief Complaints:   Chief Complaint   Patient presents with    Left Knee - Follow-up       History of Present Illness:Brittany Sheppard is a 44 y.o. female who presents for follow-up of left knee pain.  Patient has been seen and treated for her left knee she feels much improved today as finished her therapy but is able to go back she feels she needs however she is doing home therapy exercises doing well she has been back to work.    Allergies:   Allergies   Allergen Reactions    Cephalexin Hives    Penicillins Anaphylaxis and Hives    Prunus Persica Anaphylaxis    Cefaclor Hives    Morphine Hives and Itching    Adhesive Tape Other (See Comments)     Pulls skin off    Codeine Hives    Latex Hives and Rash     Hives and anaphlaxic         Medications:   Current Outpatient Medications   Medication Sig Dispense Refill    albuterol sulfate HFA (Ventolin HFA) 108 (90 Base) MCG/ACT inhaler Inhale 2 puffs Every 4 (Four) Hours As Needed for Wheezing or Shortness of Air. 18 g 3    atomoxetine (Strattera) 60 MG capsule Take 1 capsule by mouth Daily. 30 capsule 1    calcium carbonate (TUMS) 500 MG chewable tablet Chew 1 tablet As Needed for Indigestion or Heartburn.      gabapentin (Neurontin) 300 MG capsule Take 1 capsule by mouth 3 (Three) Times a Day. 90 capsule 2    hydroxychloroquine (PLAQUENIL) 200 MG tablet Take 1 tablet by mouth Every 12 (Twelve) Hours. 60 tablet 3    hydrOXYzine pamoate (Vistaril) 50 MG capsule Take 1-2 capsules by mouth 3 (Three) Times a Day As Needed for Sleep/Anxiety 90 capsule 1    Lumateperone Tosylate 21 MG capsule Take 1 capsule by mouth Every Night. 30 capsule 2    Melatonin 10 MG tablet Take 1 tablet by mouth Every Night.      metoprolol succinate XL (TOPROL-XL) 100 MG 24 hr tablet Take 1 tablet by mouth Daily. 90 tablet 1    sertraline (Zoloft) 100 MG tablet Take 2 tablets by mouth Daily. 60  "tablet 2    valACYclovir (Valtrex) 500 MG tablet Take 1 tablet by mouth 2 (Two) Times a Day. 90 tablet 3     No current facility-administered medications for this visit.         The following portions of the patient's history were reviewed and updated as appropriate: allergies, current medications, past family history, past medical history, past social history, past surgical history and problem list.    Review of Systems:   A 14 point review of systems was performed. All systems negative except pertinent positives/negative listed in HPI above    Physical Exam:   Vitals:    01/09/25 1342   Temp: 98.4 °F (36.9 °C)   TempSrc: Temporal   Weight: 128 kg (282 lb 3.2 oz)   Height: 162.6 cm (64.02\")   PainSc:   3   PainLoc: Knee       General: A and O x 3, ASA, NAD    SCLERA:    Normal    DENTITION:   Normal  Left knee examined no overt tenderness palpation gentle motion tolerated motor and sensory intact distally.  She is ambulating with near normal gait unassisted        Assessment/Plan:  Left knee pain, IT band tendinitis, knee arthritis, distal femoral bone lesion    Patient is progressing well with conservative treatment her knee is much better and she is getting back to baseline.  Still has occasional discomfort here and there but overall is functional.  Regards to previous MRI finding suggestive of benign appearing distal femoral bone lesion or enchondroma they recommended repeat MRI and thus that will be ordered today for her to get in the next 2 to 6 weeks.  Once it has been completed she gives a call we can review results.  Patient will continue conservative treatment and the above and may follow-up as needed at this time.  Again we will discuss MRI findings with her once done she will give us a call.  All questions answered.  Patient understands and agrees the plan.  "

## 2025-02-13 DIAGNOSIS — I10 PRIMARY HYPERTENSION: ICD-10-CM

## 2025-02-13 DIAGNOSIS — Z00.00 ANNUAL PHYSICAL EXAM: ICD-10-CM

## 2025-02-13 DIAGNOSIS — Z11.3 ROUTINE SCREENING FOR STI (SEXUALLY TRANSMITTED INFECTION): Primary | ICD-10-CM

## 2025-02-14 LAB
ALBUMIN SERPL-MCNC: 4 G/DL (ref 3.9–4.9)
ALP SERPL-CCNC: 70 IU/L (ref 44–121)
ALT SERPL-CCNC: 13 IU/L (ref 0–32)
APPEARANCE UR: CLEAR
AST SERPL-CCNC: 16 IU/L (ref 0–40)
BASOPHILS # BLD AUTO: 0 X10E3/UL (ref 0–0.2)
BASOPHILS NFR BLD AUTO: 0 %
BILIRUB SERPL-MCNC: 0.4 MG/DL (ref 0–1.2)
BILIRUB UR QL STRIP: NEGATIVE
BUN SERPL-MCNC: 13 MG/DL (ref 6–24)
BUN/CREAT SERPL: 16 (ref 9–23)
C TRACH RRNA SPEC QL NAA+PROBE: NEGATIVE
CALCIUM SERPL-MCNC: 9.1 MG/DL (ref 8.7–10.2)
CHLORIDE SERPL-SCNC: 103 MMOL/L (ref 96–106)
CHOLEST SERPL-MCNC: 174 MG/DL (ref 100–199)
CHOLEST/HDLC SERPL: 3.1 RATIO (ref 0–4.4)
CO2 SERPL-SCNC: 24 MMOL/L (ref 20–29)
COLOR UR: YELLOW
CREAT SERPL-MCNC: 0.81 MG/DL (ref 0.57–1)
EGFRCR SERPLBLD CKD-EPI 2021: 93 ML/MIN/1.73
EOSINOPHIL # BLD AUTO: 0.1 X10E3/UL (ref 0–0.4)
EOSINOPHIL NFR BLD AUTO: 2 %
ERYTHROCYTE [DISTWIDTH] IN BLOOD BY AUTOMATED COUNT: 12.5 % (ref 11.7–15.4)
GLOBULIN SER CALC-MCNC: 3 G/DL (ref 1.5–4.5)
GLUCOSE SERPL-MCNC: 75 MG/DL (ref 70–99)
GLUCOSE UR QL STRIP: NEGATIVE
HBA1C MFR BLD: 5.4 % (ref 4.8–5.6)
HCT VFR BLD AUTO: 39.7 % (ref 34–46.6)
HDLC SERPL-MCNC: 57 MG/DL
HGB BLD-MCNC: 13.5 G/DL (ref 11.1–15.9)
HGB UR QL STRIP: NEGATIVE
IMM GRANULOCYTES # BLD AUTO: 0 X10E3/UL (ref 0–0.1)
IMM GRANULOCYTES NFR BLD AUTO: 0 %
KETONES UR QL STRIP: NEGATIVE
LDLC SERPL CALC-MCNC: 93 MG/DL (ref 0–99)
LEUKOCYTE ESTERASE UR QL STRIP: NEGATIVE
LYMPHOCYTES # BLD AUTO: 2.2 X10E3/UL (ref 0.7–3.1)
LYMPHOCYTES NFR BLD AUTO: 29 %
MCH RBC QN AUTO: 33.8 PG (ref 26.6–33)
MCHC RBC AUTO-ENTMCNC: 34 G/DL (ref 31.5–35.7)
MCV RBC AUTO: 100 FL (ref 79–97)
MICRO URNS: NORMAL
MONOCYTES # BLD AUTO: 0.6 X10E3/UL (ref 0.1–0.9)
MONOCYTES NFR BLD AUTO: 9 %
N GONORRHOEA RRNA SPEC QL NAA+PROBE: NEGATIVE
NEUTROPHILS # BLD AUTO: 4.4 X10E3/UL (ref 1.4–7)
NEUTROPHILS NFR BLD AUTO: 60 %
NITRITE UR QL STRIP: NEGATIVE
PH UR STRIP: 5.5 [PH] (ref 5–7.5)
PLATELET # BLD AUTO: 279 X10E3/UL (ref 150–450)
POTASSIUM SERPL-SCNC: 4.2 MMOL/L (ref 3.5–5.2)
PROT SERPL-MCNC: 7 G/DL (ref 6–8.5)
PROT UR QL STRIP: NORMAL
RBC # BLD AUTO: 3.99 X10E6/UL (ref 3.77–5.28)
SODIUM SERPL-SCNC: 138 MMOL/L (ref 134–144)
SP GR UR STRIP: 1.02 (ref 1–1.03)
T VAGINALIS RRNA SPEC QL NAA+PROBE: NEGATIVE
T4 FREE SERPL-MCNC: 0.96 NG/DL (ref 0.82–1.77)
TRIGL SERPL-MCNC: 135 MG/DL (ref 0–149)
TSH SERPL DL<=0.005 MIU/L-ACNC: 1.58 UIU/ML (ref 0.45–4.5)
UROBILINOGEN UR STRIP-MCNC: 0.2 MG/DL (ref 0.2–1)
VLDLC SERPL CALC-MCNC: 24 MG/DL (ref 5–40)
WBC # BLD AUTO: 7.3 X10E3/UL (ref 3.4–10.8)

## 2025-02-24 ENCOUNTER — OFFICE VISIT (OUTPATIENT)
Dept: INTERNAL MEDICINE | Age: 45
End: 2025-02-24
Payer: COMMERCIAL

## 2025-02-24 VITALS
HEIGHT: 64 IN | OXYGEN SATURATION: 98 % | WEIGHT: 288 LBS | HEART RATE: 81 BPM | TEMPERATURE: 98 F | BODY MASS INDEX: 49.17 KG/M2 | SYSTOLIC BLOOD PRESSURE: 132 MMHG | DIASTOLIC BLOOD PRESSURE: 80 MMHG

## 2025-02-24 DIAGNOSIS — L73.9 FOLLICULITIS: Primary | ICD-10-CM

## 2025-02-24 PROCEDURE — 99213 OFFICE O/P EST LOW 20 MIN: CPT | Performed by: PHYSICIAN ASSISTANT

## 2025-02-24 RX ORDER — DOXYCYCLINE HYCLATE 100 MG
100 TABLET ORAL 2 TIMES DAILY
Qty: 14 TABLET | Refills: 0 | Status: SHIPPED | OUTPATIENT
Start: 2025-02-24 | End: 2025-03-03

## 2025-02-24 NOTE — PROGRESS NOTES
"                            MINAL TAI PA-C                  I  N  T  E  R  N  A  L    M  E  D  I  C  I  N  E         ENCOUNTER DATE:  02/24/2025    Brittany Sheppard / 44 y.o. / female    OFFICE VISIT ENCOUNTER       CHIEF COMPLAINT / REASON FOR OFFICE VISIT     Mass (Pt states it has been there for 3 weeks but began to get larger over the weekend.)      ASSESSMENT & PLAN     Problem List Items Addressed This Visit    None  Visit Diagnoses       Folliculitis    -  Primary    Relevant Medications    doxycycline (VIBRAMYICN) 100 MG tablet          No orders of the defined types were placed in this encounter.    New Medications Ordered This Visit   Medications    doxycycline (VIBRAMYICN) 100 MG tablet     Sig: Take 1 tablet by mouth 2 (Two) Times a Day for 7 days.     Dispense:  14 tablet     Refill:  0       SUMMARY/DISCUSSION  Folliculitis to lower central abdomen inferior to belt line.  Start doxycycline 100 mg twice daily x 7 days due to firmness of presentation.  Follow-up in office in 1 week to evaluate for possible I&D if area has not healed.        Next Appointment:   Return in about 1 week (around 3/3/2025) for Recheck folliculitis.      VITAL SIGNS     Vitals:    02/24/25 1331   BP: 132/80   Pulse: 81   Temp: 98 °F (36.7 °C)   SpO2: 98%   Weight: 131 kg (288 lb)   Height: 162.6 cm (64\")       BP Readings from Last 3 Encounters:   02/24/25 132/80   11/21/24 142/64   10/29/24 108/71     Wt Readings from Last 3 Encounters:   02/24/25 131 kg (288 lb)   01/09/25 128 kg (282 lb 3.2 oz)   11/21/24 128 kg (283 lb)     Body mass index is 49.44 kg/m².         No data to display                   MEDICATIONS AT THE TIME OF OFFICE VISIT     Current Outpatient Medications on File Prior to Visit   Medication Sig    albuterol sulfate HFA (Ventolin HFA) 108 (90 Base) MCG/ACT inhaler Inhale 2 puffs Every 4 (Four) Hours As Needed for Wheezing or Shortness of Air.    atomoxetine (Strattera) 60 MG capsule Take 1 capsule " "by mouth Daily.    gabapentin (Neurontin) 300 MG capsule Take 1 capsule by mouth 3 (Three) Times a Day.    hydroxychloroquine (PLAQUENIL) 200 MG tablet Take 1 tablet by mouth twice daily.    hydrOXYzine pamoate (Vistaril) 50 MG capsule Take 1-2 capsules by mouth 3 (Three) Times a Day As Needed for Sleep/Anxiety    Lumateperone Tosylate 21 MG capsule Take 1 capsule by mouth Every Night.    Melatonin 10 MG tablet Take 1 tablet by mouth Every Night.    metoprolol succinate XL (TOPROL-XL) 100 MG 24 hr tablet Take 1 tablet by mouth Daily.    sertraline (Zoloft) 100 MG tablet Take 2 tablets by mouth Daily.    valACYclovir (Valtrex) 500 MG tablet Take 1 tablet by mouth 2 (Two) Times a Day.    [DISCONTINUED] calcium carbonate (TUMS) 500 MG chewable tablet Chew 1 tablet As Needed for Indigestion or Heartburn.     No current facility-administered medications on file prior to visit.          HISTORY OF PRESENT ILLNESS     Reports a folliculitis that began to her lower abdomen on or around 3 weeks ago. The area became increasing enlarged, increasingly painful, and darker than the surrounding tissue.  She reports that the pain is stabbing in sensation and compares it to \"a bunch of needles \"when touched.  Pain does not radiate.  She has attempted a hot compress. She denies trauma, injury, bleeding, or drainage at the site. She has a history of folliculitis or boils to various areas of her skin surface.     Patient Care Team:  Jane Buenrostro APRN as PCP - General (Family Medicine)  Liila Joshua PA-C as Physician Assistant (Colon and Rectal Surgery)  Isha Vickers MD as Consulting Physician (Psychiatry)    REVIEW OF SYSTEMS     Review of Systems   As Per HPI.  All other systems negative.     PHYSICAL EXAMINATION     Physical Exam  Vitals and nursing note reviewed.   Constitutional:       General: She is not in acute distress.     Appearance: Normal appearance. She is morbidly obese. She is not ill-appearing. "   Cardiovascular:      Rate and Rhythm: Normal rate and regular rhythm.      Pulses: Normal pulses.      Heart sounds: Normal heart sounds. No murmur heard.     No friction rub. No gallop.   Pulmonary:      Effort: Pulmonary effort is normal. No respiratory distress.      Breath sounds: Normal breath sounds. No stridor. No wheezing.   Skin:         Neurological:      Mental Status: She is alert.   Psychiatric:         Mood and Affect: Mood normal.         Behavior: Behavior normal.             REVIEWED DATA     Labs:     Lab Results   Component Value Date     02/12/2025    K 4.2 02/12/2025    CALCIUM 9.1 02/12/2025    AST 16 02/12/2025    ALT 13 02/12/2025    BUN 13 02/12/2025    CREATININE 0.81 02/12/2025    CREATININE CANCELED 02/12/2025    CREATININE 0.77 09/27/2024     Lab Results   Component Value Date    HGBA1C 5.4 02/12/2025    HGBA1C CANCELED 02/12/2025    HGBA1C 4.90 02/15/2024     Lab Results   Component Value Date    LDL 93 02/12/2025    LDL 94 08/23/2023    LDL 87 07/27/2022    HDL 57 02/12/2025    HDL CANCELED 02/12/2025    TRIG 135 02/12/2025    TRIG CANCELED 02/12/2025     Lab Results   Component Value Date    TSH 1.580 02/12/2025    TSH CANCELED 02/12/2025    TSH 1.200 02/15/2024    FREET4 0.96 02/12/2025    FREET4 CANCELED 02/12/2025    FREET4 1.03 02/15/2024     Lab Results   Component Value Date    WBC 7.3 02/12/2025    HGB 13.5 02/12/2025     02/12/2025     Lab Results   Component Value Date    MALBCRERATIO 7 02/12/2025             Imaging:               Medical Tests:               Summary of old records / correspondence / consultant report:             Request outside records:

## 2025-02-27 ENCOUNTER — HOSPITAL ENCOUNTER (OUTPATIENT)
Facility: HOSPITAL | Age: 45
Discharge: HOME | End: 2025-02-27
Admitting: ORTHOPAEDIC SURGERY
Payer: COMMERCIAL

## 2025-02-27 DIAGNOSIS — M89.9 BONE LESION: ICD-10-CM

## 2025-02-27 PROCEDURE — 73721 MRI JNT OF LWR EXTRE W/O DYE: CPT

## 2025-03-06 ENCOUNTER — OFFICE VISIT (OUTPATIENT)
Dept: PSYCHIATRY | Facility: CLINIC | Age: 45
End: 2025-03-06
Payer: COMMERCIAL

## 2025-03-06 ENCOUNTER — TELEPHONE (OUTPATIENT)
Dept: ORTHOPEDIC SURGERY | Facility: CLINIC | Age: 45
End: 2025-03-06
Payer: COMMERCIAL

## 2025-03-06 DIAGNOSIS — F41.1 GENERALIZED ANXIETY DISORDER: Chronic | ICD-10-CM

## 2025-03-06 DIAGNOSIS — F31.32 BIPOLAR 1 DISORDER, DEPRESSED, MODERATE: Primary | Chronic | ICD-10-CM

## 2025-03-06 DIAGNOSIS — F90.0 ADHD, PREDOMINANTLY INATTENTIVE TYPE: Chronic | ICD-10-CM

## 2025-03-06 RX ORDER — ATOMOXETINE 80 MG/1
80 CAPSULE ORAL DAILY
Qty: 90 CAPSULE | Refills: 1 | Status: SHIPPED | OUTPATIENT
Start: 2025-03-06

## 2025-03-06 RX ORDER — SERTRALINE HYDROCHLORIDE 100 MG/1
100 TABLET, FILM COATED ORAL DAILY
Qty: 90 TABLET | Refills: 1 | Status: SHIPPED | OUTPATIENT
Start: 2025-03-06

## 2025-03-06 NOTE — TELEPHONE ENCOUNTER
Called patient went over results of the MRI.  Instructed her to follow-up in 1 year with continue annual exams as needed she expressed understanding.  If pain changes during interim she will let us know.

## 2025-03-06 NOTE — PROGRESS NOTES
"Subjective   Harriskp Sheppard is a 44 y.o. 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   Finished HS in regular classes  The pt was disruptive in school, daydreaming, talking to other students, could not sit still, was always fidgety, moving, procrastination, missing deadlines, \"last minute person\"    The pt experienced similar issues at home, interrupting people, could not wait for her turn to talk   The pt was dsd with ADHD as a child, she was on meds but mother did not like her behavior on meds   In [id number] - the pt noticed mood swings, getting angry, argumentative with co-workers   At that time she did IOP at the [facility]   Last manic episodes - 2 weeks ago (impulsive spending, missing work, multiple projects, feeling invincible, pressured speech) that lasted 6-7 days   Depression 7/10, every day, all day long , associated with decreased E level., poor sleep, poor motivations, guilt feelings, denied AVH/Si/HI, no elicited delusions  Anxiety - intense and persistent   Few panic attacks   Alc 1 pint 2-3 times per week     Last visit - cont  caplyta , sertraline, gabapentin and started on strattera     Today the pt reported feeling less  depressed, less irritable after separation from her , she even decreased sertraline to 100 mg and mood is still stable   Her manager at work noticed positive changes  in her mood and attitude  Depression 4-5/10 on average   The pt found caplyta to be effective and it is affordable, still has poor sleep    Denied AVH/SI/HI  Sleep - fragmented, taking trazodone PRN only   Decreased concentration since school, was on meds at that time  Now mood is stable and still has concentration deficits that affect her functioning   Strattera is effective, can be increased  " "      The following portions of the patient's history were reviewed and updated as appropriate: allergies, current medications, past family history, past medical history, past social history, past surgical history and problem list.    PAST PSYCHIATRIC HISTORY  [place]  [place] inpt  + IOP at [facility] in 2010 2ry to anger issues  and was dsd with bipolar d/o   No SI,no attempts   [place] II  Defer     PAST OUTPATIENT TREATMENT  Diagnosis treated:  Affective Disorder, Anxiety/Panic Disorder  Treatment Type:  Medication Management  Dr Khan - psych on [facility] and worked with therapist there   Prior Psychiatric Medications:  Vraylar - tremor  Wellbutrin XL - helped her quit smoking, initially thought it was making her angry  Abilify - insomnia  Atarax - helps with sleep onset  Elavil - diaphoresis, RLS  Seroquel - diaphoresis, gaining weight, prescribed for sleep  Effexor - diaphoresis  Cymbalta - diaphoresis  Celexa - some benefit; was on it for six months and stopped taking it because it made her \"numb\"  Ambien - \"crazy stuff\" at night time  Trazodone - not effective   Campral - GI side effects, dry mouth  Buspar - did not notice a difference on 10 mg PO BID    LABORATORY - SCAN - [facility] RESULTS, [facility], 08/13/2024 (08/13/2024)       Support Groups:  Alcoholica Anonymous (AA)  The pt has a sponsor   Sequelae Of Mental Disorder:  job disruption, social isolation, emotional distress      Interval History  Improved     Side Effects  Denied       Past Medical History:  Past Medical History:   Diagnosis Date    Acute recurrent frontal sinusitis 09/01/2022    Anemia     Anxiety     Arthritis     Asthma     Back pain     Bipolar 1 disorder     COPD (chronic obstructive pulmonary disease)     Depression     Dyspnea     Elevated liver enzymes     GERD (gastroesophageal reflux disease)     H. pylori infection     treated 8/2016    Hepatitis C 03/2015    harvoni completed; attributed to tatoo    Hirsutism     History of " COVID-19     History of migraine headaches     with scotomata    HPV in female 2021    HPV in female 2014    HPV in female 10/2015    Hypertension     IBS (irritable bowel syndrome)     Insomnia     LGSIL on Pap smear of cervix 2016    cx biopsy confoirmed LGSIL    Morbid obesity with body mass index of 50.0-59.9 in adult 2014    Obesity     PCOS (polycystic ovarian syndrome)     PCR positive for herpes simplex virus type 1 (HSV-1) DNA     PONV (postoperative nausea and vomiting)     Preop cardiovascular exam 10/10/2014    Sleep apnea     HAS A C-PAP MACHINE    Steatosis of liver     liver biopsy; moderate; stage 3, grade 0    Vitamin D deficiency        Social History:  Social History     Socioeconomic History    Marital status:     Number of children: 0   Tobacco Use    Smoking status: Former     Current packs/day: 0.00     Types: Cigarettes     Quit date:      Years since quittin.2     Passive exposure: Never    Smokeless tobacco: Never   Vaping Use    Vaping status: Never Used   Substance and Sexual Activity    Alcohol use: Yes     Comment: off and on- won't drink for weeks and then will have a week where she drinks several days    Drug use: Never    Sexual activity: Defer     Partners: Male, Female     Alc -up to 1 pint of cognac 2-3 times per week   No DUI/PI  Few blackouts  Unable to control amount of consumed alc   The pt just came out of detox program for 8 days at [facility]   No hx of DT, no SZ   Some cravings         Family History:  Family History   Problem Relation Age of Onset    Hypertension Mother     Obesity Mother     Diabetes Mother     Sleep apnea Mother     Bipolar disorder Father     Diabetes Father     Hypertension Father     Migraines Father     Alcohol abuse Father     Stroke Maternal Grandmother     Breast cancer Maternal Grandmother 72    Diabetes Maternal Grandmother     Hypertension Maternal Grandmother     Stroke Maternal Grandfather     Cancer Maternal  Grandfather     Stroke Paternal Grandmother     Breast cancer Paternal Grandmother     Hypertension Paternal Grandmother     Pancreatic cancer Paternal Grandmother     Schizophrenia Nephew     Bipolar disorder Nephew     Malig Hyperthermia Neg Hx        Past Surgical History:  Past Surgical History:   Procedure Laterality Date    APPENDECTOMY  07/19/2010    [facility]    BREAST SURGERY      I & D of abcess X3    CYST REMOVAL  09/2008    D & C HYSTEROSCOPY ENDOMETRIAL ABLATION  12/16/2010    menorrhagia; pathology benign    ENDOSCOPY N/A 10/29/2024    Procedure: ESOPHAGOGASTRODUODENOSCOPY WITH BIOPSY;  Surgeon: Melchor Duke Jr., MD;  Location: [facility] ENDOSCOPY;  Service: General;  Laterality: N/A;  PRE- DYSPEPSIA, H/O BAND REMOVAL  POST- GASTRITIS    LAPAROSCOPIC CHOLECYSTECTOMY      LAPAROSCOPIC GASTRIC BANDING  03/27/2017    Dr. Prieto Colon [facility]    LIVER BIOPSY      POLYPECTOMY      TOOTH EXTRACTION  2016    WISDOM TOOTH EXTRACTION  2012       Problem List:  Patient Active Problem List   Diagnosis    Abscess of axilla, left    Lumbago    Acute hepatitis C without hepatic coma    Generalized anxiety disorder    Asthma    Atopic contact dermatitis    Primary hypertension    Breast abscess of female    Cervical high risk HPV (human papillomavirus) test positive    Chondromalacia of right patella    Chronic pain of right knee    Gastroesophageal reflux disease    Hamstring tightness of right lower extremity    History of removal of laparoscopic gastric banding device    History of smoking    HSV-2 seropositive    Intractable chronic migraine without aura and without status migrainosus    LGSIL (low grade squamous intraepithelial dysplasia)    Patellofemoral syndrome of both knees    Pes anserinus bursitis of right knee    Primary osteoarthritis of both knees    Pruritic rash    Sepsis    Vitamin D deficiency    Osteoarthritis of multiple joints    Acute cystitis with hematuria    Generalized abdominal pain    KENDRA  on CPAP    Bipolar 1 disorder, depressed, moderate    Alcohol use disorder, moderate, in early remission    Obesity, Class III, BMI 40-49.9 (morbid obesity)    Dietary counseling    Pre-op evaluation    Fatty liver    PCOS (polycystic ovarian syndrome)    Fibromyalgia    Left knee pain    ADHD, predominantly inattentive type       Allergy:   Allergies   Allergen Reactions    Cephalexin Hives    Penicillins Anaphylaxis and Hives    Prunus Persica Anaphylaxis    Cefaclor Hives    Morphine Hives and Itching    Adhesive Tape Other (See Comments)     Pulls skin off    Codeine Hives    Latex Hives and Rash     Hives and anaphlaxic          Discontinued Medications:  Medications Discontinued During This Encounter   Medication Reason    sertraline (Zoloft) 100 MG tablet Reorder    atomoxetine (Strattera) 60 MG capsule Reorder             Current Medications:   Current Outpatient Medications   Medication Sig Dispense Refill    atomoxetine (Strattera) 80 MG capsule Take 1 capsule by mouth Daily. 90 capsule 1    sertraline (Zoloft) 100 MG tablet Take 1 tablet by mouth Daily. 90 tablet 1    albuterol sulfate HFA (Ventolin HFA) 108 (90 Base) MCG/ACT inhaler Inhale 2 puffs Every 4 (Four) Hours As Needed for Wheezing or Shortness of Air. 18 g 3    gabapentin (Neurontin) 300 MG capsule Take 1 capsule by mouth 3 (Three) Times a Day. 90 capsule 2    hydroxychloroquine (PLAQUENIL) 200 MG tablet Take 1 tablet by mouth twice daily. 60 tablet 3    hydrOXYzine pamoate (Vistaril) 50 MG capsule Take 1-2 capsules by mouth 3 (Three) Times a Day As Needed for Sleep/Anxiety 90 capsule 1    Lumateperone Tosylate 21 MG capsule Take 1 capsule by mouth Every Night. 30 capsule 2    Lumateperone Tosylate 42 MG capsule Take 1 capsule by mouth Every Night. 21 capsule 0    Melatonin 10 MG tablet Take 1 tablet by mouth Every Night.      metoprolol succinate XL (TOPROL-XL) 100 MG 24 hr tablet Take 1 tablet by mouth Daily. 90 tablet 1    valACYclovir  (Valtrex) 500 MG tablet Take 1 tablet by mouth 2 (Two) Times a Day. 90 tablet 3     No current facility-administered medications for this visit.         Psychological ROS: positive for - anxiety, concentration difficulties, depression, irritability, and mood swings  negative for - hallucinations or suicidal ideation      Physical Exam:   not currently breastfeeding.    Mental Status Exam:   Hygiene:   good  Cooperation:  Cooperative  Eye Contact:  Good  Psychomotor Behavior:  Appropriate  Affect:  Appropriate  Mood: euthymic and fluctates  Hopelessness: Denies  Speech:  Normal  Thought Process:  Goal directed and Linear  Thought Content:  Mood congruent  Suicidal:  None  Homicidal:  None  Hallucinations:  None  Delusion:  None  Memory:  Intact  Orientation:  Person, Place, Time, and Situation  Reliability:  fair  Insight:  Fair  Judgement:  Good  Impulse Control:  Fair  Physical/Medical Issues:  Yes        MSE from 1/2/25  reviewed and accepted without changes     PHQ-9 Depression Screening  Little interest or pleasure in doing things? Over half   Feeling down, depressed, or hopeless? Several days   PHQ-2 Total Score 3   Trouble falling or staying asleep, or sleeping too much? Over half   Feeling tired or having little energy? Several days   Poor appetite or overeating? Not at all   Feeling bad about yourself - or that you are a failure or have let yourself or your family down? Several days   Trouble concentrating on things, such as reading the newspaper or watching television? Several days   Moving or speaking so slowly that other people could have noticed? Or the opposite - being so fidgety or restless that you have been moving around a lot more than usual? Not at all   Thoughts that you would be better off dead, or of hurting yourself in some way? Not at all   PHQ-9 Total Score 8   If you checked off any problems, how difficult have these problems made it for you to do your work, take care of things at home, or  get along with other people? Very difficult         Over the last two weeks, how often have you been bothered by the following problems?  Feeling nervous, anxious or on edge: More than half the days  Not being able to stop or control worrying: More than half the days  Worrying too much about different things: More than half the days  Trouble Relaxing: Several days  Being so restless that it is hard to sit still: Several days  Becoming easily annoyed or irritable: More than half the days  Feeling afraid as if something awful might happen: More than half the days  ALYX 7 Total Score: 12  If you checked any problems, how difficult have these problems made it for you to do your work, take care of things at home, or get along with other people: Extremely difficult      Former smoker    I advised Brittany of the risks of tobacco use.     Lab Results:   Results Encounter on 12/28/2024   Component Date Value Ref Range Status    WBC 02/12/2025 CANCELED  x10E3/uL Final-Edited    Comment: Please refer to the following specimen for additional lab results.  SEE: [id number]    Result canceled by the ancillary.      RBC 02/12/2025 CANCELED   Final-Edited    Comment: Test not performed    Result canceled by the ancillary.      Hemoglobin 02/12/2025 CANCELED   Final-Edited    Comment: Test not performed    Result canceled by the ancillary.      Hematocrit 02/12/2025 CANCELED   Final-Edited    Comment: Test not performed    Result canceled by the ancillary.      Platelets 02/12/2025 CANCELED   Final-Edited    Comment: Test not performed    Result canceled by the ancillary.      Neutrophil Rel % 02/12/2025 CANCELED   Final-Edited    Comment: Test not performed    Result canceled by the ancillary.      Lymphocyte Rel % 02/12/2025 CANCELED   Final-Edited    Comment: Test not performed    Result canceled by the ancillary.      Monocyte Rel % 02/12/2025 CANCELED   Final-Edited    Comment: Test not performed    Result canceled by the  ancillary.      Eosinophil Rel % 02/12/2025 CANCELED   Final-Edited    Comment: Test not performed    Result canceled by the ancillary.      Lymphocytes Absolute 02/12/2025 CANCELED   Final-Edited    Comment: Test not performed    Result canceled by the ancillary.      Eosinophils Absolute 02/12/2025 CANCELED   Final-Edited    Comment: Test not performed    Result canceled by the ancillary.      Basophils Absolute 02/12/2025 CANCELED   Final-Edited    Comment: Test not performed    Result canceled by the ancillary.      Glucose 02/12/2025 CANCELED  mg/dL Final-Edited    Comment: Please refer to the following specimen for additional lab results.  SEE: [id number]    Result canceled by the ancillary.      BUN 02/12/2025 CANCELED   Final-Edited    Comment: Test not performed    Result canceled by the ancillary.      Creatinine 02/12/2025 CANCELED   Final-Edited    Comment: Test not performed    Result canceled by the ancillary.      Sodium 02/12/2025 CANCELED   Final-Edited    Comment: Test not performed    Result canceled by the ancillary.      Potassium 02/12/2025 CANCELED   Final-Edited    Comment: Test not performed    Result canceled by the ancillary.      Chloride 02/12/2025 CANCELED   Final-Edited    Comment: Test not performed    Result canceled by the ancillary.      Total CO2 02/12/2025 CANCELED   Final-Edited    Comment: Test not performed    Result canceled by the ancillary.      Calcium 02/12/2025 CANCELED   Final-Edited    Comment: Test not performed    Result canceled by the ancillary.      Total Protein 02/12/2025 CANCELED   Final-Edited    Comment: Test not performed    Result canceled by the ancillary.      Albumin 02/12/2025 CANCELED   Final-Edited    Comment: Test not performed    Result canceled by the ancillary.      Total Bilirubin 02/12/2025 CANCELED   Final-Edited    Comment: Test not performed    Result canceled by the ancillary.      Alkaline Phosphatase 02/12/2025 CANCELED   Final-Edited     Comment: Test not performed    Result canceled by the ancillary.      AST (SGOT) 02/12/2025 CANCELED   Final-Edited    Comment: Test not performed    Result canceled by the ancillary.      ALT (SGPT) 02/12/2025 CANCELED   Final-Edited    Comment: Test not performed    Result canceled by the ancillary.      Hemoglobin A1C 02/12/2025 CANCELED  % Final-Edited    Comment: Please refer to the following specimen for additional lab results.  SEE: [id number]           Prediabetes: 5.7 - 6.4           Diabetes: >6.4           Glycemic control for adults with diabetes: <7.0    Result canceled by the ancillary.      Total Cholesterol 02/12/2025 CANCELED  mg/dL Final-Edited    Comment: Please refer to the following specimen for additional lab results.  SEE: [id number]    Result canceled by the ancillary.      Triglycerides 02/12/2025 CANCELED   Final-Edited    Comment: Test not performed    Result canceled by the ancillary.      HDL Cholesterol 02/12/2025 CANCELED   Final-Edited    Comment: Test not performed    Result canceled by the ancillary.      VLDL Cholesterol [place] 02/12/2025 CANCELED  mg/dL Final-Edited    Comment: Unable to calculate result since non-numeric result obtained for  component test.    Result canceled by the ancillary.      Creatinine, Urine 02/12/2025 239.0  Not Estab. mg/dL Final    Microalbumin, Urine 02/12/2025 17.5  Not Estab. ug/mL Final    Microalbumin/Creatinine Ratio 02/12/2025 7  0 - 29 mg/g creat Final    Comment:                        Normal:                0 -  29                         Moderately increased: 30 - 300                         Severely increased:       >300      TSH 02/12/2025 CANCELED  uIU/mL Final-Edited    Comment: Please refer to the following specimen for additional lab results.  SEE: [id number]    Result canceled by the ancillary.      Free T4 02/12/2025 CANCELED   Final-Edited    Comment: Test not performed    Result canceled by the ancillary.      Specimen  Status 02/12/2025 CANCELED   Final-Edited    Comment: Please refer to the following specimen for additional lab results.        TEST:  [id number]  TSH+Free T4               [id number]  CBC With Differential/Platelet               [id number]  Comp. Metabolic Panel (14)               [id number]  Lipid Panel w/ Chol/HDL Ratio               [id number]  Hemoglobin A1c  SEE: [id number]    Result canceled by the ancillary.         Assessment & Plan   Problems Addressed this Visit       Generalized anxiety disorder (Chronic)    Relevant Medications    sertraline (Zoloft) 100 MG tablet    Lumateperone Tosylate 42 MG capsule    atomoxetine (Strattera) 80 MG capsule    Bipolar 1 disorder, depressed, moderate - Primary    Relevant Medications    sertraline (Zoloft) 100 MG tablet    Lumateperone Tosylate 42 MG capsule    atomoxetine (Strattera) 80 MG capsule    ADHD, predominantly inattentive type (Chronic)    Relevant Medications    sertraline (Zoloft) 100 MG tablet    Lumateperone Tosylate 42 MG capsule    atomoxetine (Strattera) 80 MG capsule     Diagnoses         Codes Comments    Bipolar 1 disorder, depressed, moderate    -  Primary ICD-10-CM: F31.32  ICD-9-CM: 296.52     ADHD, predominantly inattentive type     ICD-10-CM: F90.0  ICD-9-CM: 314.00     Generalized anxiety disorder     ICD-10-CM: F41.1  ICD-9-CM: 300.02             Visit Diagnoses:    ICD-10-CM ICD-9-CM   1. Bipolar 1 disorder, depressed, moderate  F31.32 296.52   2. ADHD, predominantly inattentive type  F90.0 314.00   3. Generalized anxiety disorder  F41.1 300.02               TREATMENT PLAN/GOALS: Continue supportive psychotherapy efforts and medications as indicated. Treatment and medication options discussed during today's visit. Patient ackowledged and verbally consented to continue with current treatment plan and was educated on the importance of compliance with treatment and follow-up appointments.    MEDICATION ISSUES:  INSPECT reviewed as expected - 12/26/23  hydrocodone # 18 for 3 days, gabapentin 2/4/25      PHQ scored 8 - mild depression   ALYX 7 scored 12 moderate anxiety   MDQ scored 13 (bipolar likely)  Patient screened positive for depression based on a PHQ-9 score of 8 on 3/6/2025. Follow-up recommendations include:  mood stabilizers .     Bipolar d/o, MRE depressed , moderate - increase caplyta to 42 mg - 3 weeks trial, if sedation interferes with functioning - back on 21 mg , the pt will call in 2-3 weeks   ALYX - cont gabapentin  300 mg po TID for anxiety,    cravings and  fibromyalgia , sertraline 100 mg po QAM    ADHD  combined  -increase   strattera to 80  mg po QAM     Genesight discussed - N folic acid conversion, caplyta,sertraline  - use as directed      Short term goals - to  keep working on therapeutic rapport, to stay compliant with meds   Long term goals - to improve sxs , to stay at optimal level of functioning     The pt appeared to be stable for bariatric procedure     Discussed medication options and treatment plan of prescribed medication as well as the risks, benefits, and side effects including potential falls, possible impaired driving and metabolic adversities among others. Patient is agreeable to call the office with any worsening of symptoms or onset of side effects. Patient is agreeable to call 911 or go to the nearest ER should he/she begin having SI/HI. No medication side effects or related complaints today.     MEDS ORDERED DURING VISIT:  New Medications Ordered This Visit   Medications    sertraline (Zoloft) 100 MG tablet     Sig: Take 1 tablet by mouth Daily.     Dispense:  90 tablet     Refill:  1    Lumateperone Tosylate 42 MG capsule     Sig: Take 1 capsule by mouth Every Night.     Dispense:  21 capsule     Refill:  0    atomoxetine (Strattera) 80 MG capsule     Sig: Take 1 capsule by mouth Daily.     Dispense:  90 capsule     Refill:  1       Return in about 4 months (around 7/6/2025).           This document has been  electronically signed by Isha Vickers MD  March 6, 2025 11:00 EST    Part of this note may be an electronic transcription/translation of spoken language to printed text using the Dragon Dictation System.

## 2025-03-06 NOTE — TELEPHONE ENCOUNTER
Caller: Brittany Sheppard    Relationship to patient: Self    Best call back number: [phone]    Chief complaint: LEFT KNEE     Type of visit: MRI FOLLOW UP     Requested date: ASAP      If rescheduling, when is the original appointment: N/A      Additional notes:PATIENT HAD A LEFT KNEE MRI ON 2-27-25 (). DR BETTS ASKED THAT SHE CALL THE OFFICE TO SET UP A TELEHEALTH VISIT OF HE COULD CALL HE WITH THE RESULTS AS WELL. PLEASE ADVISE.

## 2025-04-03 ENCOUNTER — TELEPHONE (OUTPATIENT)
Dept: OBSTETRICS AND GYNECOLOGY | Facility: CLINIC | Age: 45
End: 2025-04-03
Payer: COMMERCIAL

## 2025-04-15 DIAGNOSIS — Z11.1 SCREENING-PULMONARY TB: Primary | ICD-10-CM

## 2025-04-17 LAB
GAMMA INTERFERON BACKGROUND BLD IA-ACNC: 0.03 IU/ML
M TB IFN-G BLD-IMP: NEGATIVE
M TB IFN-G CD4+ BCKGRND COR BLD-ACNC: 0.03 IU/ML
M TB IFN-G CD4+CD8+ BCKGRND COR BLD-ACNC: 0.04 IU/ML
MITOGEN IGNF BCKGRD COR BLD-ACNC: >10 IU/ML
QUANTIFERON INCUBATION: NORMAL
SERVICE CMNT-IMP: NORMAL

## 2025-04-19 DIAGNOSIS — I10 PRIMARY HYPERTENSION: ICD-10-CM

## 2025-04-23 RX ORDER — METOPROLOL SUCCINATE 100 MG/1
100 TABLET, EXTENDED RELEASE ORAL DAILY
Qty: 90 TABLET | Refills: 1 | Status: CANCELLED | OUTPATIENT
Start: 2025-04-23

## 2025-04-24 ENCOUNTER — TELEPHONE (OUTPATIENT)
Dept: PSYCHIATRY | Facility: CLINIC | Age: 45
End: 2025-04-24
Payer: COMMERCIAL

## 2025-04-24 DIAGNOSIS — I10 PRIMARY HYPERTENSION: ICD-10-CM

## 2025-04-24 RX ORDER — METOPROLOL SUCCINATE 100 MG/1
100 TABLET, EXTENDED RELEASE ORAL DAILY
Qty: 90 TABLET | Refills: 1 | Status: CANCELLED | OUTPATIENT
Start: 2025-04-24

## 2025-04-24 NOTE — TELEPHONE ENCOUNTER
Patient reports she is trying to schedule surgery.  Dr. Mary Aldrich is needing has some questions and is needing clarification.  Requesting phone call from Dr. Vickers.  Dr. Aldrich's phone number is [phone].

## 2025-04-25 DIAGNOSIS — I10 PRIMARY HYPERTENSION: ICD-10-CM

## 2025-04-25 RX ORDER — METOPROLOL SUCCINATE 100 MG/1
100 TABLET, EXTENDED RELEASE ORAL DAILY
Qty: 90 TABLET | Refills: 1 | Status: CANCELLED | OUTPATIENT
Start: 2025-04-25

## 2025-05-07 ENCOUNTER — TELEPHONE (OUTPATIENT)
Dept: PSYCHIATRY | Facility: CLINIC | Age: 45
End: 2025-05-07
Payer: COMMERCIAL

## 2025-05-07 DIAGNOSIS — I10 PRIMARY HYPERTENSION: ICD-10-CM

## 2025-05-07 NOTE — TELEPHONE ENCOUNTER
Please type the letter stating the pt was stable on current medications for 1 year, clean and sober for 1.5 years     Thank you

## 2025-05-07 NOTE — TELEPHONE ENCOUNTER
Brittany called stating that since you and Dr Aldrich have not been able to speak, he is asking for a letter with clarity of time frame of her Rehab and how long she has been clean, and current status of her depression and anxiety.     She will print it out of my chart.

## 2025-05-08 NOTE — PROGRESS NOTES
I N T E R N A L  M E D I C I N E  WILIANLOLLY MAGAÑA, APRN      ENCOUNTER DATE:  05/09/2025    Brittany Sheppard / 44 y.o. / female        You have chosen to receive care through a telehealth visit.  Do you consent to use a video/audio connection for your medical care today? Yes    Location of patient is in [place] at work and location of provider is in [place] at clinic      CHIEF COMPLAINT / REASON FOR OFFICE VISIT     TELEHEALTH ENCOUNTER:  Urinary Urgency      ASSESSMENT & PLAN     Diagnoses and all orders for this visit:    1. Urge incontinence of urine (Primary)  -     Ambulatory Referral to Gynecologic Urology  -     US urinary bladder; Future  -     Ambulatory Referral to Physical Therapy for Evaluation & Treatment  -     Urine Culture - Urine, Urine, Clean Catch    2. Urinary frequency  -     POC Urinalysis Dipstick  -     Ambulatory Referral to Gynecologic Urology  -     US urinary bladder; Future  -     Ambulatory Referral to Physical Therapy for Evaluation & Treatment  -     Urine Culture - Urine, Urine, Clean Catch          SUMMARY/DISCUSSION  Urinalysis without evidence of infection.  Suspect possible OAB, urge incontinence.  Recommend evaluation with bladder US, referral to UroGYN.  Agreeable to pelvic floor PT.  Return for non improving symptoms.        Time spent: 15 minutes    Next Appointment with me: 5/22/2025    Return for Next scheduled follow up.        HISTORY OF PRESENT ILLNESS     Symptoms started 6 months ago with urinary urgency, frequency.  Loses control of her bladder when waking up in the middle of the night and headed to visit bathroom.  No dysuria, hematuria.  Does have history of kidney stones, but no abdominal/ back/ flank pain, fever, chills.  Denies any possibility of pregnancy.  Has been trying to do Kegels.  Still has uterus.        REVIEWED DATA     Labs:           Imaging:           Medical Tests:           Summary of old records / correspondence / consultant report:            Request outside records:         Template created by Barak Aguirre MD

## 2025-05-09 ENCOUNTER — TELEPHONE (OUTPATIENT)
Dept: INTERNAL MEDICINE | Age: 45
End: 2025-05-09

## 2025-05-09 ENCOUNTER — TELEMEDICINE (OUTPATIENT)
Dept: INTERNAL MEDICINE | Age: 45
End: 2025-05-09
Payer: COMMERCIAL

## 2025-05-09 VITALS
HEART RATE: 98 BPM | OXYGEN SATURATION: 97 % | TEMPERATURE: 98.5 F | WEIGHT: 270 LBS | BODY MASS INDEX: 46.1 KG/M2 | HEIGHT: 64 IN

## 2025-05-09 DIAGNOSIS — N39.41 URGE INCONTINENCE OF URINE: Primary | ICD-10-CM

## 2025-05-09 DIAGNOSIS — R35.0 URINARY FREQUENCY: ICD-10-CM

## 2025-05-09 LAB
BILIRUB BLD-MCNC: ABNORMAL MG/DL
CLARITY, POC: CLEAR
COLOR UR: ABNORMAL
GLUCOSE UR STRIP-MCNC: NEGATIVE MG/DL
KETONES UR QL: NEGATIVE
LEUKOCYTE EST, POC: NEGATIVE
NITRITE UR-MCNC: NEGATIVE MG/ML
PH UR: 6 [PH] (ref 5–8)
PROT UR STRIP-MCNC: ABNORMAL MG/DL
RBC # UR STRIP: NEGATIVE /UL
SP GR UR: 1.02 (ref 1–1.03)
UROBILINOGEN UR QL: ABNORMAL

## 2025-05-09 RX ORDER — METOPROLOL SUCCINATE 100 MG/1
100 TABLET, EXTENDED RELEASE ORAL DAILY
Qty: 90 TABLET | Refills: 0 | Status: SHIPPED | OUTPATIENT
Start: 2025-05-09

## 2025-05-09 NOTE — TELEPHONE ENCOUNTER
Spoke with patient to let them know that she needs to leave a urine sample before visit.     Patient stated they already did it.

## 2025-05-10 LAB
BACTERIA UR CULT: NORMAL
BACTERIA UR CULT: NORMAL

## 2025-05-19 DIAGNOSIS — N39.41 URGE INCONTINENCE OF URINE: Primary | ICD-10-CM

## 2025-05-19 DIAGNOSIS — R35.0 URINARY FREQUENCY: ICD-10-CM

## 2025-05-22 ENCOUNTER — HOSPITAL ENCOUNTER (OUTPATIENT)
Dept: CARDIOLOGY | Facility: HOSPITAL | Age: 45
Discharge: HOME | End: 2025-05-22
Payer: COMMERCIAL

## 2025-05-22 ENCOUNTER — HOSPITAL ENCOUNTER (OUTPATIENT)
Dept: GENERAL RADIOLOGY | Facility: HOSPITAL | Age: 45
Discharge: HOME | End: 2025-05-22
Payer: COMMERCIAL

## 2025-05-22 DIAGNOSIS — E66.01 OBESITY, CLASS III, BMI 40-49.9 (MORBID OBESITY): ICD-10-CM

## 2025-05-22 DIAGNOSIS — Z01.818 PRE-OP EVALUATION: ICD-10-CM

## 2025-05-22 DIAGNOSIS — E28.2 PCOS (POLYCYSTIC OVARIAN SYNDROME): ICD-10-CM

## 2025-05-22 DIAGNOSIS — G47.33 OSA ON CPAP: ICD-10-CM

## 2025-05-22 DIAGNOSIS — I10 BENIGN HYPERTENSION: ICD-10-CM

## 2025-05-22 DIAGNOSIS — K21.9 GASTROESOPHAGEAL REFLUX DISEASE, UNSPECIFIED WHETHER ESOPHAGITIS PRESENT: ICD-10-CM

## 2025-05-22 PROCEDURE — 71046 X-RAY EXAM CHEST 2 VIEWS: CPT

## 2025-05-22 PROCEDURE — 93005 ELECTROCARDIOGRAM TRACING: CPT | Performed by: NURSE PRACTITIONER

## 2025-05-23 LAB
QT INTERVAL: 381 MS
QTC INTERVAL: 438 MS

## 2025-06-26 ENCOUNTER — HOSPITAL ENCOUNTER (OUTPATIENT)
Facility: HOSPITAL | Age: 45
Discharge: HOME | End: 2025-06-26
Payer: COMMERCIAL

## 2025-06-26 DIAGNOSIS — N39.41 URGE INCONTINENCE OF URINE: ICD-10-CM

## 2025-06-26 DIAGNOSIS — R35.0 URINARY FREQUENCY: ICD-10-CM

## 2025-06-26 PROCEDURE — 76857 US EXAM PELVIC LIMITED: CPT

## 2025-06-27 DIAGNOSIS — R93.89 ABNORMAL PELVIC ULTRASOUND: Primary | ICD-10-CM

## 2025-06-30 ENCOUNTER — TELEPHONE (OUTPATIENT)
Dept: BARIATRICS/WEIGHT MGMT | Facility: CLINIC | Age: 45
End: 2025-06-30
Payer: COMMERCIAL

## 2025-07-02 ENCOUNTER — PREP FOR SURGERY (OUTPATIENT)
Dept: OTHER | Facility: HOSPITAL | Age: 45
End: 2025-07-02
Payer: COMMERCIAL

## 2025-07-02 DIAGNOSIS — E66.813 OBESITY, CLASS III, BMI 40-49.9 (MORBID OBESITY): Primary | ICD-10-CM

## 2025-07-02 RX ORDER — GABAPENTIN 300 MG/1
600 CAPSULE ORAL ONCE
OUTPATIENT
Start: 2025-07-02 | End: 2025-07-02

## 2025-07-02 RX ORDER — METOCLOPRAMIDE HYDROCHLORIDE 5 MG/ML
10 INJECTION INTRAMUSCULAR; INTRAVENOUS ONCE
OUTPATIENT
Start: 2025-07-02 | End: 2025-07-02

## 2025-07-02 RX ORDER — SODIUM CHLORIDE 0.9 % (FLUSH) 0.9 %
3-10 SYRINGE (ML) INJECTION AS NEEDED
OUTPATIENT
Start: 2025-07-02

## 2025-07-02 RX ORDER — PANTOPRAZOLE SODIUM 40 MG/10ML
40 INJECTION, POWDER, LYOPHILIZED, FOR SOLUTION INTRAVENOUS ONCE
OUTPATIENT
Start: 2025-07-02 | End: 2025-07-02

## 2025-07-02 RX ORDER — SODIUM CHLORIDE 0.9 % (FLUSH) 0.9 %
3 SYRINGE (ML) INJECTION EVERY 12 HOURS SCHEDULED
OUTPATIENT
Start: 2025-07-02

## 2025-07-02 RX ORDER — CHLORHEXIDINE GLUCONATE ORAL RINSE 1.2 MG/ML
15 SOLUTION DENTAL SEE ADMIN INSTRUCTIONS
OUTPATIENT
Start: 2025-07-02

## 2025-07-02 RX ORDER — SODIUM CHLORIDE, SODIUM LACTATE, POTASSIUM CHLORIDE, CALCIUM CHLORIDE 600; 310; 30; 20 MG/100ML; MG/100ML; MG/100ML; MG/100ML
100 INJECTION, SOLUTION INTRAVENOUS CONTINUOUS
OUTPATIENT
Start: 2025-07-02 | End: 2025-07-03

## 2025-07-02 RX ORDER — SCOPOLAMINE 1 MG/3D
1 PATCH, EXTENDED RELEASE TRANSDERMAL CONTINUOUS
OUTPATIENT
Start: 2025-07-02 | End: 2025-07-05

## 2025-07-02 RX ORDER — SODIUM CHLORIDE 9 MG/ML
40 INJECTION, SOLUTION INTRAVENOUS AS NEEDED
OUTPATIENT
Start: 2025-07-02

## 2025-07-17 ENCOUNTER — OFFICE VISIT (OUTPATIENT)
Dept: INTERNAL MEDICINE | Age: 45
End: 2025-07-17
Payer: COMMERCIAL

## 2025-07-17 VITALS
HEIGHT: 64 IN | SYSTOLIC BLOOD PRESSURE: 145 MMHG | DIASTOLIC BLOOD PRESSURE: 89 MMHG | WEIGHT: 275 LBS | HEART RATE: 99 BPM | RESPIRATION RATE: 18 BRPM | BODY MASS INDEX: 46.95 KG/M2 | TEMPERATURE: 97.7 F | OXYGEN SATURATION: 100 %

## 2025-07-17 DIAGNOSIS — R59.9 SWELLING OF LYMPH NODES: ICD-10-CM

## 2025-07-17 DIAGNOSIS — I10 PRIMARY HYPERTENSION: Primary | ICD-10-CM

## 2025-07-17 PROCEDURE — 99214 OFFICE O/P EST MOD 30 MIN: CPT

## 2025-07-17 NOTE — PROGRESS NOTES
"    I N T E R N A L  M E D I C I N E  Jane Buenrostro, CHRIS    ENCOUNTER DATE:  07/17/2025    Brittany Sheppard / 44 y.o. / female      CHIEF COMPLAINT / REASON FOR OFFICE VISIT     Hypertension      ASSESSMENT & PLAN     Diagnoses and all orders for this visit:    1. Primary hypertension (Primary)  Overview:  Metoprolol succinate  mg once daily.      2. Swelling of lymph nodes  -     US Head Neck Soft Tissue; Future         SUMMARY/DISCUSSION  She plans to complete outstanding labs previously entered in February 2025.  BP not controlled at today's visit.  Discussed importance of good control of BP at goal of < 130/80.  She will monitor daily BP readings at home and send me readings for review via MyChart in 1-2 weeks.  Avoid caffeine, limit stress.  Resume use of CPAP.  Follow up with GYN and schedule mammogram.  Updated neck US for monitoring of history of swollen submandibular lymph nodes ordered.    30 minutes spent reviewing chart, meeting with patient, gathering history, assessment, documentation.    Next Appointment with me: 12/4/2025    Return for Next scheduled follow up.      VITAL SIGNS     Visit Vitals  /89   Pulse 99   Temp 97.7 °F (36.5 °C)   Resp 18   Ht 162.6 cm (64.02\")   Wt 125 kg (275 lb)   SpO2 100%   BMI 47.18 kg/m²             Wt Readings from Last 3 Encounters:   07/17/25 125 kg (275 lb)   05/09/25 122 kg (270 lb)   02/24/25 131 kg (288 lb)     Body mass index is 47.18 kg/m².        MEDICATIONS AT THE TIME OF OFFICE VISIT     Current Outpatient Medications on File Prior to Visit   Medication Sig Dispense Refill    atomoxetine (Strattera) 80 MG capsule Take 1 capsule by mouth Daily. 90 capsule 1    gabapentin (Neurontin) 300 MG capsule Take 1 capsule by mouth 3 (Three) Times a Day. 90 capsule 2    hydroxychloroquine (PLAQUENIL) 200 MG tablet Take 1 tablet by mouth Every 12 (Twelve) Hours. 180 tablet 1    Melatonin 10 MG tablet Take 1 tablet by mouth Every Night.      metoprolol succinate " XL (TOPROL-XL) 100 MG 24 hr tablet Take 1 tablet by mouth Daily. 90 tablet 0    sertraline (Zoloft) 100 MG tablet Take 1 tablet by mouth Daily. 90 tablet 1    valACYclovir (Valtrex) 500 MG tablet Take 1 tablet by mouth 2 (Two) Times a Day. 90 tablet 3    albuterol sulfate HFA (Ventolin HFA) 108 (90 Base) MCG/ACT inhaler Inhale 2 puffs Every 4 (Four) Hours As Needed for Wheezing or Shortness of Air. 18 g 3    hydrOXYzine pamoate (Vistaril) 50 MG capsule Take 1-2 capsules by mouth 3 (Three) Times a Day As Needed for Sleep/Anxiety (Patient not taking: Reported on 7/17/2025) 90 capsule 1    [DISCONTINUED] Lumateperone Tosylate 42 MG capsule Take 1 capsule by mouth Every Night. (Patient not taking: Reported on 7/17/2025) 21 capsule 0     No current facility-administered medications on file prior to visit.        HISTORY OF PRESENT ILLNESS     May 2024 Ultrasound showed an abnormal left-sided submandibular lymph node, for which needle biopsy was recommended.  She followed up with ENT and was told no biopsy was needed because lymph node swelling resolved on its own.  She reports in the last six months, has noticed new swelling of right sided submandibular lymph node - now feeling better.  At that time of swelling, tender to palpation.  She would like to proceed with updated US imaging.       HTN: BP is elevated at today's appointment - did drink caffeine prior to today's appointment.  Remains compliant with metoprolol succinate  mg daily.  BP at home is averaging 120s/70s.  January 2024 Home sleep study with severe KENDRA.  Recently moved to new apartment and plans to start wearing CPAP for KENDRA again.       Followed by psychiatry, Dr. Isha Vickers, for bipolar 1 disorder.  Next appointment is September 4, 2025.  Mood stable on sertraline 100 mg daily, Strattera 80 mg daily, hydroxyzine  mg TID PRN (rare use), gabapentin 300 mg TID.        Followed by rheumatology, Dr. Bella Bran for seropositive rheumatoid  arthritis.  On hydroxychloroquine 200 mg BID.  Next appointment is October 2025.       BMI 48: Weight is stable. Underwent EGD with Dr. Duke on October 29, 2024.  Next appointment on August 28, 2025 as sleeve consult - plan for surgery in September 2025.      Followed by OBGYN, Dr. Stefany Wilburn.  Next appointment is October 23, 2025.  She plans to schedule mammogram.  Plans to complete Pelvic US on July 24, 2025 to address incidental finding of 3.5 cm cystic structure near right ovary region on recent bladder US.       Patient Care Team:  Jane Buenrostro APRN as PCP - General (Family Medicine)  Lilia Joshua PA-C as Physician Assistant (Colon and Rectal Surgery)  Isha Vickers MD as Consulting Physician (Psychiatry)    REVIEW OF SYSTEMS     Review of Systems   Constitutional:  Negative for chills, fever and unexpected weight change.   Respiratory:  Negative for cough, chest tightness and shortness of breath.    Cardiovascular:  Negative for chest pain, palpitations and leg swelling.   Neurological:  Negative for dizziness, weakness, light-headedness and headaches.   Psychiatric/Behavioral:  The patient is not nervous/anxious.           PHYSICAL EXAMINATION     Physical Exam  Vitals reviewed.   Constitutional:       General: She is not in acute distress.     Appearance: Normal appearance. She is not ill-appearing, toxic-appearing or diaphoretic.   HENT:      Head: Normocephalic and atraumatic.   Neck:      Comments: No palpable submandibular lymph nodes on today's exam  Cardiovascular:      Rate and Rhythm: Normal rate and regular rhythm.      Heart sounds: Normal heart sounds.   Pulmonary:      Effort: Pulmonary effort is normal.      Breath sounds: Normal breath sounds.   Lymphadenopathy:      Cervical: No cervical adenopathy.   Neurological:      Mental Status: She is alert and oriented to person, place, and time. Mental status is at baseline.   Psychiatric:         Mood and Affect: Mood normal.          Behavior: Behavior normal.         Thought Content: Thought content normal.         Judgment: Judgment normal.           REVIEWED DATA     Labs:           Imaging:            Medical Tests:           Summary of old records / correspondence / consultant report:           Request outside records:

## 2025-07-24 ENCOUNTER — HOSPITAL ENCOUNTER (OUTPATIENT)
Facility: HOSPITAL | Age: 45
Discharge: HOME | End: 2025-07-24
Payer: COMMERCIAL

## 2025-07-24 DIAGNOSIS — I10 PRIMARY HYPERTENSION: Primary | ICD-10-CM

## 2025-07-24 DIAGNOSIS — R59.9 SWELLING OF LYMPH NODES: ICD-10-CM

## 2025-07-24 DIAGNOSIS — R93.89 ABNORMAL PELVIC ULTRASOUND: ICD-10-CM

## 2025-07-24 PROCEDURE — 76856 US EXAM PELVIC COMPLETE: CPT

## 2025-07-24 PROCEDURE — 76536 US EXAM OF HEAD AND NECK: CPT

## 2025-07-24 PROCEDURE — 76830 TRANSVAGINAL US NON-OB: CPT

## 2025-07-24 RX ORDER — AMLODIPINE BESYLATE 5 MG/1
5 TABLET ORAL DAILY
Qty: 30 TABLET | Refills: 0 | Status: SHIPPED | OUTPATIENT
Start: 2025-07-24

## 2025-07-30 ENCOUNTER — TELEPHONE (OUTPATIENT)
Dept: INTERNAL MEDICINE | Age: 45
End: 2025-07-30
Payer: COMMERCIAL

## 2025-07-30 NOTE — TELEPHONE ENCOUNTER
We got a returned mailed for this patient, called patient and confirmed the address, sent the mail back to the correct address, then mail returned back saying it is wrong address.

## 2025-07-31 DIAGNOSIS — R59.9 SWELLING OF LYMPH NODES: Primary | ICD-10-CM

## 2025-08-01 ENCOUNTER — PATIENT MESSAGE (OUTPATIENT)
Dept: INTERNAL MEDICINE | Age: 45
End: 2025-08-01
Payer: COMMERCIAL

## 2025-08-01 ENCOUNTER — TELEPHONE (OUTPATIENT)
Dept: INTERNAL MEDICINE | Age: 45
End: 2025-08-01
Payer: COMMERCIAL

## 2025-08-01 DIAGNOSIS — R93.89 ABNORMAL PELVIC ULTRASOUND: Primary | ICD-10-CM

## 2025-08-01 NOTE — TELEPHONE ENCOUNTER
Hub staff attempted to follow warm transfer process and was unsuccessful     Caller: Brittany Sheppard    Relationship to patient: Self    Best call back number: [phone]     Patient is needing: PATIENT IS RETURNING [facility]'S PHONE CALL. PLEASE CALL

## 2025-08-01 NOTE — TELEPHONE ENCOUNTER
Please call patient.  I wrote back via [employer]:    If blood pressure is consistently over 140/100, you may increase amlodipine from 5 mg to 10 mg daily.  Recommend scheduling an appointment for next week - may be telehealth visit.     I placed pelvic ultrasound - please make sure you discuss with GYN as well.

## 2025-08-01 NOTE — TELEPHONE ENCOUNTER
Called Patient back and left voice message to call back in the office I advised her that I leave at 04:15 but that should be someone in the office to answer the phone call.

## 2025-08-04 DIAGNOSIS — I10 PRIMARY HYPERTENSION: Primary | ICD-10-CM

## 2025-08-04 RX ORDER — AMLODIPINE BESYLATE 5 MG/1
10 TABLET ORAL DAILY
Qty: 30 TABLET | Refills: 0 | OUTPATIENT
Start: 2025-08-04

## 2025-08-06 ENCOUNTER — TELEPHONE (OUTPATIENT)
Dept: INTERNAL MEDICINE | Age: 45
End: 2025-08-06
Payer: COMMERCIAL

## 2025-08-07 ENCOUNTER — TELEPHONE (OUTPATIENT)
Dept: BARIATRICS/WEIGHT MGMT | Facility: CLINIC | Age: 45
End: 2025-08-07
Payer: COMMERCIAL

## 2025-08-08 ENCOUNTER — TELEMEDICINE (OUTPATIENT)
Dept: INTERNAL MEDICINE | Age: 45
End: 2025-08-08
Payer: COMMERCIAL

## 2025-08-08 VITALS
HEIGHT: 64 IN | WEIGHT: 277 LBS | BODY MASS INDEX: 47.29 KG/M2 | DIASTOLIC BLOOD PRESSURE: 90 MMHG | SYSTOLIC BLOOD PRESSURE: 140 MMHG

## 2025-08-08 DIAGNOSIS — I10 PRIMARY HYPERTENSION: Primary | ICD-10-CM

## 2025-08-08 PROCEDURE — 99214 OFFICE O/P EST MOD 30 MIN: CPT

## 2025-08-08 RX ORDER — AMLODIPINE BESYLATE 5 MG/1
5 TABLET ORAL DAILY
Qty: 90 TABLET | Refills: 0 | Status: SHIPPED | OUTPATIENT
Start: 2025-08-08

## 2025-08-08 RX ORDER — CLONIDINE HYDROCHLORIDE 0.1 MG/1
0.1 TABLET ORAL NIGHTLY
Qty: 90 TABLET | Refills: 0 | Status: SHIPPED | OUTPATIENT
Start: 2025-08-08

## 2025-08-08 RX ORDER — CLONIDINE HYDROCHLORIDE 0.1 MG/1
0.1 TABLET ORAL NIGHTLY
COMMUNITY
End: 2025-08-08 | Stop reason: SDUPTHER

## 2025-08-08 RX ORDER — LOSARTAN POTASSIUM 25 MG/1
25 TABLET ORAL DAILY
Qty: 30 TABLET | Refills: 0 | Status: SHIPPED | OUTPATIENT
Start: 2025-08-08

## 2025-08-09 ENCOUNTER — TRANSCRIBE ORDERS (OUTPATIENT)
Dept: ADMINISTRATIVE | Facility: HOSPITAL | Age: 45
End: 2025-08-09
Payer: COMMERCIAL

## 2025-08-09 DIAGNOSIS — Z12.31 VISIT FOR SCREENING MAMMOGRAM: Primary | ICD-10-CM

## 2025-08-17 DIAGNOSIS — F41.1 GENERALIZED ANXIETY DISORDER: Chronic | ICD-10-CM

## 2025-08-17 DIAGNOSIS — I10 PRIMARY HYPERTENSION: ICD-10-CM

## 2025-08-17 DIAGNOSIS — F31.32 BIPOLAR 1 DISORDER, DEPRESSED, MODERATE: Chronic | ICD-10-CM

## 2025-08-17 RX ORDER — GABAPENTIN 300 MG/1
300 CAPSULE ORAL 3 TIMES DAILY
Qty: 90 CAPSULE | Refills: 0 | Status: SHIPPED | OUTPATIENT
Start: 2025-08-17 | End: 2026-08-17

## 2025-08-18 RX ORDER — METOPROLOL SUCCINATE 100 MG/1
100 TABLET, EXTENDED RELEASE ORAL DAILY
Qty: 90 TABLET | Refills: 0 | Status: SHIPPED | OUTPATIENT
Start: 2025-08-18

## 2025-08-20 ENCOUNTER — TELEPHONE (OUTPATIENT)
Dept: INTERNAL MEDICINE | Age: 45
End: 2025-08-20
Payer: COMMERCIAL

## 2025-08-21 ENCOUNTER — TELEMEDICINE (OUTPATIENT)
Dept: INTERNAL MEDICINE | Age: 45
End: 2025-08-21
Payer: COMMERCIAL

## 2025-08-21 VITALS
HEIGHT: 64 IN | BODY MASS INDEX: 46.1 KG/M2 | WEIGHT: 270 LBS | DIASTOLIC BLOOD PRESSURE: 92 MMHG | SYSTOLIC BLOOD PRESSURE: 132 MMHG

## 2025-08-21 DIAGNOSIS — I1A.0 RESISTANT HYPERTENSION: Primary | ICD-10-CM

## 2025-08-21 PROCEDURE — 99213 OFFICE O/P EST LOW 20 MIN: CPT | Performed by: PHYSICIAN ASSISTANT

## 2025-08-21 RX ORDER — HYDROCHLOROTHIAZIDE 12.5 MG/1
12.5 TABLET ORAL DAILY
Qty: 30 TABLET | Refills: 0 | Status: SHIPPED | OUTPATIENT
Start: 2025-08-21

## 2025-08-21 RX ORDER — LOSARTAN POTASSIUM 50 MG/1
50 TABLET ORAL DAILY
Qty: 30 TABLET | Refills: 0 | Status: SHIPPED | OUTPATIENT
Start: 2025-08-21

## 2025-08-28 ENCOUNTER — HOSPITAL ENCOUNTER (OUTPATIENT)
Facility: HOSPITAL | Age: 45
Discharge: HOME | End: 2025-08-28
Payer: COMMERCIAL

## 2025-08-28 DIAGNOSIS — R59.9 SWELLING OF LYMPH NODES: ICD-10-CM

## 2025-08-28 PROCEDURE — 70491 CT SOFT TISSUE NECK W/DYE: CPT

## 2025-08-28 PROCEDURE — 25510000001 IOPAMIDOL 61 % SOLUTION

## 2025-08-28 RX ORDER — IOPAMIDOL 612 MG/ML
100 INJECTION, SOLUTION INTRAVASCULAR
Status: COMPLETED | OUTPATIENT
Start: 2025-08-28 | End: 2025-08-28

## 2025-08-28 RX ADMIN — IOPAMIDOL 85 ML: 612 INJECTION, SOLUTION INTRAVENOUS at 15:41

## (undated) DEVICE — BLCK/BITE BLOX W/DENTL/RIM W/STRAP 54F

## (undated) DEVICE — BLCK/BITE BLOX WO/DENTL/RIM W/STRAP 54F

## (undated) DEVICE — SENSR O2 OXIMAX FNGR A/ 18IN NONSTR

## (undated) DEVICE — MSK PROC CURAPLEX O2 2/ADAPT 7FT

## (undated) DEVICE — KT ORCA ORCAPOD DISP STRL

## (undated) DEVICE — LN SMPL CO2 SHTRM SD STREAM W/M LUER

## (undated) DEVICE — ADAPT CLN BIOGUARD AIR/H2O DISP

## (undated) DEVICE — TUBING, SUCTION, 1/4" X 10', STRAIGHT: Brand: MEDLINE

## (undated) DEVICE — FRCP BX RADJAW4 NDL 2.8 240CM LG OG BX40